# Patient Record
Sex: FEMALE | Race: BLACK OR AFRICAN AMERICAN | NOT HISPANIC OR LATINO | Employment: UNEMPLOYED | URBAN - METROPOLITAN AREA
[De-identification: names, ages, dates, MRNs, and addresses within clinical notes are randomized per-mention and may not be internally consistent; named-entity substitution may affect disease eponyms.]

---

## 2018-05-30 ENCOUNTER — HOSPITAL ENCOUNTER (INPATIENT)
Facility: HOSPITAL | Age: 43
LOS: 2 days | Discharge: HOME | DRG: 975 | End: 2018-06-01
Attending: EMERGENCY MEDICINE | Admitting: EMERGENCY MEDICINE
Payer: MEDICAID

## 2018-05-30 DIAGNOSIS — E44.0 MODERATE PROTEIN-CALORIE MALNUTRITION: Chronic | ICD-10-CM

## 2018-05-30 DIAGNOSIS — J18.9 PNEUMONIA OF RIGHT MIDDLE LOBE DUE TO INFECTIOUS ORGANISM: ICD-10-CM

## 2018-05-30 DIAGNOSIS — R41.82 ALTERED MENTAL STATUS: ICD-10-CM

## 2018-05-30 DIAGNOSIS — E87.1 HYPONATREMIA: ICD-10-CM

## 2018-05-30 DIAGNOSIS — J18.9 PNEUMONIA OF BOTH LOWER LOBES DUE TO INFECTIOUS ORGANISM: ICD-10-CM

## 2018-05-30 DIAGNOSIS — B20 AIDS: Primary | ICD-10-CM

## 2018-05-30 DIAGNOSIS — Z86.11 HISTORY OF TUBERCULOSIS: ICD-10-CM

## 2018-05-30 DIAGNOSIS — D72.819 CHRONIC LEUKOPENIA: ICD-10-CM

## 2018-05-30 DIAGNOSIS — Z72.0 TOBACCO ABUSE: Chronic | ICD-10-CM

## 2018-05-30 DIAGNOSIS — E87.6 HYPOKALEMIA: ICD-10-CM

## 2018-05-30 DIAGNOSIS — D63.8 ANEMIA OF CHRONIC DISORDER: Chronic | ICD-10-CM

## 2018-05-30 DIAGNOSIS — R04.2 HEMOPTYSIS: ICD-10-CM

## 2018-05-30 DIAGNOSIS — A31.0 MYCOBACTERIUM KANSASII INFECTION: ICD-10-CM

## 2018-05-30 LAB
ALBUMIN SERPL BCP-MCNC: 3 G/DL
ALP SERPL-CCNC: 70 U/L
ALT SERPL W/O P-5'-P-CCNC: 37 U/L
ANION GAP SERPL CALC-SCNC: 9 MMOL/L
APAP SERPL-MCNC: <3 UG/ML
AST SERPL-CCNC: 57 U/L
BASOPHILS # BLD AUTO: 0.01 K/UL
BASOPHILS NFR BLD: 0.3 %
BILIRUB SERPL-MCNC: 0.3 MG/DL
BNP SERPL-MCNC: <10 PG/ML
BUN SERPL-MCNC: 13 MG/DL
CALCIUM SERPL-MCNC: 8.9 MG/DL
CHLORIDE SERPL-SCNC: 99 MMOL/L
CK SERPL-CCNC: 102 U/L
CO2 SERPL-SCNC: 23 MMOL/L
CREAT SERPL-MCNC: 0.7 MG/DL
DIFFERENTIAL METHOD: ABNORMAL
EOSINOPHIL # BLD AUTO: 0 K/UL
EOSINOPHIL NFR BLD: 0.3 %
ERYTHROCYTE [DISTWIDTH] IN BLOOD BY AUTOMATED COUNT: 13.3 %
EST. GFR  (AFRICAN AMERICAN): >60 ML/MIN/1.73 M^2
EST. GFR  (NON AFRICAN AMERICAN): >60 ML/MIN/1.73 M^2
ETHANOL SERPL-MCNC: <10 MG/DL
GLUCOSE SERPL-MCNC: 113 MG/DL
HCT VFR BLD AUTO: 28.4 %
HGB BLD-MCNC: 9.6 G/DL
LIPASE SERPL-CCNC: 54 U/L
LYMPHOCYTES # BLD AUTO: 0.8 K/UL
LYMPHOCYTES NFR BLD: 21.6 %
MCH RBC QN AUTO: 31.4 PG
MCHC RBC AUTO-ENTMCNC: 33.8 G/DL
MCV RBC AUTO: 93 FL
MONOCYTES # BLD AUTO: 0.8 K/UL
MONOCYTES NFR BLD: 23.9 %
NEUTROPHILS # BLD AUTO: 1.9 K/UL
NEUTROPHILS NFR BLD: 53.9 %
PLATELET # BLD AUTO: 214 K/UL
PMV BLD AUTO: 10.1 FL
POTASSIUM SERPL-SCNC: 3.3 MMOL/L
PROT SERPL-MCNC: 10.7 G/DL
RBC # BLD AUTO: 3.06 M/UL
SALICYLATES SERPL-MCNC: <5 MG/DL
SODIUM SERPL-SCNC: 131 MMOL/L
TROPONIN I SERPL DL<=0.01 NG/ML-MCNC: <0.006 NG/ML
WBC # BLD AUTO: 3.47 K/UL

## 2018-05-30 PROCEDURE — 84443 ASSAY THYROID STIM HORMONE: CPT

## 2018-05-30 PROCEDURE — 83880 ASSAY OF NATRIURETIC PEPTIDE: CPT

## 2018-05-30 PROCEDURE — 93005 ELECTROCARDIOGRAM TRACING: CPT

## 2018-05-30 PROCEDURE — 12000002 HC ACUTE/MED SURGE SEMI-PRIVATE ROOM

## 2018-05-30 PROCEDURE — 85025 COMPLETE CBC W/AUTO DIFF WBC: CPT

## 2018-05-30 PROCEDURE — 93010 ELECTROCARDIOGRAM REPORT: CPT | Mod: ,,, | Performed by: INTERNAL MEDICINE

## 2018-05-30 PROCEDURE — 84484 ASSAY OF TROPONIN QUANT: CPT

## 2018-05-30 PROCEDURE — 83690 ASSAY OF LIPASE: CPT

## 2018-05-30 PROCEDURE — 80307 DRUG TEST PRSMV CHEM ANLYZR: CPT

## 2018-05-30 PROCEDURE — 80053 COMPREHEN METABOLIC PANEL: CPT

## 2018-05-30 PROCEDURE — 82550 ASSAY OF CK (CPK): CPT

## 2018-05-30 PROCEDURE — 80329 ANALGESICS NON-OPIOID 1 OR 2: CPT

## 2018-05-30 PROCEDURE — 96365 THER/PROPH/DIAG IV INF INIT: CPT

## 2018-05-30 PROCEDURE — 81025 URINE PREGNANCY TEST: CPT | Performed by: EMERGENCY MEDICINE

## 2018-05-30 PROCEDURE — 99285 EMERGENCY DEPT VISIT HI MDM: CPT | Mod: 25

## 2018-05-30 PROCEDURE — 80320 DRUG SCREEN QUANTALCOHOLS: CPT

## 2018-05-30 PROCEDURE — 96366 THER/PROPH/DIAG IV INF ADDON: CPT

## 2018-05-30 PROCEDURE — 96375 TX/PRO/DX INJ NEW DRUG ADDON: CPT

## 2018-05-30 PROCEDURE — 96360 HYDRATION IV INFUSION INIT: CPT

## 2018-05-31 PROBLEM — F14.10 COCAINE ABUSE: Status: ACTIVE | Noted: 2018-05-31

## 2018-05-31 PROBLEM — D72.819 CHRONIC LEUKOPENIA: Status: ACTIVE | Noted: 2018-05-31

## 2018-05-31 LAB
AMPHET+METHAMPHET UR QL: NEGATIVE
B-HCG UR QL: NEGATIVE
BARBITURATES UR QL SCN>200 NG/ML: NEGATIVE
BENZODIAZ UR QL SCN>200 NG/ML: NORMAL
BILIRUB UR QL STRIP: NEGATIVE
BZE UR QL SCN: NORMAL
CANNABINOIDS UR QL SCN: NEGATIVE
CD3+CD4+ CELLS # BLD: 97 CELLS/UL (ref 300–1400)
CD3+CD4+ CELLS NFR BLD: 13.7 % (ref 28–57)
CLARITY UR: CLEAR
COLOR UR: YELLOW
CREAT UR-MCNC: 96.6 MG/DL
CTP QC/QA: YES
GLUCOSE UR QL STRIP: NEGATIVE
HGB UR QL STRIP: ABNORMAL
KETONES UR QL STRIP: NEGATIVE
LEUKOCYTE ESTERASE UR QL STRIP: NEGATIVE
METHADONE UR QL SCN>300 NG/ML: NEGATIVE
MICROSCOPIC COMMENT: ABNORMAL
NITRITE UR QL STRIP: NEGATIVE
OPIATES UR QL SCN: NEGATIVE
PCP UR QL SCN>25 NG/ML: NEGATIVE
PH UR STRIP: 6 [PH] (ref 5–8)
PROT UR QL STRIP: NEGATIVE
RBC #/AREA URNS HPF: 10 /HPF (ref 0–4)
SP GR UR STRIP: 1.02 (ref 1–1.03)
TOXICOLOGY INFORMATION: NORMAL
TSH SERPL DL<=0.005 MIU/L-ACNC: 0.61 UIU/ML
URN SPEC COLLECT METH UR: ABNORMAL
UROBILINOGEN UR STRIP-ACNC: NEGATIVE EU/DL
WBC #/AREA URNS HPF: 1 /HPF (ref 0–5)

## 2018-05-31 PROCEDURE — 25000003 PHARM REV CODE 250: Performed by: HOSPITALIST

## 2018-05-31 PROCEDURE — 25000003 PHARM REV CODE 250: Performed by: EMERGENCY MEDICINE

## 2018-05-31 PROCEDURE — 81000 URINALYSIS NONAUTO W/SCOPE: CPT | Mod: 59

## 2018-05-31 PROCEDURE — 63600175 PHARM REV CODE 636 W HCPCS: Performed by: EMERGENCY MEDICINE

## 2018-05-31 PROCEDURE — 21400001 HC TELEMETRY ROOM

## 2018-05-31 PROCEDURE — A4216 STERILE WATER/SALINE, 10 ML: HCPCS | Performed by: EMERGENCY MEDICINE

## 2018-05-31 PROCEDURE — 87536 HIV-1 QUANT&REVRSE TRNSCRPJ: CPT

## 2018-05-31 PROCEDURE — 80307 DRUG TEST PRSMV CHEM ANLYZR: CPT

## 2018-05-31 PROCEDURE — 99203 OFFICE O/P NEW LOW 30 MIN: CPT | Mod: ,,, | Performed by: INTERNAL MEDICINE

## 2018-05-31 PROCEDURE — 86361 T CELL ABSOLUTE COUNT: CPT

## 2018-05-31 PROCEDURE — 87040 BLOOD CULTURE FOR BACTERIA: CPT

## 2018-05-31 RX ORDER — LORAZEPAM 2 MG/ML
1 INJECTION INTRAMUSCULAR
Status: COMPLETED | OUTPATIENT
Start: 2018-05-31 | End: 2018-05-31

## 2018-05-31 RX ORDER — FAMOTIDINE 10 MG/ML
20 INJECTION INTRAVENOUS EVERY 12 HOURS
Status: DISCONTINUED | OUTPATIENT
Start: 2018-05-31 | End: 2018-05-31

## 2018-05-31 RX ORDER — SODIUM CHLORIDE 0.9 % (FLUSH) 0.9 %
3 SYRINGE (ML) INJECTION EVERY 8 HOURS
Status: DISCONTINUED | OUTPATIENT
Start: 2018-05-31 | End: 2018-06-01 | Stop reason: HOSPADM

## 2018-05-31 RX ORDER — CEFTRIAXONE 2 G/50ML
2 INJECTION, SOLUTION INTRAVENOUS
Status: DISCONTINUED | OUTPATIENT
Start: 2018-05-31 | End: 2018-06-01 | Stop reason: HOSPADM

## 2018-05-31 RX ORDER — IBUPROFEN 200 MG
1 TABLET ORAL DAILY
Status: DISCONTINUED | OUTPATIENT
Start: 2018-06-01 | End: 2018-06-01 | Stop reason: HOSPADM

## 2018-05-31 RX ORDER — RAMELTEON 8 MG/1
8 TABLET ORAL NIGHTLY PRN
Status: DISCONTINUED | OUTPATIENT
Start: 2018-05-31 | End: 2018-06-01 | Stop reason: HOSPADM

## 2018-05-31 RX ORDER — AMOXICILLIN 250 MG
1 CAPSULE ORAL 2 TIMES DAILY
Status: DISCONTINUED | OUTPATIENT
Start: 2018-05-31 | End: 2018-06-01 | Stop reason: HOSPADM

## 2018-05-31 RX ORDER — ACETAMINOPHEN 325 MG/1
650 TABLET ORAL EVERY 8 HOURS PRN
Status: DISCONTINUED | OUTPATIENT
Start: 2018-05-31 | End: 2018-06-01 | Stop reason: HOSPADM

## 2018-05-31 RX ORDER — SULFAMETHOXAZOLE AND TRIMETHOPRIM 800; 160 MG/1; MG/1
1 TABLET ORAL DAILY
Status: DISCONTINUED | OUTPATIENT
Start: 2018-06-01 | End: 2018-06-01 | Stop reason: HOSPADM

## 2018-05-31 RX ORDER — CITALOPRAM 20 MG/1
20 TABLET, FILM COATED ORAL DAILY
Status: DISCONTINUED | OUTPATIENT
Start: 2018-06-01 | End: 2018-06-01 | Stop reason: HOSPADM

## 2018-05-31 RX ORDER — ONDANSETRON 2 MG/ML
4 INJECTION INTRAMUSCULAR; INTRAVENOUS EVERY 8 HOURS PRN
Status: DISCONTINUED | OUTPATIENT
Start: 2018-05-31 | End: 2018-06-01 | Stop reason: HOSPADM

## 2018-05-31 RX ORDER — ZOLPIDEM TARTRATE 5 MG/1
5 TABLET ORAL NIGHTLY PRN
Status: DISCONTINUED | OUTPATIENT
Start: 2018-05-31 | End: 2018-05-31

## 2018-05-31 RX ORDER — CYPROHEPTADINE HYDROCHLORIDE 4 MG/1
4 TABLET ORAL 2 TIMES DAILY
Status: DISCONTINUED | OUTPATIENT
Start: 2018-05-31 | End: 2018-06-01 | Stop reason: HOSPADM

## 2018-05-31 RX ORDER — ALPRAZOLAM 0.5 MG/1
1 TABLET ORAL 2 TIMES DAILY PRN
Status: DISCONTINUED | OUTPATIENT
Start: 2018-05-31 | End: 2018-06-01 | Stop reason: HOSPADM

## 2018-05-31 RX ORDER — SODIUM CHLORIDE 9 MG/ML
INJECTION, SOLUTION INTRAVENOUS ONCE
Status: COMPLETED | OUTPATIENT
Start: 2018-05-31 | End: 2018-05-31

## 2018-05-31 RX ADMIN — SODIUM CHLORIDE 500 ML/HR: 0.9 INJECTION, SOLUTION INTRAVENOUS at 06:05

## 2018-05-31 RX ADMIN — ALPRAZOLAM 1 MG: 0.5 TABLET ORAL at 06:05

## 2018-05-31 RX ADMIN — DOCUSATE SODIUM AND SENNOSIDES 1 TABLET: 8.6; 5 TABLET, FILM COATED ORAL at 09:05

## 2018-05-31 RX ADMIN — AZITHROMYCIN MONOHYDRATE 500 MG: 500 INJECTION, POWDER, LYOPHILIZED, FOR SOLUTION INTRAVENOUS at 03:05

## 2018-05-31 RX ADMIN — DEXTROSE AND SODIUM CHLORIDE 1000 ML: 5; .9 INJECTION, SOLUTION INTRAVENOUS at 12:05

## 2018-05-31 RX ADMIN — LORAZEPAM 1 MG: 2 INJECTION INTRAMUSCULAR; INTRAVENOUS at 03:05

## 2018-05-31 RX ADMIN — CEFTRIAXONE 2 G: 2 INJECTION, SOLUTION INTRAVENOUS at 07:05

## 2018-05-31 RX ADMIN — CYPROHEPTADINE HYDROCHLORIDE 4 MG: 4 TABLET ORAL at 09:05

## 2018-05-31 RX ADMIN — SODIUM CHLORIDE, PRESERVATIVE FREE 3 ML: 5 INJECTION INTRAVENOUS at 09:05

## 2018-05-31 RX ADMIN — ACETAMINOPHEN 650 MG: 325 TABLET, FILM COATED ORAL at 06:05

## 2018-05-31 NOTE — HPI
"Ms. Jayden Marcial is a 42 y.o. woman with HIV/AIDS not adherent to ART, KANDY and M [place] pulmonary infection not on treatment, and pancreatic mass who presents with fatigue. Patient states that she has felt poorly for "a while" and "very bad" for a few days. She is unable to describe this further. She says that after using cocaine yesterday she felt delusional and restless. She describes hemoptysis intermittent, abdominal swelling, and "itching when [she] wipe[s] for "a long time." She denies headaches, vision changes, decreased PO, dysphagia, odynophagia, nausea, vomiting, chest pain, shortness of breath, constipation, diarrhea, blood in stools, urinary changes, hematuria, vaginal discharge, rashes, and joint pains. She states that she does not take her medications because "they make [her] feel weird."     Last admitted in [facility] system in 12/2017 for Strep bacteremia. KANDY and WHIT curranii were diagnosed in 8/2017, was discharged with treatment, but patient was not adherent. Notes from Care Everywhere indicate that patient has been recently seen there for pancreatic mass with concern for malignancy; EGD/EUS performed on 5/17- only pathology for review is gastric antrum biopsy which was negative.   "

## 2018-05-31 NOTE — SUBJECTIVE & OBJECTIVE
"Past Medical History:   Diagnosis Date    AIDS     Blind right eye     Cocaine use     HIV dementia     Malnutrition     Non compliance with medical treatment     Pancreatitis     TB (pulmonary tuberculosis)     Tobacco abuse        Past Surgical History:   Procedure Laterality Date    ABSCESS DRAINAGE      APPENDECTOMY      CORNEAL TRANSPLANT         Review of patient's allergies indicates:  No Known Allergies    Family History     Problem Relation (Age of Onset)    COPD Mother    Cancer Maternal Grandmother    Emphysema Mother    Hypertension Mother        Social History Main Topics    Smoking status: Current Every Day Smoker     Packs/day: 0.50     Years: 20.00    Smokeless tobacco: Not on file    Alcohol use No      Comment: occasional, "not drinking like she used to" before the pancreatitis     Drug use: Yes     Frequency: 1.0 time per week     Types: Cocaine, Benzodiazepines, Marijuana      Comment: last used cocaine a few days ago    Sexual activity: Not on file         Review of Systems   Constitutional: Positive for activity change, appetite change, fatigue and unexpected weight change.   HENT: Negative for congestion and postnasal drip.    Respiratory: Positive for cough and shortness of breath.    Cardiovascular: Negative for chest pain and leg swelling.   Gastrointestinal: Positive for abdominal pain and nausea. Negative for abdominal distention.   Endocrine: Negative for cold intolerance and heat intolerance.   Musculoskeletal: Negative for arthralgias and back pain.   Allergic/Immunologic: Negative for environmental allergies and food allergies.   Neurological: Negative for dizziness.   Hematological: Negative for adenopathy.   Psychiatric/Behavioral: Negative for agitation and behavioral problems.     Objective:     Vital Signs (Most Recent):  Temp: 99.1 °F (37.3 °C) (05/31/18 1100)  Pulse: 103 (05/31/18 1100)  Resp: 16 (05/31/18 1100)  BP: 97/62 (05/31/18 1100)  SpO2: 99 % (05/31/18 " 1100) Vital Signs (24h Range):  Temp:  [98.3 °F (36.8 °C)-99.1 °F (37.3 °C)] 99.1 °F (37.3 °C)  Pulse:  [] 103  Resp:  [16-18] 16  SpO2:  [95 %-100 %] 99 %  BP: ()/(53-82) 97/62     Weight: 40.2 kg (88 lb 10 oz)  Body mass index is 17.31 kg/m².      Intake/Output Summary (Last 24 hours) at 05/31/18 1311  Last data filed at 05/31/18 0900   Gross per 24 hour   Intake             2760 ml   Output              650 ml   Net             2110 ml       Physical Exam   Constitutional: She is oriented to person, place, and time.   Cachectic, malnourished    HENT:   Head: Normocephalic and atraumatic.   Eyes: EOM are normal. Right eye exhibits no discharge. Left eye exhibits no discharge.   Neck: Normal range of motion. Neck supple.   Cardiovascular: Normal rate and regular rhythm.    Pulmonary/Chest: Effort normal. She has no wheezes. She has no rales.   Abdominal: Soft. Bowel sounds are normal.   Musculoskeletal: Normal range of motion. She exhibits no edema.   Neurological: She is alert and oriented to person, place, and time.   Skin: Skin is warm and dry.   Psychiatric: She has a normal mood and affect. Her behavior is normal.   Nursing note and vitals reviewed.      Vents:       Lines/Drains/Airways     Peripheral Intravenous Line                 Peripheral IV - Single Lumen 05/30/18 2302 Right Forearm less than 1 day                Significant Labs:    CBC/Anemia Profile:    Recent Labs  Lab 05/30/18  2303   WBC 3.47*   HGB 9.6*   HCT 28.4*      MCV 93   RDW 13.3        Chemistries:    Recent Labs  Lab 05/30/18  2303   *   K 3.3*   CL 99   CO2 23   BUN 13   CREATININE 0.7   CALCIUM 8.9   ALBUMIN 3.0*   PROT 10.7*   BILITOT 0.3   ALKPHOS 70   ALT 37   AST 57*       All pertinent labs within the past 24 hours have been reviewed.    Significant Imaging:   I have reviewed and interpreted all pertinent imaging results/findings within the past 24 hours.

## 2018-05-31 NOTE — PLAN OF CARE
Problem: Fall Risk (Adult)  Goal: Absence of Falls  Patient will demonstrate the desired outcomes by discharge/transition of care.   Outcome: Ongoing (interventions implemented as appropriate)   05/31/18 1641   Fall Risk (Adult)   Absence of Falls making progress toward outcome       Problem: Patient Care Overview  Goal: Plan of Care Review  Outcome: Ongoing (interventions implemented as appropriate)   05/31/18 1641   Coping/Psychosocial   Plan Of Care Reviewed With patient       Problem: Infection, Risk/Actual (Adult)  Goal: Infection Prevention/Resolution  Patient will demonstrate the desired outcomes by discharge/transition of care.   Outcome: Ongoing (interventions implemented as appropriate)   05/31/18 1641   Infection, Risk/Actual (Adult)   Infection Prevention/Resolution making progress toward outcome       Problem: Anxiety (Adult)  Goal: Identify Related Risk Factors and Signs and Symptoms  Related risk factors and signs and symptoms are identified upon initiation of Human Response Clinical Practice Guideline (CPG)   Outcome: Ongoing (interventions implemented as appropriate)   05/31/18 1641   Anxiety   Signs and Symptoms (Anxiety) nervousness/tension/restlessness;apprehension/being worried

## 2018-05-31 NOTE — HPI
42 year old female with AIDS(follows at [facility] per patient), cocaine abuse, KANDY and mycobacterium [place](unclear if she completed therapy), and severe protein malnutrition. Patient is a poor historian. She has decline in her status for months to years. Pulmonary consulted for respiratory issues. Patient reports clear to yellow sputum production. She is unable to eplain onset of her respiratory symptoms. She is currently on room air with O2 sat > 99%.

## 2018-05-31 NOTE — CONSULTS
"[facility]  Pulmonology  Consult Note    Patient Name: Jayden Marcial  MRN: [id number]  Admission Date: 5/30/2018  Hospital Length of Stay: 0 days  Code Status: Full Code  Attending Physician: Irina Lima MD  Primary Care Provider: Teddy Kohler MD   Principal Problem: <principal problem not specified>    Inpatient consult to Pulmonology  Consult performed by: MAK SHAFER  Consult ordered by: EMMA JACKSON        Subjective:     HPI:  42 year old female with AIDS(follows at [facility] per patient), cocaine abuse, KANDY and mycobacterium [place](unclear if she completed therapy), and severe protein malnutrition. Patient is a poor historian. She has decline in her status for months to years. Pulmonary consulted for respiratory issues. Patient reports clear to yellow sputum production. She is unable to eplain onset of her respiratory symptoms. She is currently on room air with O2 sat > 99%.     Past Medical History:   Diagnosis Date    AIDS     Blind right eye     Cocaine use     HIV dementia     Malnutrition     Non compliance with medical treatment     Pancreatitis     TB (pulmonary tuberculosis)     Tobacco abuse        Past Surgical History:   Procedure Laterality Date    ABSCESS DRAINAGE      APPENDECTOMY      CORNEAL TRANSPLANT         Review of patient's allergies indicates:  No Known Allergies    Family History     Problem Relation (Age of Onset)    COPD Mother    Cancer Maternal Grandmother    Emphysema Mother    Hypertension Mother        Social History Main Topics    Smoking status: Current Every Day Smoker     Packs/day: 0.50     Years: 20.00    Smokeless tobacco: Not on file    Alcohol use No      Comment: occasional, "not drinking like she used to" before the pancreatitis     Drug use: Yes     Frequency: 1.0 time per week     Types: Cocaine, Benzodiazepines, Marijuana      Comment: last used cocaine a few days ago    Sexual activity: Not on file     "     Review of Systems   Constitutional: Positive for activity change, appetite change, fatigue and unexpected weight change.   HENT: Negative for congestion and postnasal drip.    Respiratory: Positive for cough and shortness of breath.    Cardiovascular: Negative for chest pain and leg swelling.   Gastrointestinal: Positive for abdominal pain and nausea. Negative for abdominal distention.   Endocrine: Negative for cold intolerance and heat intolerance.   Musculoskeletal: Negative for arthralgias and back pain.   Allergic/Immunologic: Negative for environmental allergies and food allergies.   Neurological: Negative for dizziness.   Hematological: Negative for adenopathy.   Psychiatric/Behavioral: Negative for agitation and behavioral problems.     Objective:     Vital Signs (Most Recent):  Temp: 99.1 °F (37.3 °C) (05/31/18 1100)  Pulse: 103 (05/31/18 1100)  Resp: 16 (05/31/18 1100)  BP: 97/62 (05/31/18 1100)  SpO2: 99 % (05/31/18 1100) Vital Signs (24h Range):  Temp:  [98.3 °F (36.8 °C)-99.1 °F (37.3 °C)] 99.1 °F (37.3 °C)  Pulse:  [] 103  Resp:  [16-18] 16  SpO2:  [95 %-100 %] 99 %  BP: ()/(53-82) 97/62     Weight: 40.2 kg (88 lb 10 oz)  Body mass index is 17.31 kg/m².      Intake/Output Summary (Last 24 hours) at 05/31/18 1311  Last data filed at 05/31/18 0900   Gross per 24 hour   Intake             2760 ml   Output              650 ml   Net             2110 ml       Physical Exam   Constitutional: She is oriented to person, place, and time.   Cachectic, malnourished    HENT:   Head: Normocephalic and atraumatic.   Eyes: EOM are normal. Right eye exhibits no discharge. Left eye exhibits no discharge.   Neck: Normal range of motion. Neck supple.   Cardiovascular: Normal rate and regular rhythm.    Pulmonary/Chest: Effort normal. She has no wheezes. She has no rales.   Abdominal: Soft. Bowel sounds are normal.   Musculoskeletal: Normal range of motion. She exhibits no edema.   Neurological: She is alert  and oriented to person, place, and time.   Skin: Skin is warm and dry.   Psychiatric: She has a normal mood and affect. Her behavior is normal.   Nursing note and vitals reviewed.      Vents:       Lines/Drains/Airways     Peripheral Intravenous Line                 Peripheral IV - Single Lumen 05/30/18 2302 Right Forearm less than 1 day                Significant Labs:    CBC/Anemia Profile:    Recent Labs  Lab 05/30/18  2303   WBC 3.47*   HGB 9.6*   HCT 28.4*      MCV 93   RDW 13.3        Chemistries:    Recent Labs  Lab 05/30/18  2303   *   K 3.3*   CL 99   CO2 23   BUN 13   CREATININE 0.7   CALCIUM 8.9   ALBUMIN 3.0*   PROT 10.7*   BILITOT 0.3   ALKPHOS 70   ALT 37   AST 57*       All pertinent labs within the past 24 hours have been reviewed.    Significant Imaging:   I have reviewed and interpreted all pertinent imaging results/findings within the past 24 hours.    Assessment/Plan:     Pneumonia of right middle lobe due to infectious organism    Patient with bibasilar lower lobe opacities on CXR. I suspect given her overall status(on room air with good O2 sat) and history that these findings are likely chronic. Her CXR is actually improved from her presentation 2 years ago(2016). There are less airspace opacities. Known history of KANDY and M. [place]. Unsure if she received treatment.   -Not unreasonable to treat for CAP.   -Follow up cultures  -Not unreasonable to get CT chest without contrast. Not sure what to do with findings given chronicity of symptoms.     Note: Reviewed care everywhere. There is concern that patient had pancreatic mass/neoplasm.               Thank you for your consult. I will follow-up with patient. Please contact us if you have any additional questions.     Mercedes Blake MD  Pulmonology  [facility] [place]

## 2018-05-31 NOTE — PROGRESS NOTES
PT arrived on unit via stretcher , accompanied per nurse. Pt is lethargic but arouses with tactile stimulus. Pt is unable to give an accurate history due to lethargy. Pt with ivab Zithromax infusing to rt  Forearm.site is clear.pt voided 400ml of clear yellow urine with out sediment.pt vital signs takne and she was placed on airborne contact isolation due to history and hemoptysis. Pt was oriented to environment, informed of md orders and blue admission booklet is at bedside.0520 am spoke with Dr. Moser in reference to pt b/p 81/53 and she is asymptomatic order noted for 500 ml saline bolus and to be repeated x one if b/p remains low.

## 2018-05-31 NOTE — SUBJECTIVE & OBJECTIVE
"Past Medical History:   Diagnosis Date    AIDS     Blind right eye     Cocaine use     HIV dementia     Malnutrition     Non compliance with medical treatment     Pancreatitis     TB (pulmonary tuberculosis)     Tobacco abuse        Past Surgical History:   Procedure Laterality Date    ABSCESS DRAINAGE      APPENDECTOMY      CORNEAL TRANSPLANT         Review of patient's allergies indicates:  No Known Allergies    No current facility-administered medications on file prior to encounter.      Current Outpatient Prescriptions on File Prior to Encounter   Medication Sig    acyclovir (ZOVIRAX) 200 MG capsule Take 2 capsules (400 mg total) by mouth 3 (three) times daily.    hydrocortisone 1 % cream Apply topically 2 (two) times daily.    ibuprofen (ADVIL,MOTRIN) 600 MG tablet Take 1 tablet (600 mg total) by mouth every 6 (six) hours as needed for Pain.    megestrol (MEGACE) 40 MG Tab Take 1 tablet (40 mg total) by mouth once daily.    miconazole (MICOTIN) 2 % vaginal cream Place 1 applicator vaginally every evening.    nicotine (NICODERM CQ) 21 mg/24 hr Place 1 patch onto the skin once daily.    oxycodone-acetaminophen (PERCOCET) 2.5-325 mg per tablet Take 1 tablet by mouth every 8 (eight) hours as needed for Pain.     Family History     Problem Relation (Age of Onset)    COPD Mother    Cancer Maternal Grandmother    Emphysema Mother    Hypertension Mother        Social History Main Topics    Smoking status: Current Every Day Smoker     Packs/day: 0.50     Years: 20.00    Smokeless tobacco: Not on file    Alcohol use No      Comment: occasional, "not drinking like she used to" before the pancreatitis     Drug use: Yes     Frequency: 1.0 time per week     Types: Cocaine, Benzodiazepines, Marijuana      Comment: last used cocaine a few days ago    Sexual activity: Not on file     Review of Systems   Constitutional: Positive for activity change and fatigue. Negative for appetite change, chills, " diaphoresis and fever.   HENT: Negative for congestion, ear pain, mouth sores, sinus pain, sinus pressure, sore throat and trouble swallowing.    Eyes: Negative for visual disturbance.   Respiratory: Negative for cough, chest tightness and shortness of breath.    Cardiovascular: Negative for chest pain, palpitations and leg swelling.   Gastrointestinal: Positive for abdominal distention. Negative for abdominal pain, blood in stool, constipation, diarrhea, nausea and vomiting.   Genitourinary: Negative for difficulty urinating, dysuria, flank pain, frequency, hematuria, urgency, vaginal bleeding and vaginal discharge.   Musculoskeletal: Negative for arthralgias, gait problem, myalgias, neck pain and neck stiffness.   Skin: Positive for rash (chronic ). Negative for pallor and wound.   Neurological: Negative for dizziness, weakness, light-headedness, numbness and headaches.     Objective:     Vital Signs (Most Recent):  Temp: 98.4 °F (36.9 °C) (05/31/18 1500)  Pulse: 98 (05/31/18 1500)  Resp: 16 (05/31/18 1500)  BP: 102/65 (05/31/18 1500)  SpO2: 99 % (05/31/18 1500) Vital Signs (24h Range):  Temp:  [98.3 °F (36.8 °C)-99.1 °F (37.3 °C)] 98.4 °F (36.9 °C)  Pulse:  [] 98  Resp:  [16-18] 16  SpO2:  [95 %-100 %] 99 %  BP: ()/(53-82) 102/65     Weight: 40.2 kg (88 lb 10 oz)  Body mass index is 17.31 kg/m².    Physical Exam   Constitutional: She is oriented to person, place, and time. No distress.   Cachetic woman   HENT:   Head: Normocephalic and atraumatic.   Nose: Nose normal.   Mouth/Throat: No oropharyngeal exudate.   Hyperpigmentation on hard and soft palate   Eyes: Conjunctivae and EOM are normal. Pupils are equal, round, and reactive to light. No scleral icterus.   Neck: Normal range of motion. Neck supple. No JVD present. No tracheal deviation present. No thyromegaly present.   Cardiovascular: Normal rate, regular rhythm, normal heart sounds and intact distal pulses.  Exam reveals no gallop and no  friction rub.    No murmur heard.  Pulmonary/Chest: Effort normal and breath sounds normal. No respiratory distress. She has no wheezes. She has no rales.   Room air   Abdominal: Soft. Bowel sounds are normal. She exhibits distension. She exhibits no mass. There is no tenderness. There is no guarding.   Musculoskeletal: She exhibits no edema, tenderness or deformity.   Lymphadenopathy:     She has no cervical adenopathy.   Neurological: She is alert and oriented to person, place, and time. No cranial nerve deficit or sensory deficit.   Skin: Skin is warm and dry. She is not diaphoretic.   Hyperpigmented macules on arms, legs, trunk, and back         CRANIAL NERVES     CN III, IV, VI   Pupils are equal, round, and reactive to light.  Extraocular motions are normal.        Significant Labs: All pertinent labs within the past 24 hours have been reviewed.    Significant Imaging: I have reviewed and interpreted all pertinent imaging results/findings within the past 24 hours.

## 2018-05-31 NOTE — PLAN OF CARE
TN met with pt at bedside. TN explained role in Case Management/Discharge Planning. TN inquired about HELP AT HOME. Pt stated that pt will have help at home with father Kwadwo. TN reviewed HELP AT HOME and DISCHARGE PLANNING brochure of questions to think about while in the hospital. Pt voiced understanding. TN inquired about what pt feels she is RESPONSIBLE for to MANAGE HEALTH AT HOME. Pt stated going to MD appointments and picking up any/all prescriptions and taking as prescribed. Pt able to demonstrate understanding using teach back method.        05/31/18 1115   Discharge Assessment   Assessment Type Discharge Planning Assessment   Assessment information obtained from? Patient   Prior to hospitilization cognitive status: Alert/Oriented   Prior to hospitalization functional status: Independent   Current cognitive status: Alert/Oriented   Current Functional Status: Independent   Facility Arrived From: home   Lives With parent(s)   Able to Return to Prior Arrangements yes   Is patient able to care for self after discharge? Yes   Who are your caregiver(s) and their phone number(s)? abdoul Burkett- [phone]   Patient's perception of discharge disposition home or selfcare   Readmission Within The Last 30 Days no previous admission in last 30 days   Patient currently being followed by outpatient case management? No   Patient currently receives any other outside agency services? No   Equipment Currently Used at Home none   Do you have any problems affording any of your prescribed medications? TBD   Is the patient taking medications as prescribed? no  (pt has a history of non compliance)   Does the patient have transportation home? Yes   Transportation Available family or friend will provide   Does the patient receive services at the [facility]? No   Discharge Plan A Home with family   Discharge Plan B Home with family  (appointments)   Patient/Family In Agreement With Plan unable to assess   Does the patient  have transportation to healthcare appointments? Yes     [facility] [place] [address] - [place] - [address] AT [address]  [place] [address]  Phone: [phone] Fax: [phone]

## 2018-05-31 NOTE — ED PROVIDER NOTES
"Encounter Date: 5/30/2018    SCRIBE #1 NOTE: I, Homero Handley, am scribing for, and in the presence of,  Maurizio Leslie MD. I have scribed the following portions of the note - Other sections scribed: HPI, ROS, and PE.       History     Chief Complaint   Patient presents with    Fatigue     generalized weakness starting earlier today     Anxiety     started earlier today after smoking crack, states she has been off her xanax for last several days, has psych hx.      CC: Fatigue    HPI: 42 y.o. female with history of pancreatitis, AIDS, tuberculosis, HIV dementia, and corneal transplant presents to ED c/o acute-on-chronic fatigue x1 wk. She reports headache as well. Patient states her iron is low. She ran out of iron pills the day before yesterday. She endorses feeling "delusional." She did not elaborate on this. She reports back pain, neck pain, chills, and productive cough. She attest to intermittently coughing up blood. She has chronic blurriness in the right eye. She has antiretroviral medication but only takes "some of it." She did not complete her antibiotics for tuberculosis as prescribed.       The history is provided by the patient. No  was used.     Review of patient's allergies indicates:  No Known Allergies  Past Medical History:   Diagnosis Date    AIDS     Blind right eye     Cocaine use     HIV dementia     Malnutrition     Non compliance with medical treatment     Pancreatitis     TB (pulmonary tuberculosis)     Tobacco abuse      Past Surgical History:   Procedure Laterality Date    ABSCESS DRAINAGE      APPENDECTOMY      CORNEAL TRANSPLANT       Family History   Problem Relation Age of Onset    Hypertension Mother     COPD Mother     Emphysema Mother     Cancer Maternal Grandmother      Social History   Substance Use Topics    Smoking status: Current Every Day Smoker     Packs/day: 0.50     Years: 20.00    Smokeless tobacco: Not on file    Alcohol use No " "     Comment: occasional, "not drinking like she used to" before the pancreatitis      Review of Systems   Constitutional: Positive for chills and fatigue. Negative for fever.   HENT: Negative for rhinorrhea and sore throat.    Respiratory: Positive for cough (productive).         (+) hemoptyis   Cardiovascular: Negative for chest pain.   Gastrointestinal: Negative for abdominal pain, constipation, diarrhea, nausea and vomiting.   Genitourinary: Negative for dysuria.   Musculoskeletal: Positive for back pain and neck pain.   All other systems reviewed and are negative.      Physical Exam     Initial Vitals [05/30/18 2229]   BP Pulse Resp Temp SpO2   108/70 96 18 98.3 °F (36.8 °C) 100 %      MAP       82.67         Physical Exam    Nursing note and vitals reviewed.  Constitutional: She is not diaphoretic. She appears cachectic. No distress.   Thin   HENT:   Head: Normocephalic.   Right Ear: External ear normal.   Left Ear: External ear normal.   Mouth/Throat: Oropharynx is clear and moist.   Eyes: EOM are normal. Pupils are equal, round, and reactive to light. Right eye exhibits no discharge. Left eye exhibits no discharge.   Chronic right eye post-surgical changes   Neck: Normal range of motion. Neck supple.   Normal ROM   Cardiovascular: Normal rate, regular rhythm, normal heart sounds and intact distal pulses. Exam reveals no gallop and no friction rub.    No murmur heard.  see documented heart rate and blood pressure   Pulmonary/Chest: Breath sounds normal. No stridor. No respiratory distress. She has no wheezes. She has no rhonchi. She has no rales.   Abdominal: Soft. There is no tenderness. There is no rebound and no guarding.   Musculoskeletal: She exhibits no edema.   Neurological: She is alert and oriented to person, place, and time. She has normal strength.   No obvious focal deficit   Skin: Skin is warm. No pallor.   Spots on skin, possible kaposi sarcoma?   Psychiatric:   Slightly bizarre affect. May be " due to acute intoxication. Reportedly patient using crack earlier today.         ED Course   Procedures  Labs Reviewed   CBC W/ AUTO DIFFERENTIAL - Abnormal; Notable for the following:        Result Value    WBC 3.47 (*)     RBC 3.06 (*)     Hemoglobin 9.6 (*)     Hematocrit 28.4 (*)     MCH 31.4 (*)     Lymph # 0.8 (*)     Mono% 23.9 (*)     All other components within normal limits   COMPREHENSIVE METABOLIC PANEL - Abnormal; Notable for the following:     Sodium 131 (*)     Potassium 3.3 (*)     Glucose 113 (*)     Total Protein 10.7 (*)     Albumin 3.0 (*)     AST 57 (*)     All other components within normal limits   URINALYSIS - Abnormal; Notable for the following:     Occult Blood UA 1+ (*)     All other components within normal limits   ACETAMINOPHEN LEVEL - Abnormal; Notable for the following:     Acetaminophen (Tylenol), Serum <3.0 (*)     All other components within normal limits   SALICYLATE LEVEL - Abnormal; Notable for the following:     Salicylate Lvl <5.0 (*)     All other components within normal limits   URINALYSIS MICROSCOPIC - Abnormal; Notable for the following:     RBC, UA 10 (*)     All other components within normal limits   TSH   DRUG SCREEN PANEL, URINE EMERGENCY   ALCOHOL,MEDICAL (ETHANOL)   CK   TROPONIN I   B-TYPE NATRIURETIC PEPTIDE   LIPASE   HIV RNA, QUANTITATIVE, PCR   POCT URINE PREGNANCY             Medical Decision Making:   Initial Assessment:   42-year-old female with history of AIDS, tuberculosis, pancreatic cancer, substance abuse presenting with anxiety, fatigue, and hemoptysis for the last week.  Patient was diagnosed with TB in 2016.  She states she did not complete her course of antibiotics.  She states she is not currently compliant with all of her heart therapy but states she takes a few of them but not all.  She notes episodes of hemoptysis with blood.  On exam, the patient is extremely thin, cachectic, has macular lesions diffusely on her skin on her palate, possibly  Kaposi's sarcoma.  In addition she has a mass noted on palpation near her liver.  Upon getting the history of hemoptysis with a history of AIDS and diagnosis of TB not fully treated, she was immediately placed on respiratory and droplet precautions and placed in a negative pressure room.  She does admit to using cocaine earlier today.  Is unclear to me if her altered mental status is due to acute intoxication from cocaine or other substances.  Given her multiple medical complaints, multiple severe medical history, we will admit for further evaluation of her hemoptysis.            Scribe Attestation:   Scribe #1: I performed the above scribed service and the documentation accurately describes the services I performed. I attest to the accuracy of the note.    Attending Attestation:           Physician Attestation for Scribe:  Physician Attestation Statement for Scribe #1: I, Maurizio Leslie MD, reviewed documentation, as scribed by Homero Handley in my presence, and it is both accurate and complete.                    Clinical Impression:   The primary encounter diagnosis was AIDS. Diagnoses of Altered mental status, Hemoptysis, History of tuberculosis, and Pneumonia of both lower lobes due to infectious organism were also pertinent to this visit.    Disposition:   Disposition: Admitted  Condition: Fair                        Maurizio Leslie MD  06/01/18 0222

## 2018-05-31 NOTE — H&P
"[facility] Hospital Medicine  History & Physical    Patient Name: Jayden Marcial  MRN: [id number]  Admission Date: 5/30/2018  Attending Physician: Irina Lima MD   Primary Care Provider: Teddy Kohler MD         Patient information was obtained from patient, past medical records and ER records.     Subjective:     Principal Problem:Pneumonia of right middle lobe due to infectious organism    Chief Complaint:   Chief Complaint   Patient presents with    Fatigue     generalized weakness starting earlier today     Anxiety     started earlier today after smoking crack, states she has been off her xanax for last several days, has psych hx.         HPI: Ms. Jayden Marcial is a 42 y.o. woman with HIV/AIDS not adherent to ART, KANDY and M [place] pulmonary infection not on treatment, and pancreatic mass who presents with fatigue. Patient states that she has felt poorly for "a while" and "very bad" for a few days. She is unable to describe this further. She says that after using cocaine yesterday she felt delusional and restless. She describes hemoptysis intermittent, abdominal swelling, and "itching when [she] wipe[s] for "a long time." She denies headaches, vision changes, decreased PO, dysphagia, odynophagia, nausea, vomiting, chest pain, shortness of breath, constipation, diarrhea, blood in stools, urinary changes, hematuria, vaginal discharge, rashes, and joint pains. She states that she does not take her medications because "they make [her] feel weird."     Last admitted in [facility] in 12/2017 for Strep bacteremia. KANDY and M [place] were diagnosed in 8/2017, was discharged with treatment, but patient was not adherent. Notes from Care Everywhere indicate that patient has been recently seen there for pancreatic mass with concern for malignancy; EGD/EUS performed on 5/17- only pathology for review is gastric antrum biopsy which was negative.     Past Medical History:   Diagnosis Date    AIDS     " "Blind right eye     Cocaine use     HIV dementia     Malnutrition     Non compliance with medical treatment     Pancreatitis     TB (pulmonary tuberculosis)     Tobacco abuse        Past Surgical History:   Procedure Laterality Date    ABSCESS DRAINAGE      APPENDECTOMY      CORNEAL TRANSPLANT         Review of patient's allergies indicates:  No Known Allergies    No current facility-administered medications on file prior to encounter.      Current Outpatient Prescriptions on File Prior to Encounter   Medication Sig    acyclovir (ZOVIRAX) 200 MG capsule Take 2 capsules (400 mg total) by mouth 3 (three) times daily.    hydrocortisone 1 % cream Apply topically 2 (two) times daily.    ibuprofen (ADVIL,MOTRIN) 600 MG tablet Take 1 tablet (600 mg total) by mouth every 6 (six) hours as needed for Pain.    megestrol (MEGACE) 40 MG Tab Take 1 tablet (40 mg total) by mouth once daily.    miconazole (MICOTIN) 2 % vaginal cream Place 1 applicator vaginally every evening.    nicotine (NICODERM CQ) 21 mg/24 hr Place 1 patch onto the skin once daily.    oxycodone-acetaminophen (PERCOCET) 2.5-325 mg per tablet Take 1 tablet by mouth every 8 (eight) hours as needed for Pain.     Family History     Problem Relation (Age of Onset)    COPD Mother    Cancer Maternal Grandmother    Emphysema Mother    Hypertension Mother        Social History Main Topics    Smoking status: Current Every Day Smoker     Packs/day: 0.50     Years: 20.00    Smokeless tobacco: Not on file    Alcohol use No      Comment: occasional, "not drinking like she used to" before the pancreatitis     Drug use: Yes     Frequency: 1.0 time per week     Types: Cocaine, Benzodiazepines, Marijuana      Comment: last used cocaine a few days ago    Sexual activity: Not on file     Review of Systems   Constitutional: Positive for activity change and fatigue. Negative for appetite change, chills, diaphoresis and fever.   HENT: Negative for congestion, ear " pain, mouth sores, sinus pain, sinus pressure, sore throat and trouble swallowing.    Eyes: Negative for visual disturbance.   Respiratory: Negative for cough, chest tightness and shortness of breath.    Cardiovascular: Negative for chest pain, palpitations and leg swelling.   Gastrointestinal: Positive for abdominal distention. Negative for abdominal pain, blood in stool, constipation, diarrhea, nausea and vomiting.   Genitourinary: Negative for difficulty urinating, dysuria, flank pain, frequency, hematuria, urgency, vaginal bleeding and vaginal discharge.   Musculoskeletal: Negative for arthralgias, gait problem, myalgias, neck pain and neck stiffness.   Skin: Positive for rash (chronic ). Negative for pallor and wound.   Neurological: Negative for dizziness, weakness, light-headedness, numbness and headaches.     Objective:     Vital Signs (Most Recent):  Temp: 98.4 °F (36.9 °C) (05/31/18 1500)  Pulse: 98 (05/31/18 1500)  Resp: 16 (05/31/18 1500)  BP: 102/65 (05/31/18 1500)  SpO2: 99 % (05/31/18 1500) Vital Signs (24h Range):  Temp:  [98.3 °F (36.8 °C)-99.1 °F (37.3 °C)] 98.4 °F (36.9 °C)  Pulse:  [] 98  Resp:  [16-18] 16  SpO2:  [95 %-100 %] 99 %  BP: ()/(53-82) 102/65     Weight: 40.2 kg (88 lb 10 oz)  Body mass index is 17.31 kg/m².    Physical Exam   Constitutional: She is oriented to person, place, and time. No distress.   Cachetic woman   HENT:   Head: Normocephalic and atraumatic.   Nose: Nose normal.   Mouth/Throat: No oropharyngeal exudate.   Hyperpigmentation on hard and soft palate   Eyes: Conjunctivae and EOM are normal. Pupils are equal, round, and reactive to light. No scleral icterus.   Neck: Normal range of motion. Neck supple. No JVD present. No tracheal deviation present. No thyromegaly present.   Cardiovascular: Normal rate, regular rhythm, normal heart sounds and intact distal pulses.  Exam reveals no gallop and no friction rub.    No murmur heard.  Pulmonary/Chest: Effort normal  and breath sounds normal. No respiratory distress. She has no wheezes. She has no rales.   Room air   Abdominal: Soft. Bowel sounds are normal. She exhibits distension. She exhibits no mass. There is no tenderness. There is no guarding.   Musculoskeletal: She exhibits no edema, tenderness or deformity.   Lymphadenopathy:     She has no cervical adenopathy.   Neurological: She is alert and oriented to person, place, and time. No cranial nerve deficit or sensory deficit.   Skin: Skin is warm and dry. She is not diaphoretic.   Hyperpigmented macules on arms, legs, trunk, and back         CRANIAL NERVES     CN III, IV, VI   Pupils are equal, round, and reactive to light.  Extraocular motions are normal.        Significant Labs: All pertinent labs within the past 24 hours have been reviewed.    Significant Imaging: I have reviewed and interpreted all pertinent imaging results/findings within the past 24 hours.    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 These have now cleared  - counseled on cessation           Chronic leukopenia    - most likely due to AIDS          AIDS    - patient nonadherent to treatment  - does not know what meds she is taking  - no meds listed at [facility], was not started on ART at last discharge  - check CD4/HIV viral load -- CD4 97/13.7%, down from 196 in 7/2016. Viral load pending.   - needs PJP PPX- start bactrim DS QD          Moderate protein-calorie malnutrition    - continue cyproheptadine           Mycobacterium kansasii infection    - as diagnosed in 8/2017  - patient was prescribed treatment but did not take it  - RLL infection looks improved on CXR  - CT chest confirms RUL, RML, and RLL infiltrate  -  will not start medications now          Tobacco abuse    - smoking 1ppd  - counseled on cessation  - nicotine patch 21mg daily           Anemia of chronic disorder    - at baseline  - most likely due to AIDS          Hypokalemia    - replete and monitor           Hyponatremia    - Na 131 on admit   - Patient has chronic hyponatremia dating back many years  - may be due to Pulm infection  - monitor             VTE Risk Mitigation         Ordered     IP VTE LOW RISK PATIENT  Once      05/31/18 0448             Irina Lima MD  Department of Hospital Medicine   [facility]

## 2018-06-01 ENCOUNTER — NURSE TRIAGE (OUTPATIENT)
Dept: ADMINISTRATIVE | Facility: CLINIC | Age: 43
End: 2018-06-01

## 2018-06-01 VITALS
TEMPERATURE: 98 F | WEIGHT: 91.25 LBS | HEART RATE: 96 BPM | OXYGEN SATURATION: 99 % | HEIGHT: 60 IN | BODY MASS INDEX: 17.91 KG/M2 | DIASTOLIC BLOOD PRESSURE: 60 MMHG | RESPIRATION RATE: 16 BRPM | SYSTOLIC BLOOD PRESSURE: 98 MMHG

## 2018-06-01 LAB
ANION GAP SERPL CALC-SCNC: 3 MMOL/L
BASOPHILS # BLD AUTO: 0.01 K/UL
BASOPHILS NFR BLD: 0.5 %
BUN SERPL-MCNC: 9 MG/DL
CALCIUM SERPL-MCNC: 8.5 MG/DL
CHLORIDE SERPL-SCNC: 109 MMOL/L
CO2 SERPL-SCNC: 24 MMOL/L
CREAT SERPL-MCNC: 0.7 MG/DL
DIFFERENTIAL METHOD: ABNORMAL
EOSINOPHIL # BLD AUTO: 0 K/UL
EOSINOPHIL NFR BLD: 2 %
ERYTHROCYTE [DISTWIDTH] IN BLOOD BY AUTOMATED COUNT: 13.5 %
EST. GFR  (AFRICAN AMERICAN): >60 ML/MIN/1.73 M^2
EST. GFR  (NON AFRICAN AMERICAN): >60 ML/MIN/1.73 M^2
GLUCOSE SERPL-MCNC: 84 MG/DL
HCT VFR BLD AUTO: 26.6 %
HGB BLD-MCNC: 8.9 G/DL
HIV UQ DATE RECEIVED: ABNORMAL
HIV UQ DATE REPORTED: ABNORMAL
HIV1 RNA # SERPL NAA+PROBE: ABNORMAL COPIES/ML
HIV1 RNA SERPL NAA+PROBE-LOG#: 5.7 LOG (10) COPIES/ML
HIV1 RNA SERPL QL NAA+PROBE: DETECTED
LYMPHOCYTES # BLD AUTO: 0.7 K/UL
LYMPHOCYTES NFR BLD: 33.2 %
MCH RBC QN AUTO: 32.1 PG
MCHC RBC AUTO-ENTMCNC: 33.5 G/DL
MCV RBC AUTO: 96 FL
MONOCYTES # BLD AUTO: 0.5 K/UL
MONOCYTES NFR BLD: 22.4 %
NEUTROPHILS # BLD AUTO: 0.9 K/UL
NEUTROPHILS NFR BLD: 41.9 %
PLATELET # BLD AUTO: 175 K/UL
PLATELET BLD QL SMEAR: ABNORMAL
PMV BLD AUTO: 10.4 FL
POTASSIUM SERPL-SCNC: 3.7 MMOL/L
RBC # BLD AUTO: 2.77 M/UL
SODIUM SERPL-SCNC: 136 MMOL/L
WBC # BLD AUTO: 2.05 K/UL

## 2018-06-01 PROCEDURE — 63600175 PHARM REV CODE 636 W HCPCS: Performed by: EMERGENCY MEDICINE

## 2018-06-01 PROCEDURE — 99213 OFFICE O/P EST LOW 20 MIN: CPT | Mod: ,,, | Performed by: INTERNAL MEDICINE

## 2018-06-01 PROCEDURE — 25000003 PHARM REV CODE 250: Performed by: HOSPITALIST

## 2018-06-01 PROCEDURE — 36415 COLL VENOUS BLD VENIPUNCTURE: CPT

## 2018-06-01 PROCEDURE — 25000003 PHARM REV CODE 250: Performed by: EMERGENCY MEDICINE

## 2018-06-01 PROCEDURE — 99233 SBSQ HOSP IP/OBS HIGH 50: CPT | Mod: ,,, | Performed by: INTERNAL MEDICINE

## 2018-06-01 PROCEDURE — 80048 BASIC METABOLIC PNL TOTAL CA: CPT

## 2018-06-01 PROCEDURE — S4991 NICOTINE PATCH NONLEGEND: HCPCS | Performed by: HOSPITALIST

## 2018-06-01 PROCEDURE — 85025 COMPLETE CBC W/AUTO DIFF WBC: CPT

## 2018-06-01 RX ORDER — SULFAMETHOXAZOLE AND TRIMETHOPRIM 800; 160 MG/1; MG/1
1 TABLET ORAL DAILY
Qty: 30 TABLET | Refills: 0 | Status: ON HOLD | OUTPATIENT
Start: 2018-06-02 | End: 2019-01-27 | Stop reason: SDUPTHER

## 2018-06-01 RX ORDER — AZITHROMYCIN 500 MG/1
500 TABLET, FILM COATED ORAL DAILY
Qty: 3 TABLET | Refills: 0 | Status: ON HOLD | OUTPATIENT
Start: 2018-06-01 | End: 2019-01-27 | Stop reason: HOSPADM

## 2018-06-01 RX ORDER — CITALOPRAM 20 MG/1
20 TABLET, FILM COATED ORAL DAILY
Qty: 30 TABLET | Refills: 0 | Status: ON HOLD | OUTPATIENT
Start: 2018-06-02 | End: 2019-02-07 | Stop reason: HOSPADM

## 2018-06-01 RX ORDER — CEFUROXIME AXETIL 500 MG/1
500 TABLET ORAL EVERY 12 HOURS
Qty: 14 TABLET | Refills: 0 | Status: SHIPPED | OUTPATIENT
Start: 2018-06-01 | End: 2018-06-08

## 2018-06-01 RX ADMIN — ACETAMINOPHEN 650 MG: 325 TABLET, FILM COATED ORAL at 09:06

## 2018-06-01 RX ADMIN — AZITHROMYCIN MONOHYDRATE 500 MG: 500 INJECTION, POWDER, LYOPHILIZED, FOR SOLUTION INTRAVENOUS at 02:06

## 2018-06-01 RX ADMIN — DOCUSATE SODIUM AND SENNOSIDES 1 TABLET: 8.6; 5 TABLET, FILM COATED ORAL at 09:06

## 2018-06-01 RX ADMIN — CYPROHEPTADINE HYDROCHLORIDE 4 MG: 4 TABLET ORAL at 09:06

## 2018-06-01 RX ADMIN — CITALOPRAM HYDROBROMIDE 20 MG: 20 TABLET ORAL at 09:06

## 2018-06-01 RX ADMIN — CEFTRIAXONE 2 G: 2 INJECTION, SOLUTION INTRAVENOUS at 02:06

## 2018-06-01 RX ADMIN — NICOTINE 1 PATCH: 21 PATCH, EXTENDED RELEASE TRANSDERMAL at 09:06

## 2018-06-01 RX ADMIN — SULFAMETHOXAZOLE AND TRIMETHOPRIM 1 TABLET: 800; 160 TABLET ORAL at 09:06

## 2018-06-01 NOTE — PROGRESS NOTES
[facility]  Pulmonology  Progress Note    Patient Name: Jayden Marcial  MRN: [id number]  Admission Date: 5/30/2018  Hospital Length of Stay: 1 days  Code Status: Full Code  Attending Provider: Victorina Zacarias MD  Primary Care Provider: Teddy Kohler MD   Principal Problem: AIDS    Subjective:     Interval History: No acute issues.     Objective:     Vital Signs (Most Recent):  Temp: 97.8 °F (36.6 °C) (06/01/18 0806)  Pulse: (!) 113 (06/01/18 0806)  Resp: 16 (06/01/18 0806)  BP: 101/72 (06/01/18 0806)  SpO2: 98 % (06/01/18 0806) Vital Signs (24h Range):  Temp:  [97.8 °F (36.6 °C)-99.6 °F (37.6 °C)] 97.8 °F (36.6 °C)  Pulse:  [] 113  Resp:  [14-16] 16  SpO2:  [95 %-99 %] 98 %  BP: ()/(50-72) 101/72     Weight: 41.4 kg (91 lb 4.3 oz)  Body mass index is 17.83 kg/m².      Intake/Output Summary (Last 24 hours) at 06/01/18 1108  Last data filed at 06/01/18 0209   Gross per 24 hour   Intake             1620 ml   Output              300 ml   Net             1320 ml       Physical Exam   Constitutional: She is oriented to person, place, and time. No distress.   Cachectic and malnourished.    HENT:   Head: Normocephalic and atraumatic.   Eyes: EOM are normal. Pupils are equal, round, and reactive to light.   Neck: Normal range of motion. Neck supple.   Cardiovascular: Normal rate and regular rhythm.    Pulmonary/Chest: Effort normal. She has no wheezes. She has no rales.   Abdominal: Soft. Bowel sounds are normal. She exhibits distension.   Musculoskeletal: She exhibits no edema or deformity.   Neurological: She is alert and oriented to person, place, and time.   Skin: Skin is warm and dry. She is not diaphoretic.   Psychiatric: She has a normal mood and affect. Her behavior is normal.   Nursing note and vitals reviewed.      Vents:       Lines/Drains/Airways     Peripheral Intravenous Line                 Peripheral IV - Single Lumen 05/30/18 2300 Right Forearm 1 day                Significant  Labs:    CBC/Anemia Profile:    Recent Labs  Lab 05/30/18  2303 06/01/18  0444   WBC 3.47* 2.05*   HGB 9.6* 8.9*   HCT 28.4* 26.6*    175   MCV 93 96   RDW 13.3 13.5        Chemistries:    Recent Labs  Lab 05/30/18  2303 06/01/18  0444   * 136   K 3.3* 3.7   CL 99 109   CO2 23 24   BUN 13 9   CREATININE 0.7 0.7   CALCIUM 8.9 8.5*   ALBUMIN 3.0*  --    PROT 10.7*  --    BILITOT 0.3  --    ALKPHOS 70  --    ALT 37  --    AST 57*  --        All pertinent labs within the past 24 hours have been reviewed.    Significant Imaging:  I have reviewed and interpreted all pertinent imaging results/findings within the past 24 hours.    Assessment/Plan:     Pneumonia of right middle lobe due to infectious organism    Patient with bibasilar lower lobe opacities on CXR. I suspect given her overall status(on room air with good O2 sat) and history that these findings are likely chronic. Her CXR is actually improved from her presentation 2 years ago(2016). There are less airspace opacities. Known history of KANDY and M[place]. Unsure if she received treatment. CT chest is actually improved from 2 years ago. Cystic disease. LIP is in the differential given HIV/AIDS.   -Not unreasonable to treat for CAP.   -Follow up cultures      Note: Reviewed care everywhere. There is concern that patient had pancreatic mass/neoplasm.           Patient will need follow up with Pulmonary as outpatient. Recommend seeing ID for ART therapy.   Patient is non compliant.   Will sign off. Please call with questions.      Mercedes Blake MD  Pulmonology  [facility]

## 2018-06-01 NOTE — PLAN OF CARE
Problem: Fall Risk (Adult)  Goal: Absence of Falls  Patient will demonstrate the desired outcomes by discharge/transition of care.   Outcome: Ongoing (interventions implemented as appropriate)   06/01/18 1033   Fall Risk (Adult)   Absence of Falls making progress toward outcome       Problem: Patient Care Overview  Goal: Plan of Care Review  Outcome: Ongoing (interventions implemented as appropriate)   06/01/18 1033   Coping/Psychosocial   Plan Of Care Reviewed With patient       Problem: Infection, Risk/Actual (Adult)  Goal: Infection Prevention/Resolution  Patient will demonstrate the desired outcomes by discharge/transition of care.   Outcome: Ongoing (interventions implemented as appropriate)   06/01/18 1033   Infection, Risk/Actual (Adult)   Infection Prevention/Resolution making progress toward outcome       Problem: Anxiety (Adult)  Goal: Identify Related Risk Factors and Signs and Symptoms  Related risk factors and signs and symptoms are identified upon initiation of Human Response Clinical Practice Guideline (CPG)   Outcome: Ongoing (interventions implemented as appropriate)   06/01/18 1033   Anxiety   Related Risk Factors (Anxiety) pain   Signs and Symptoms (Anxiety) apprehension/being worried

## 2018-06-01 NOTE — PROGRESS NOTES
Pt care assumed, pt aaox4 with no c/o of pain or any discomfort. Pt with saline lock in place to rt forearm site clear. Pt with personal items within reach  And safety maintained with bed alarm. Pt informed of md orders for this shift.

## 2018-06-01 NOTE — NURSING
Provided patient with d/c instructions and patient verbalized understanding. Patient was instructed to call for ride home.

## 2018-06-01 NOTE — NURSING
"MD informed that patient is shaking and "going through withdrawals". No new orders noted. Will discharge patient as planned.   "

## 2018-06-01 NOTE — NURSING
Called patient's father Kwadwo and left a message indicating that patient has medications that needs to be picked up.

## 2018-06-01 NOTE — NURSING
In preparation for discharge, d/c'ed patient's saline lock, applied pressure to site, secured site with gauze and tape, no redness or swelling noted. Patient is sitting in room waiting for ride home and discharge instructions. NAD noted.

## 2018-06-01 NOTE — CONSULTS
"[facility]  Infectious Disease  Consult Note    Patient Name: Jayden Marcial  MRN: [id number]  Admission Date: 5/30/2018  Hospital Length of Stay: 1 days  Attending Physician: Victorina Zacarias MD  Primary Care Provider: Teddy Kohler MD     Isolation Status: No active isolations    Patient information was obtained from patient, past medical records and ER records.      Inpatient consult to Infectious Diseases  Consult performed by: ENRICO CORCORAN  Consult ordered by: EMMA JACKSON        Assessment/Plan:     * AIDS    - non-compliant with ART  - CD4 97  - previously on Triumeq ?  - needs to establish f/u prior to restarting ART  - long d/w patient regarding adherence and that she will likely die from AIDS if she does not become more adherent  - OK to d/c om Bactrim for PJP prophylaxis  - need f/u with an HIV provider        Mycobacterium kansasii infection    - nonadherent with meds and/or follow-up  - CT stable  - would not treat unless she worsens  - needs to f/u with Pulmonary and ID after discharge            Thank you for your consult. I will sign off. Please contact us if you have any additional questions.    Enrico Corcoran MD  Infectious Disease  [facility]    Subjective:     Principal Problem: AIDS    HPI: Ms. Jayden Marcial is a 42 y.o. woman with HIV/AIDS not adherent to ART, KANDY and M [place] pulmonary infection not on treatment, and pancreatic mass who presents with fatigue. Patient states that she has felt poorly for "a while" and "very bad" for a few days. She is unable to describe this further. She says that after using cocaine yesterday she felt delusional and restless. She describes hemoptysis intermittent, abdominal swelling, and "itching when [she] wipe[s] for "a long time." She denies headaches, vision changes, decreased PO, dysphagia, odynophagia, nausea, vomiting, chest pain, shortness of breath, constipation, diarrhea, blood in stools, urinary " "changes, hematuria, vaginal discharge, rashes, and joint pains. She states that she does not take her medications because "they make [her] feel weird."  Last admitted in [facility] in 12/2017 for Strep bacteremia. NTM (KANDY and M [place]) were diagnosed in 8/2017, was discharged with treatment, but patient was not adherent. Notes from Care Everywhere indicate that patient has been recently seen there for pancreatic mass with concern for malignancy. The patient tells me that she has been non-compliant with ART and NTM meds.  She has improved here and is ready for discharge. I am consulted for ID recommendations.    Past Medical History:   Diagnosis Date    AIDS     Blind right eye     Cocaine use     HIV dementia     Malnutrition     Non compliance with medical treatment     Pancreatitis     TB (pulmonary tuberculosis)     Tobacco abuse        Past Surgical History:   Procedure Laterality Date    ABSCESS DRAINAGE      APPENDECTOMY      CORNEAL TRANSPLANT         Review of patient's allergies indicates:  No Known Allergies    Medications:  Prescriptions Prior to Admission   Medication Sig    acyclovir (ZOVIRAX) 200 MG capsule Take 2 capsules (400 mg total) by mouth 3 (three) times daily.    hydrocortisone 1 % cream Apply topically 2 (two) times daily.    ibuprofen (ADVIL,MOTRIN) 600 MG tablet Take 1 tablet (600 mg total) by mouth every 6 (six) hours as needed for Pain.    megestrol (MEGACE) 40 MG Tab Take 1 tablet (40 mg total) by mouth once daily.    miconazole (MICOTIN) 2 % vaginal cream Place 1 applicator vaginally every evening.    nicotine (NICODERM CQ) 21 mg/24 hr Place 1 patch onto the skin once daily.    oxycodone-acetaminophen (PERCOCET) 2.5-325 mg per tablet Take 1 tablet by mouth every 8 (eight) hours as needed for Pain.     Antibiotics     Start     Stop Route Frequency Ordered    06/01/18 0900  sulfamethoxazole-trimethoprim 800-160mg per tablet 1 tablet      -- Oral Daily 05/31/18 " "1646    05/31/18 0230  cefTRIAXone 2 gram/50 mL IVPB 2 g      -- IV Every 24 hours (non-standard times) 05/31/18 0128    05/31/18 0230  azithromycin 500 mg in 0.9 % sodium chloride 250 mL IVPB (ready to mix system)      -- IV Every 24 hours (non-standard times) 05/31/18 0128        Antifungals     None        Antivirals     None           Immunization History   Administered Date(s) Administered    Influenza - Quadrivalent - PF 11/30/2016    Pneumococcal Polysaccharide - 23 Valent 04/29/2013       Family History     Problem Relation (Age of Onset)    COPD Mother    Cancer Maternal Grandmother    Emphysema Mother    Hypertension Mother        Social History     Social History    Marital status: Single     Spouse name: N/A    Number of children: N/A    Years of education: N/A     Social History Main Topics    Smoking status: Current Every Day Smoker     Packs/day: 0.50     Years: 20.00    Smokeless tobacco: None    Alcohol use No      Comment: occasional, "not drinking like she used to" before the pancreatitis     Drug use: Yes     Frequency: 1.0 time per week     Types: Cocaine, Benzodiazepines, Marijuana      Comment: last used cocaine a few days ago    Sexual activity: Not Asked     Other Topics Concern    None     Social History Narrative    None     Review of Systems   Constitutional: Positive for fatigue. Negative for fever.   Respiratory: Positive for shortness of breath.    Gastrointestinal: Positive for abdominal pain.   All other systems reviewed and are negative.    Objective:     Vital Signs (Most Recent):  Temp: 97.8 °F (36.6 °C) (06/01/18 0806)  Pulse: (!) 113 (06/01/18 0806)  Resp: 16 (06/01/18 0806)  BP: 101/72 (06/01/18 0806)  SpO2: 98 % (06/01/18 0806) Vital Signs (24h Range):  Temp:  [97.8 °F (36.6 °C)-99.6 °F (37.6 °C)] 97.8 °F (36.6 °C)  Pulse:  [] 113  Resp:  [14-16] 16  SpO2:  [95 %-99 %] 98 %  BP: ()/(50-72) 101/72     Weight: 41.4 kg (91 lb 4.3 oz)  Body mass index is " 17.83 kg/m².    Estimated Creatinine Clearance: 67 mL/min (based on SCr of 0.7 mg/dL).    Physical Exam   Constitutional: She is oriented to person, place, and time. No distress.   cachectic   HENT:   Head: Normocephalic and atraumatic.   Eyes: Pupils are equal, round, and reactive to light.   Neck: Normal range of motion. Neck supple.   Cardiovascular: Normal rate.    Abdominal: Soft. Bowel sounds are normal. She exhibits no distension.   Musculoskeletal: Normal range of motion. She exhibits no edema or deformity.   Neurological: She is alert and oriented to person, place, and time. No cranial nerve deficit.   Skin: Skin is dry. She is not diaphoretic. No erythema.   Psychiatric: She has a normal mood and affect. Her behavior is normal. Judgment and thought content normal.   Nursing note and vitals reviewed.      Significant Labs:   Blood Culture:   Recent Labs  Lab 05/31/18  0208 05/31/18  0214   LABBLOO No Growth to date  No Growth to date No Growth to date  No Growth to date     C4 Count: No results for input(s): C4 in the last 48 hours.  Wound Culture: No results for input(s): LABAERO in the last 4320 hours.    Significant Imaging: I have reviewed all pertinent imaging results/findings within the past 24 hours.

## 2018-06-01 NOTE — HPI
"Ms. Jayden Marcial is a 42 y.o. woman with HIV/AIDS not adherent to ART, KANDY and M [place] pulmonary infection not on treatment, and pancreatic mass who presents with fatigue. Patient states that she has felt poorly for "a while" and "very bad" for a few days. She is unable to describe this further. She says that after using cocaine yesterday she felt delusional and restless. She describes hemoptysis intermittent, abdominal swelling, and "itching when [she] wipe[s] for "a long time." She denies headaches, vision changes, decreased PO, dysphagia, odynophagia, nausea, vomiting, chest pain, shortness of breath, constipation, diarrhea, blood in stools, urinary changes, hematuria, vaginal discharge, rashes, and joint pains. She states that she does not take her medications because "they make [her] feel weird."  Last admitted in [facility] in 12/2017 for Strep bacteremia. NTM (KANDY and M [place]) were diagnosed in 8/2017, was discharged with treatment, but patient was not adherent. Notes from Care Everywhere indicate that patient has been recently seen there for pancreatic mass with concern for malignancy. The patient tells me that she has been non-compliant with ART and NTM meds.  She has improved here and is ready for discharge. I am consulted for ID recommendations.  "

## 2018-06-01 NOTE — PLAN OF CARE
"   06/01/18 1142   Final Note   Assessment Type Final Discharge Note   Discharge Disposition Home   Hospital Follow Up  Appt(s) scheduled? Yes   Discharge plans and expectations educations in teach back method with documentation complete? Yes   Right Care Referral Info   Post Acute Recommendation No Care   1203- TN verbalized concerns that pt is in bed trembling and unable to speak clearly and very clammy verbalized to MD.       1207-EDUCATION:  Pts father Kwadwo  provided with educational information on pneumonia.  Information reviewed and placed in :My Healthcare Packet" to be brought home for pt/family to use as resource after discharge.  Information included:  signs and symptoms to look for and call the doctor if experiencing, and symptoms that may indicate a medical emergency: CALL 911.      All questions answered.  Teach back method used.  Pt father Kwadwo verbalized understanding of all information by stating, I will try to go to my scheduled appointment and will take my new medications to the pharmacy and take as prescribed. "      1210- call placed to pts nurse Liliam to advise her that all CM needs have been addressed and that concerns have been presented to MD and was informed d/c to proceed as ordered        "

## 2018-06-01 NOTE — PROGRESS NOTES
TN taught S&S for HIV home care with pt with teach back:  1. Plan to take meds once she recieves them, 2. Go to the [facility]. TN placed education sheet in [employer].     Help at home will be from father,Kwadwo, assisting in pt's recovery.     TN reviewed things pt, is responsible for when discharged:  To Manage her Care At Home:  1. Getting her prescriptions filled.  2. Taking her medications as directed. DO NOT MISS ANY DOSES!  3. Going to her follow-up doctor appointments.      TN checked [employer] for cm/sw name, spectra link #, pt contact, and d/c disposition....Sally Gonzalez RN, BSN, STN [facility] 6/1/2018

## 2018-06-01 NOTE — SUBJECTIVE & OBJECTIVE
Past Medical History:   Diagnosis Date    AIDS     Blind right eye     Cocaine use     HIV dementia     Malnutrition     Non compliance with medical treatment     Pancreatitis     TB (pulmonary tuberculosis)     Tobacco abuse        Past Surgical History:   Procedure Laterality Date    ABSCESS DRAINAGE      APPENDECTOMY      CORNEAL TRANSPLANT         Review of patient's allergies indicates:  No Known Allergies    Medications:  Prescriptions Prior to Admission   Medication Sig    acyclovir (ZOVIRAX) 200 MG capsule Take 2 capsules (400 mg total) by mouth 3 (three) times daily.    hydrocortisone 1 % cream Apply topically 2 (two) times daily.    ibuprofen (ADVIL,MOTRIN) 600 MG tablet Take 1 tablet (600 mg total) by mouth every 6 (six) hours as needed for Pain.    megestrol (MEGACE) 40 MG Tab Take 1 tablet (40 mg total) by mouth once daily.    miconazole (MICOTIN) 2 % vaginal cream Place 1 applicator vaginally every evening.    nicotine (NICODERM CQ) 21 mg/24 hr Place 1 patch onto the skin once daily.    oxycodone-acetaminophen (PERCOCET) 2.5-325 mg per tablet Take 1 tablet by mouth every 8 (eight) hours as needed for Pain.     Antibiotics     Start     Stop Route Frequency Ordered    06/01/18 0900  sulfamethoxazole-trimethoprim 800-160mg per tablet 1 tablet      -- Oral Daily 05/31/18 1646    05/31/18 0230  cefTRIAXone 2 gram/50 mL IVPB 2 g      -- IV Every 24 hours (non-standard times) 05/31/18 0128    05/31/18 0230  azithromycin 500 mg in 0.9 % sodium chloride 250 mL IVPB (ready to mix system)      -- IV Every 24 hours (non-standard times) 05/31/18 0128        Antifungals     None        Antivirals     None           Immunization History   Administered Date(s) Administered    Influenza - Quadrivalent - PF 11/30/2016    Pneumococcal Polysaccharide - 23 Valent 04/29/2013       Family History     Problem Relation (Age of Onset)    COPD Mother    Cancer Maternal Grandmother    Emphysema Mother     "Hypertension Mother        Social History     Social History    Marital status: Single     Spouse name: N/A    Number of children: N/A    Years of education: N/A     Social History Main Topics    Smoking status: Current Every Day Smoker     Packs/day: 0.50     Years: 20.00    Smokeless tobacco: None    Alcohol use No      Comment: occasional, "not drinking like she used to" before the pancreatitis     Drug use: Yes     Frequency: 1.0 time per week     Types: Cocaine, Benzodiazepines, Marijuana      Comment: last used cocaine a few days ago    Sexual activity: Not Asked     Other Topics Concern    None     Social History Narrative    None     Review of Systems   Constitutional: Positive for fatigue. Negative for fever.   Respiratory: Positive for shortness of breath.    Gastrointestinal: Positive for abdominal pain.   All other systems reviewed and are negative.    Objective:     Vital Signs (Most Recent):  Temp: 97.8 °F (36.6 °C) (06/01/18 0806)  Pulse: (!) 113 (06/01/18 0806)  Resp: 16 (06/01/18 0806)  BP: 101/72 (06/01/18 0806)  SpO2: 98 % (06/01/18 0806) Vital Signs (24h Range):  Temp:  [97.8 °F (36.6 °C)-99.6 °F (37.6 °C)] 97.8 °F (36.6 °C)  Pulse:  [] 113  Resp:  [14-16] 16  SpO2:  [95 %-99 %] 98 %  BP: ()/(50-72) 101/72     Weight: 41.4 kg (91 lb 4.3 oz)  Body mass index is 17.83 kg/m².    Estimated Creatinine Clearance: 67 mL/min (based on SCr of 0.7 mg/dL).    Physical Exam   Constitutional: She is oriented to person, place, and time. No distress.   cachectic   HENT:   Head: Normocephalic and atraumatic.   Eyes: Pupils are equal, round, and reactive to light.   Neck: Normal range of motion. Neck supple.   Cardiovascular: Normal rate.    Abdominal: Soft. Bowel sounds are normal. She exhibits no distension.   Musculoskeletal: Normal range of motion. She exhibits no edema or deformity.   Neurological: She is alert and oriented to person, place, and time. No cranial nerve deficit.   Skin: " Skin is dry. She is not diaphoretic. No erythema.   Psychiatric: She has a normal mood and affect. Her behavior is normal. Judgment and thought content normal.   Nursing note and vitals reviewed.      Significant Labs:   Blood Culture:   Recent Labs  Lab 05/31/18  0208 05/31/18  0214   LABBLOO No Growth to date  No Growth to date No Growth to date  No Growth to date     C4 Count: No results for input(s): C4 in the last 48 hours.  Wound Culture: No results for input(s): LABAERO in the last 4320 hours.    Significant Imaging: I have reviewed all pertinent imaging results/findings within the past 24 hours.

## 2018-06-01 NOTE — PLAN OF CARE
Problem: Anxiety (Adult)  Intervention: Promote Anxiety Reduction/Resolution   05/31/18 1920 06/01/18 0628   Cognitive Interventions   Sensory Stimulation Regulation --  auditory stimulation minimized;lighting decreased;quiet environment promoted;tactile stimulation minimized   Coping/Psychosocial Interventions   Supportive Measures active listening utilized;positive reinforcement provided --    Environmental Support calm environment promoted --          Comments: Pt has had stable hours this shift and she has had decrease anxiety this shift and she has not required any anti anxiety meds this shift.

## 2018-06-01 NOTE — PROGRESS NOTES
WRITTEN HEALTHCARE DISCHARGE INFORMATION      Things that YOU are responsible for to Manage Your Care At Home:  1. Getting your prescriptions filled.  2. Taking you medications as directed. DO NOT MISS ANY DOSES!  3. Going to your follow-up doctor appointments. This is important because it allows the doctor to monitor your progress and to determine if any changes need to be made to your treatment plan.     If you are unable to make your follow up appointments, please call the number listed and reschedule this appointment.      After discharge, if you need assistance, you can call [facility] Line for 24/7 assistance at [phone]     If you are experience any signs or symptoms, Call your doctor or Call 911 and come to your nearest Emergency Room.     Thank you for choosing [facility] and allowing us to care for you.        You should receive a call from [facility] Discharge [facility] within 48-72 hours to help manage your care after discharge. Please try to make sure that you answer your phone for this important phone call.     Follow-up Information     Ben Salter MD On 6/4/2018.    Specialty:  Family Medicine  Why:  Appointment scheduled for Monday June 4th at 1:15pm  Contact information:  [address]  [address]  [facility] [address]  [phone]

## 2018-06-01 NOTE — SUBJECTIVE & OBJECTIVE
Interval History: No acute issues.     Objective:     Vital Signs (Most Recent):  Temp: 97.8 °F (36.6 °C) (06/01/18 0806)  Pulse: (!) 113 (06/01/18 0806)  Resp: 16 (06/01/18 0806)  BP: 101/72 (06/01/18 0806)  SpO2: 98 % (06/01/18 0806) Vital Signs (24h Range):  Temp:  [97.8 °F (36.6 °C)-99.6 °F (37.6 °C)] 97.8 °F (36.6 °C)  Pulse:  [] 113  Resp:  [14-16] 16  SpO2:  [95 %-99 %] 98 %  BP: ()/(50-72) 101/72     Weight: 41.4 kg (91 lb 4.3 oz)  Body mass index is 17.83 kg/m².      Intake/Output Summary (Last 24 hours) at 06/01/18 1108  Last data filed at 06/01/18 0209   Gross per 24 hour   Intake             1620 ml   Output              300 ml   Net             1320 ml       Physical Exam   Constitutional: She is oriented to person, place, and time. No distress.   Cachectic and malnourished.    HENT:   Head: Normocephalic and atraumatic.   Eyes: EOM are normal. Pupils are equal, round, and reactive to light.   Neck: Normal range of motion. Neck supple.   Cardiovascular: Normal rate and regular rhythm.    Pulmonary/Chest: Effort normal. She has no wheezes. She has no rales.   Abdominal: Soft. Bowel sounds are normal. She exhibits distension.   Musculoskeletal: She exhibits no edema or deformity.   Neurological: She is alert and oriented to person, place, and time.   Skin: Skin is warm and dry. She is not diaphoretic.   Psychiatric: She has a normal mood and affect. Her behavior is normal.   Nursing note and vitals reviewed.      Vents:       Lines/Drains/Airways     Peripheral Intravenous Line                 Peripheral IV - Single Lumen 05/30/18 2302 Right Forearm 1 day                Significant Labs:    CBC/Anemia Profile:    Recent Labs  Lab 05/30/18  2303 06/01/18  0444   WBC 3.47* 2.05*   HGB 9.6* 8.9*   HCT 28.4* 26.6*    175   MCV 93 96   RDW 13.3 13.5        Chemistries:    Recent Labs  Lab 05/30/18  2303 06/01/18  0444   * 136   K 3.3* 3.7   CL 99 109   CO2 23 24   BUN 13 9   CREATININE  0.7 0.7   CALCIUM 8.9 8.5*   ALBUMIN 3.0*  --    PROT 10.7*  --    BILITOT 0.3  --    ALKPHOS 70  --    ALT 37  --    AST 57*  --        All pertinent labs within the past 24 hours have been reviewed.    Significant Imaging:  I have reviewed and interpreted all pertinent imaging results/findings within the past 24 hours.

## 2018-06-02 ENCOUNTER — NURSE TRIAGE (OUTPATIENT)
Dept: ADMINISTRATIVE | Facility: CLINIC | Age: 43
End: 2018-06-02

## 2018-06-02 NOTE — TELEPHONE ENCOUNTER
Reason for Disposition   General information question, no triage required and triager able to answer question    Protocols used: ST INFORMATION ONLY CALL-A-AH    Patient's family called inquiring about her life expectancy and what should they do because the patient won't eat and she is in a lot of pain. Advised her dad to bring her to the ED and discuss those questions they have about the patient's life expectancy with her doctors. Mr. Peraza verbalized understanding.

## 2018-06-03 PROBLEM — R41.0 DELIRIUM: Status: ACTIVE | Noted: 2018-06-03

## 2018-06-03 PROBLEM — B20 DEMENTIA DUE TO HIV INFECTION WITH BEHAVIORAL DISTURBANCE: Status: ACTIVE | Noted: 2018-06-03

## 2018-06-03 PROBLEM — F02.818 DEMENTIA DUE TO HIV INFECTION WITH BEHAVIORAL DISTURBANCE: Status: ACTIVE | Noted: 2018-06-03

## 2018-06-03 NOTE — HOSPITAL COURSE
"Ms. Marcial was admitted with possible pneumonia of right middle lobe mostly likely secondary to her known Mycobacterium kansasii and MAC infection, but was also started on on ceftriaxone and azithromycin. She was seen by ID and Pulmonary who felt this was all secondary to her chronic infections and non-compliance, but it was not unreasonable to complete treatment with course of antibiotics on discharge along with Bactrim for prophylaxis. ID recommended outpatient follow to consider starting ART in this patient but not until seen in clinic. She was abusing cocaine prior to admission which caused her delusions but these resolved. Pt was malingering and faked "shaking" withdrawals to stay and rest. She was observed not shaking and doing well, and her vitals were stable for discharge. She was counseled multiple times that if she does not stop abusing drugs and be compliant with her medications, that she will ultimately die of AIDS due to non-compliance. Follow up arranged.  "

## 2018-06-03 NOTE — DISCHARGE SUMMARY
"[facility]  Discharge Summary      Patient Name: Jayden Marcial  MRN: [id number]  Admission Date: 5/30/2018  Hospital Length of Stay: 1 days  Discharge Date and Time: 6/1/2018 12:30 PM  Attending Physician: Victorina Zacarias MD  Discharging Provider: Victorina Zacarias MD  Primary Care Provider: Teddy Kohler MD      HPI:   Ms. Jayden Marcial is a 42 y.o. woman with HIV/AIDS not adherent to ART, KANDY and M [place] pulmonary infection not on treatment, and pancreatic mass who presents with fatigue. Patient states that she has felt poorly for "a while" and "very bad" for a few days. She is unable to describe this further. She says that after using cocaine yesterday she felt delusional and restless. She describes hemoptysis intermittent, abdominal swelling, and "itching when [she] wipe[s] for "a long time." She denies headaches, vision changes, decreased PO, dysphagia, odynophagia, nausea, vomiting, chest pain, shortness of breath, constipation, diarrhea, blood in stools, urinary changes, hematuria, vaginal discharge, rashes, and joint pains. She states that she does not take her medications because "they make [her] feel weird."     Last admitted in [facility] in 12/2017 for Strep bacteremia. KANDY and M [place] were diagnosed in 8/2017, was discharged with treatment, but patient was not adherent. Notes from Care Everywhere indicate that patient has been recently seen there for pancreatic mass with concern for malignancy; EGD/EUS performed on 5/17- only pathology for review is gastric antrum biopsy which was negative.     * No surgery found *      Hospital Course:   Ms. Marcial was admitted with possible pneumonia of right middle lobe mostly likely secondary to her known Mycobacterium kansasii and MAC infection, but was also started on ceftriaxone and azithromycin. She was seen by ID and Pulmonary who felt this was all secondary to her chronic infections and non-compliance, but felt it was not " "unreasonable to complete treatment with course of antibiotics on discharge along with Bactrim for prophylaxis. ID recommended outpatient follow up to consider starting ART in this patient but not until seen in clinic. She was abusing cocaine prior to admission which caused her delusions but these resolved. Pt was malingering and faked "shaking" withdrawals to stay and rest in the hospital. She was observed not shaking and doing well by staff when she as observed from a distance, and her vitals were stable for discharge. She was counseled multiple times that if she does not stop abusing drugs and be compliant with her medications, that she will ultimately die of AIDS due to non-compliance. Follow up arranged.     Consults:   Consults         Status Ordering Provider     Inpatient consult to Infectious Diseases  Once     Provider:  (Not yet assigned)    EMMA Archuleta     Inpatient consult to Pulmonology  Once     Provider:  Mercedes Blake MD    Completed EMMA JACKSON        Service: Hospital Medicine    Final Active Diagnoses:    Diagnosis Date Noted POA    PRINCIPAL PROBLEM:  AIDS [B20] 11/22/2016 Yes     Chronic    Chronic leukopenia [D72.819] 05/31/2018 Yes    Cocaine abuse [F14.10] 05/31/2018 Yes    Moderate protein-calorie malnutrition [E44.0] 11/22/2016 Yes     Chronic    Pneumonia of right middle lobe due to infectious organism [J18.1] 11/22/2016 Yes    Mycobacterium kansasii infection [A31.0] 08/01/2016 Yes    Tobacco abuse [Z72.0] 10/22/2013 Yes     Chronic    Anemia of chronic disorder [D63.8] 04/25/2013 Yes     Chronic    Hyponatremia [E87.1] 04/24/2013 Yes    Hypokalemia [E87.6] 04/24/2013 Yes      Problems Resolved During this Admission:    Diagnosis Date Noted Date Resolved POA       Discharged Condition: fair    Disposition: Home or Self Care    Follow Up:  Follow-up Information     Ben Salter MD On 6/4/2018.    Specialty:  Family Medicine  Why:  " Appointment scheduled for Monday June 4th at 1:15pm  Contact information:  [address]  [address]  [facility] [address]  [phone]                 Patient Instructions:     Diet Adult Regular     Activity as tolerated         Significant Diagnostic Studies:     Pending Diagnostic Studies:     None         Medications:  Reconciled Home Medications:      Medication List      START taking these medications    azithromycin 500 MG tablet  Commonly known as:  ZITHROMAX  Take 1 tablet (500 mg total) by mouth once daily.     cefUROXime 500 MG tablet  Commonly known as:  CEFTIN  Take 1 tablet (500 mg total) by mouth every 12 (twelve) hours.     citalopram 20 MG tablet  Commonly known as:  CELEXA  Take 1 tablet (20 mg total) by mouth once daily.     sulfamethoxazole-trimethoprim 800-160mg 800-160 mg Tab  Commonly known as:  BACTRIM DS  Take 1 tablet by mouth once daily.        CONTINUE taking these medications    acyclovir 200 MG capsule  Commonly known as:  ZOVIRAX  Take 2 capsules (400 mg total) by mouth 3 (three) times daily.     hydrocortisone 1 % cream  Apply topically 2 (two) times daily.     ibuprofen 600 MG tablet  Commonly known as:  ADVIL,MOTRIN  Take 1 tablet (600 mg total) by mouth every 6 (six) hours as needed for Pain.     megestrol 40 MG Tab  Commonly known as:  MEGACE  Take 1 tablet (40 mg total) by mouth once daily.     miconazole 2 % vaginal cream  Commonly known as:  MICOTIN  Place 1 applicator vaginally every evening.     nicotine 21 mg/24 hr  Commonly known as:  NICODERM CQ  Place 1 patch onto the skin once daily.     oxyCODONE-acetaminophen 2.5-325 mg per tablet  Commonly known as:  PERCOCET  Take 1 tablet by mouth every 8 (eight) hours as needed for Pain.            Indwelling Lines/Drains at time of discharge:   Lines/Drains/Airways          No matching active lines, drains, or airways          Time spent on the discharge of patient: 35 minutes  Patient was seen and examined on  the date of discharge and determined to be suitable for discharge.         Victorina Zacarias MD  Department of Hospital Medicine  [facility]

## 2018-06-03 NOTE — TELEPHONE ENCOUNTER
"  Reason for Disposition   Patient or caregiver needs URGENT support or evaluation    Answer Assessment - Initial Assessment Questions  1. REASON for CALL: "Tell me what is happening? How can I best help you"  "What has changed in the last 24 hours?"      "I want to know what is going on with my daughter. Why did [facility] send her home in a cab?"  2. CALLER's RELATIONSHIP: "How are you related to patient?" (e.g., wife, , daughter, son, caregiver)      father  3. SYMPTOM - AVPU: "Does the patient seem to be awake or aware of what is going on?" (AVPU: awake, responding to verbal stimuli, responding to painful stimuli, unresonsive)      Patient is unable to communicate  4. SYMPTOM - PAIN: "Does the patient seem to be in any pain?" (e.g., Scale of 0 to 10; none-mild-moderate-severe)      Severe pain   5. SYMPTOM - BREATHING DIFFICULTY: "Is the patient having any difficulty breathing"?        6. CALLER's COPING: "How are you doing?"      "I want to know what's going on."  7. OTHER SYMPTOMS: "Does the patient have any other symptoms?" (e.g., agitation, fever)      Not eating    Protocols used: ST HOSPICE - DEATH IMMINENT OR DYING SYMPTOMS-A-AH    Patient's dad called again chantel to inquire why his daughter is not in hospice. Right now she is not communicating with the family and she is in pain and not eating. Emotional support given to the dad. He was encouraged to call for an ambulance for Ms. Marcial and go to the hospital to advocate on her behalf if she will allow him to.Mr. Marcial informed that the md would have to have a discussion with them about ordering hospice and placement and this could not be done over the phone at this time. Mr. Peraza verbalized understanding.   "

## 2018-06-05 LAB
BACTERIA BLD CULT: NORMAL
BACTERIA BLD CULT: NORMAL

## 2018-07-22 ENCOUNTER — HOSPITAL ENCOUNTER (EMERGENCY)
Facility: HOSPITAL | Age: 43
Discharge: NURSING, REHAB OR LONG-TERM CARE | End: 2018-07-22
Attending: EMERGENCY MEDICINE
Payer: MEDICAID

## 2018-07-22 VITALS
TEMPERATURE: 98 F | BODY MASS INDEX: 18.58 KG/M2 | DIASTOLIC BLOOD PRESSURE: 69 MMHG | HEART RATE: 99 BPM | HEIGHT: 62 IN | RESPIRATION RATE: 18 BRPM | WEIGHT: 101 LBS | OXYGEN SATURATION: 98 % | SYSTOLIC BLOOD PRESSURE: 119 MMHG

## 2018-07-22 DIAGNOSIS — T50.901A ACCIDENTAL DRUG OVERDOSE, INITIAL ENCOUNTER: ICD-10-CM

## 2018-07-22 DIAGNOSIS — F11.20 METHADONE MAINTENANCE THERAPY PATIENT: Primary | ICD-10-CM

## 2018-07-22 PROCEDURE — 99285 EMERGENCY DEPT VISIT HI MDM: CPT

## 2018-07-22 RX ORDER — METHADONE HYDROCHLORIDE 10 MG/ML
10 CONCENTRATE ORAL 2 TIMES DAILY
Status: ON HOLD | COMMUNITY
End: 2019-01-27 | Stop reason: HOSPADM

## 2018-07-22 NOTE — ED PROVIDER NOTES
"Encounter Date: 7/22/2018    SCRIBE #1 NOTE: I, Angelica Corrales, am scribing for, and in the presence of,  Ricky Cavanaugh MD. I have scribed the following portions of the note - Other sections scribed: ROS and HPI.       History     Chief Complaint   Patient presents with    Drug Overdose     pt at [facility].  Facility reports pt "may have gotten too much Methadone this am".  after questioning she states pt usually takes 1 ml but she "might have gotten 5 ml.   EMS found pt ambulatory in room without complaints.  Enroute pt refused all care.     CC: Drug Overdose.    HPI: This 42 y.o. female with  a past medical history of AIDS; Blind right eye; Cocaine use; HIV dementia; Malnutrition; Non compliance with medical treatment; Pancreatitis; TB ; and Tobacco abuse, presents to the ED from [facility] c/o an accidental drug overdose on a methadone today. Its is noted the patient may have received 5 ml instead of her regular 1ml. Call to EMS was placed because the patient was found lethargic by the nursing staff. Upon EMS arrival, the patient was ambulatory in her room. No prior medical intervention. Patient currently denies any complaints during exam and wants to go back home to eat her Popeyes.       The history is provided by the patient. No  was used.     Review of patient's allergies indicates:  No Known Allergies  Past Medical History:   Diagnosis Date    AIDS     Blind right eye     Cocaine use     HIV dementia     Malnutrition     Non compliance with medical treatment     Pancreatitis     TB (pulmonary tuberculosis)     Tobacco abuse      Past Surgical History:   Procedure Laterality Date    ABSCESS DRAINAGE      APPENDECTOMY      CORNEAL TRANSPLANT       Family History   Problem Relation Age of Onset    Hypertension Mother     COPD Mother     Emphysema Mother     Cancer Maternal Grandmother      Social History   Substance Use Topics    Smoking status: Current Every " Day Smoker     Packs/day: 0.50     Years: 20.00    Smokeless tobacco: Not on file    Alcohol use 0.0 oz/week      Comment: per step father a 1/2 pint a day      Review of Systems   Constitutional: Positive for fatigue. Negative for chills and fever.        No real complaint   HENT: Negative for congestion, ear pain, rhinorrhea and sore throat.    Eyes: Negative for pain and visual disturbance.   Respiratory: Negative for cough, shortness of breath, wheezing and stridor.    Cardiovascular: Negative for chest pain.   Gastrointestinal: Negative for abdominal pain, diarrhea, nausea and vomiting.   Genitourinary: Negative for dysuria.   Musculoskeletal: Negative for back pain and neck pain.   Skin: Negative for rash.   Neurological: Negative for headaches.       Physical Exam     Initial Vitals [07/22/18 1830]   BP Pulse Resp Temp SpO2   121/75 (!) 116 18 98.1 °F (36.7 °C) 96 %      MAP       --         Physical Exam    Vitals reviewed.  Constitutional: She appears well-developed and well-nourished.   HENT:   Head: Normocephalic and atraumatic.   Nose: Nose normal.   Mouth/Throat: No oropharyngeal exudate.   Eyes: EOM are normal. Pupils are equal, round, and reactive to light.   Neck: Normal range of motion. Neck supple. No JVD present.   Cardiovascular: Regular rhythm and normal heart sounds. Exam reveals no gallop and no friction rub.    No murmur heard.  Pulmonary/Chest: Breath sounds normal. No stridor. No respiratory distress. She has no wheezes. She has no rhonchi. She has no rales. She exhibits no tenderness.   Abdominal: Soft. Bowel sounds are normal. She exhibits no distension and no mass. There is no tenderness. There is no rebound and no guarding.   Musculoskeletal: Normal range of motion. She exhibits no edema or tenderness.   Neurological: She is alert and oriented to person, place, and time. She has normal strength. No sensory deficit.   Skin: Skin is warm and dry.   Psychiatric: She has a normal mood and  affect. Thought content normal. Her speech is delayed. She expresses inappropriate judgment (giggling).         ED Course   Procedures  Labs Reviewed - No data to display       Imaging Results    None          Medical Decision Making:   History:   Old Medical Records: I decided to obtain old medical records.  ED Management:  The based upon reports it appears that the patient was given an accidental overdose of her methadone.  Patient was sent here for evaluation.  Patient was little lethargic but otherwise alert and oriented. She was giggling.  Patient had no complaint.  She was not hypoxic.  She was protecting her airway and had no respiratory distress.  There was no sign of anaphylaxis.  I do not detect any other organic etiology for her lethargy and giggling.  The patient states that she is ready to go back home.  She has had no adverse events while here in the emergency department today.  Her father is at bedside.  Will hold her methadone tomorrow morning.  Patient is stable for discharge back to the facility.    The results and physical exam findings were reviewed with the patient. Pt agrees with assessment, disposition and treatment plan and has no further questions or complaints at this time.    BRIDGETT Cavanaugh M.D. 8:04 PM 7/22/2018              Scribe Attestation:   Scribe #1: I performed the above scribed service and the documentation accurately describes the services I performed. I attest to the accuracy of the note.    Attending Attestation:           Physician Attestation for Scribe:  Physician Attestation Statement for Scribe #1: I, Ricky Cavanaugh MD, reviewed documentation, as scribed by Angelica Corrales in my presence, and it is both accurate and complete.                    Clinical Impression:   The primary encounter diagnosis was Methadone maintenance therapy patient. A diagnosis of Accidental drug overdose, initial encounter- Iatrogenic was also pertinent to this visit.                              Ricky Cavanaugh MD  07/22/18 2005

## 2018-07-22 NOTE — ED TRIAGE NOTES
Pt presents to the emergency department via EMS from [facility].  Facility reports pt getting much more methadone than her normal dose. EMS activated but on arrival pt ambulatory in her room asking to stay at [facility]. Denies complaints upon arrival.

## 2018-07-23 NOTE — ED NOTES
Attempted to give report back to [facility] and was put on hold twice for greater than 5 minutes. The third attempt no one answered the phone.

## 2019-01-19 ENCOUNTER — HOSPITAL ENCOUNTER (INPATIENT)
Facility: HOSPITAL | Age: 44
LOS: 8 days | Discharge: HOME | DRG: 975 | End: 2019-01-27
Attending: EMERGENCY MEDICINE | Admitting: INTERNAL MEDICINE
Payer: MEDICAID

## 2019-01-19 DIAGNOSIS — R41.82 ALTERED MENTAL STATUS, UNSPECIFIED ALTERED MENTAL STATUS TYPE: ICD-10-CM

## 2019-01-19 DIAGNOSIS — R41.82 ALTERED MENTAL STATUS: ICD-10-CM

## 2019-01-19 DIAGNOSIS — A41.9 SEPSIS, DUE TO UNSPECIFIED ORGANISM: ICD-10-CM

## 2019-01-19 DIAGNOSIS — B20 AIDS: Chronic | ICD-10-CM

## 2019-01-19 DIAGNOSIS — J18.9 PNEUMONIA DUE TO INFECTIOUS ORGANISM, UNSPECIFIED LATERALITY, UNSPECIFIED PART OF LUNG: Primary | ICD-10-CM

## 2019-01-19 DIAGNOSIS — N17.9 ACUTE RENAL FAILURE, UNSPECIFIED ACUTE RENAL FAILURE TYPE: ICD-10-CM

## 2019-01-19 DIAGNOSIS — F19.10 POLYSUBSTANCE ABUSE: ICD-10-CM

## 2019-01-19 DIAGNOSIS — B20 DEMENTIA DUE TO HIV INFECTION WITH BEHAVIORAL DISTURBANCE: ICD-10-CM

## 2019-01-19 DIAGNOSIS — F02.818 DEMENTIA DUE TO HIV INFECTION WITH BEHAVIORAL DISTURBANCE: ICD-10-CM

## 2019-01-19 PROBLEM — D69.6 THROMBOCYTOPENIA: Chronic | Status: ACTIVE | Noted: 2019-01-19

## 2019-01-19 LAB
ALBUMIN SERPL BCP-MCNC: 3.4 G/DL
ALP SERPL-CCNC: 81 U/L
ALT SERPL W/O P-5'-P-CCNC: 35 U/L
AMORPH CRY URNS QL MICRO: ABNORMAL
AMPHET+METHAMPHET UR QL: NEGATIVE
ANION GAP SERPL CALC-SCNC: 13 MMOL/L
AST SERPL-CCNC: 34 U/L
B-HCG UR QL: NEGATIVE
BACTERIA #/AREA URNS HPF: ABNORMAL /HPF
BARBITURATES UR QL SCN>200 NG/ML: NEGATIVE
BASOPHILS # BLD AUTO: 0.01 K/UL
BASOPHILS NFR BLD: 0.1 %
BENZODIAZ UR QL SCN>200 NG/ML: NEGATIVE
BILIRUB SERPL-MCNC: 0.5 MG/DL
BILIRUB UR QL STRIP: NEGATIVE
BUN SERPL-MCNC: 24 MG/DL
BZE UR QL SCN: NORMAL
CALCIUM SERPL-MCNC: 9.9 MG/DL
CANNABINOIDS UR QL SCN: NEGATIVE
CHLORIDE SERPL-SCNC: 99 MMOL/L
CLARITY UR: ABNORMAL
CO2 SERPL-SCNC: 22 MMOL/L
COLOR UR: YELLOW
CREAT SERPL-MCNC: 3.3 MG/DL
CREAT UR-MCNC: 79.3 MG/DL
CTP QC/QA: YES
DIFFERENTIAL METHOD: ABNORMAL
EOSINOPHIL # BLD AUTO: 0.1 K/UL
EOSINOPHIL NFR BLD: 1.2 %
ERYTHROCYTE [DISTWIDTH] IN BLOOD BY AUTOMATED COUNT: 15.7 %
EST. GFR  (AFRICAN AMERICAN): 19 ML/MIN/1.73 M^2
EST. GFR  (NON AFRICAN AMERICAN): 16 ML/MIN/1.73 M^2
ETHANOL SERPL-MCNC: <10 MG/DL
FLUAV AG SPEC QL IA: NEGATIVE
FLUBV AG SPEC QL IA: NEGATIVE
GLUCOSE SERPL-MCNC: 69 MG/DL
GLUCOSE UR QL STRIP: NEGATIVE
GRAN CASTS #/AREA URNS LPF: 6 /LPF
HCT VFR BLD AUTO: 33.8 %
HGB BLD-MCNC: 11.4 G/DL
HGB UR QL STRIP: NEGATIVE
HYALINE CASTS #/AREA URNS LPF: 15 /LPF
KETONES UR QL STRIP: NEGATIVE
LACTATE SERPL-SCNC: 2.3 MMOL/L
LDH SERPL L TO P-CCNC: 248 U/L
LEUKOCYTE ESTERASE UR QL STRIP: NEGATIVE
LYMPHOCYTES # BLD AUTO: 0.7 K/UL
LYMPHOCYTES NFR BLD: 7.3 %
MCH RBC QN AUTO: 30.2 PG
MCHC RBC AUTO-ENTMCNC: 33.7 G/DL
MCV RBC AUTO: 90 FL
METHADONE UR QL SCN>300 NG/ML: NORMAL
MICROSCOPIC COMMENT: ABNORMAL
MONOCYTES # BLD AUTO: 0.4 K/UL
MONOCYTES NFR BLD: 4.9 %
NEUTROPHILS # BLD AUTO: 7.7 K/UL
NEUTROPHILS NFR BLD: 86.5 %
NITRITE UR QL STRIP: NEGATIVE
OPIATES UR QL SCN: NORMAL
PCP UR QL SCN>25 NG/ML: NEGATIVE
PH UR STRIP: 5 [PH] (ref 5–8)
PLATELET # BLD AUTO: 108 K/UL
PMV BLD AUTO: 11.6 FL
POCT GLUCOSE: 84 MG/DL (ref 70–110)
POTASSIUM SERPL-SCNC: 3.7 MMOL/L
PROT SERPL-MCNC: 9.7 G/DL
PROT UR QL STRIP: ABNORMAL
RBC # BLD AUTO: 3.77 M/UL
RBC #/AREA URNS HPF: 1 /HPF (ref 0–4)
SODIUM SERPL-SCNC: 134 MMOL/L
SP GR UR STRIP: 1.02 (ref 1–1.03)
SPECIMEN SOURCE: NORMAL
SQUAMOUS #/AREA URNS HPF: ABNORMAL /HPF
TOXICOLOGY INFORMATION: NORMAL
TROPONIN I SERPL DL<=0.01 NG/ML-MCNC: <0.006 NG/ML
URN SPEC COLLECT METH UR: ABNORMAL
UROBILINOGEN UR STRIP-ACNC: NEGATIVE EU/DL
WBC # BLD AUTO: 8.86 K/UL
WBC #/AREA URNS HPF: 4 /HPF (ref 0–5)

## 2019-01-19 PROCEDURE — 80320 DRUG SCREEN QUANTALCOHOLS: CPT

## 2019-01-19 PROCEDURE — 84484 ASSAY OF TROPONIN QUANT: CPT

## 2019-01-19 PROCEDURE — 81000 URINALYSIS NONAUTO W/SCOPE: CPT | Mod: 59

## 2019-01-19 PROCEDURE — 81025 URINE PREGNANCY TEST: CPT | Performed by: EMERGENCY MEDICINE

## 2019-01-19 PROCEDURE — 25000003 PHARM REV CODE 250: Performed by: EMERGENCY MEDICINE

## 2019-01-19 PROCEDURE — 96375 TX/PRO/DX INJ NEW DRUG ADDON: CPT

## 2019-01-19 PROCEDURE — 11000001 HC ACUTE MED/SURG PRIVATE ROOM

## 2019-01-19 PROCEDURE — 63600175 PHARM REV CODE 636 W HCPCS: Performed by: EMERGENCY MEDICINE

## 2019-01-19 PROCEDURE — 87400 INFLUENZA A/B EACH AG IA: CPT | Mod: 59

## 2019-01-19 PROCEDURE — 25000003 PHARM REV CODE 250: Performed by: HOSPITALIST

## 2019-01-19 PROCEDURE — 93010 ELECTROCARDIOGRAM REPORT: CPT | Mod: ,,, | Performed by: INTERNAL MEDICINE

## 2019-01-19 PROCEDURE — 85025 COMPLETE CBC W/AUTO DIFF WBC: CPT

## 2019-01-19 PROCEDURE — S4991 NICOTINE PATCH NONLEGEND: HCPCS | Performed by: EMERGENCY MEDICINE

## 2019-01-19 PROCEDURE — 80307 DRUG TEST PRSMV CHEM ANLYZR: CPT

## 2019-01-19 PROCEDURE — 83615 LACTATE (LD) (LDH) ENZYME: CPT

## 2019-01-19 PROCEDURE — 86361 T CELL ABSOLUTE COUNT: CPT

## 2019-01-19 PROCEDURE — 99291 CRITICAL CARE FIRST HOUR: CPT | Mod: 25

## 2019-01-19 PROCEDURE — 93005 ELECTROCARDIOGRAM TRACING: CPT

## 2019-01-19 PROCEDURE — 83605 ASSAY OF LACTIC ACID: CPT

## 2019-01-19 PROCEDURE — 93010 EKG 12-LEAD: ICD-10-PCS | Mod: ,,, | Performed by: INTERNAL MEDICINE

## 2019-01-19 PROCEDURE — 87040 BLOOD CULTURE FOR BACTERIA: CPT

## 2019-01-19 PROCEDURE — 96374 THER/PROPH/DIAG INJ IV PUSH: CPT

## 2019-01-19 PROCEDURE — 82962 GLUCOSE BLOOD TEST: CPT

## 2019-01-19 PROCEDURE — 80053 COMPREHEN METABOLIC PANEL: CPT

## 2019-01-19 PROCEDURE — S0039 INJECTION, SULFAMETHOXAZOLE: HCPCS | Performed by: EMERGENCY MEDICINE

## 2019-01-19 RX ORDER — MONTELUKAST SODIUM 10 MG/1
10 TABLET ORAL NIGHTLY
Status: DISCONTINUED | OUTPATIENT
Start: 2019-01-19 | End: 2019-01-27 | Stop reason: HOSPADM

## 2019-01-19 RX ORDER — IBUPROFEN 200 MG
1 TABLET ORAL DAILY
Status: DISCONTINUED | OUTPATIENT
Start: 2019-01-19 | End: 2019-01-27 | Stop reason: HOSPADM

## 2019-01-19 RX ORDER — HYDROXYZINE HYDROCHLORIDE 25 MG/1
25 TABLET, FILM COATED ORAL 3 TIMES DAILY
Status: ON HOLD | COMMUNITY
End: 2019-02-07 | Stop reason: HOSPADM

## 2019-01-19 RX ORDER — LACTULOSE 10 G/15ML
SOLUTION ORAL; RECTAL 3 TIMES DAILY
Status: ON HOLD | COMMUNITY
End: 2019-02-07 | Stop reason: HOSPADM

## 2019-01-19 RX ORDER — HYDROXYZINE HYDROCHLORIDE 25 MG/1
25 TABLET, FILM COATED ORAL 3 TIMES DAILY
Status: DISCONTINUED | OUTPATIENT
Start: 2019-01-19 | End: 2019-01-26

## 2019-01-19 RX ORDER — LORAZEPAM 2 MG/ML
1 INJECTION INTRAMUSCULAR
Status: COMPLETED | OUTPATIENT
Start: 2019-01-19 | End: 2019-01-19

## 2019-01-19 RX ORDER — DIPHENHYDRAMINE HCL 50 MG
50 CAPSULE ORAL NIGHTLY PRN
Status: DISCONTINUED | OUTPATIENT
Start: 2019-01-19 | End: 2019-01-27 | Stop reason: HOSPADM

## 2019-01-19 RX ORDER — METHADONE HYDROCHLORIDE 10 MG/1
10 TABLET ORAL EVERY 12 HOURS
Status: DISCONTINUED | OUTPATIENT
Start: 2019-01-19 | End: 2019-01-21

## 2019-01-19 RX ORDER — PANTOPRAZOLE SODIUM 40 MG/1
40 TABLET, DELAYED RELEASE ORAL DAILY
Status: DISCONTINUED | OUTPATIENT
Start: 2019-01-19 | End: 2019-01-27 | Stop reason: HOSPADM

## 2019-01-19 RX ORDER — FERROUS SULFATE 325(65) MG
325 TABLET ORAL
COMMUNITY

## 2019-01-19 RX ORDER — OMEPRAZOLE 20 MG/1
20 CAPSULE, DELAYED RELEASE ORAL DAILY
COMMUNITY

## 2019-01-19 RX ORDER — BUSPIRONE HYDROCHLORIDE 5 MG/1
10 TABLET ORAL 3 TIMES DAILY
Status: DISCONTINUED | OUTPATIENT
Start: 2019-01-19 | End: 2019-01-27 | Stop reason: HOSPADM

## 2019-01-19 RX ORDER — ERGOCALCIFEROL 1.25 MG/1
50000 CAPSULE ORAL
COMMUNITY

## 2019-01-19 RX ORDER — ACETAMINOPHEN 325 MG/1
650 TABLET ORAL EVERY 6 HOURS PRN
Status: DISCONTINUED | OUTPATIENT
Start: 2019-01-19 | End: 2019-01-25

## 2019-01-19 RX ORDER — CYPROHEPTADINE HYDROCHLORIDE 2 MG/5ML
SOLUTION ORAL
COMMUNITY

## 2019-01-19 RX ORDER — ACYCLOVIR 200 MG/1
400 CAPSULE ORAL 3 TIMES DAILY
Status: DISCONTINUED | OUTPATIENT
Start: 2019-01-19 | End: 2019-01-27 | Stop reason: HOSPADM

## 2019-01-19 RX ORDER — HYDROXYCHLOROQUINE SULFATE 200 MG/1
200 TABLET, FILM COATED ORAL 2 TIMES DAILY
Status: DISCONTINUED | OUTPATIENT
Start: 2019-01-19 | End: 2019-01-27 | Stop reason: HOSPADM

## 2019-01-19 RX ORDER — EMTRICITABINE AND TENOFOVIR DISOPROXIL FUMARATE 200; 300 MG/1; MG/1
1 TABLET, FILM COATED ORAL DAILY
Status: ON HOLD | COMMUNITY
End: 2019-01-27 | Stop reason: HOSPADM

## 2019-01-19 RX ORDER — MEGESTROL ACETATE 20 MG/1
40 TABLET ORAL DAILY
Status: DISCONTINUED | OUTPATIENT
Start: 2019-01-19 | End: 2019-01-27 | Stop reason: HOSPADM

## 2019-01-19 RX ORDER — AMOXICILLIN 250 MG
1 CAPSULE ORAL 2 TIMES DAILY
Status: DISCONTINUED | OUTPATIENT
Start: 2019-01-19 | End: 2019-01-27 | Stop reason: HOSPADM

## 2019-01-19 RX ORDER — ONDANSETRON HYDROCHLORIDE 8 MG/1
8 TABLET, FILM COATED ORAL
Status: ON HOLD | COMMUNITY
End: 2019-02-07 | Stop reason: HOSPADM

## 2019-01-19 RX ORDER — EMTRICITABINE AND TENOFOVIR DISOPROXIL FUMARATE 200; 300 MG/1; MG/1
1 TABLET, FILM COATED ORAL DAILY
Status: DISCONTINUED | OUTPATIENT
Start: 2019-01-19 | End: 2019-01-21

## 2019-01-19 RX ORDER — CITALOPRAM 20 MG/1
20 TABLET, FILM COATED ORAL DAILY
Status: DISCONTINUED | OUTPATIENT
Start: 2019-01-19 | End: 2019-01-27 | Stop reason: HOSPADM

## 2019-01-19 RX ORDER — MONTELUKAST SODIUM 10 MG/1
10 TABLET ORAL NIGHTLY
COMMUNITY

## 2019-01-19 RX ORDER — DIPHENHYDRAMINE HCL 25 MG
25 CAPSULE ORAL
Status: COMPLETED | OUTPATIENT
Start: 2019-01-19 | End: 2019-01-19

## 2019-01-19 RX ORDER — BUSPIRONE HYDROCHLORIDE 10 MG/1
10 TABLET ORAL 3 TIMES DAILY
Status: ON HOLD | COMMUNITY
End: 2019-02-07 | Stop reason: HOSPADM

## 2019-01-19 RX ORDER — HYDROXYCHLOROQUINE SULFATE 200 MG/1
200 TABLET, FILM COATED ORAL 2 TIMES DAILY
COMMUNITY

## 2019-01-19 RX ORDER — SODIUM CHLORIDE 0.9 % (FLUSH) 0.9 %
5 SYRINGE (ML) INJECTION
Status: DISCONTINUED | OUTPATIENT
Start: 2019-01-19 | End: 2019-01-27 | Stop reason: HOSPADM

## 2019-01-19 RX ORDER — KETOROLAC TROMETHAMINE 30 MG/ML
15 INJECTION, SOLUTION INTRAMUSCULAR; INTRAVENOUS
Status: COMPLETED | OUTPATIENT
Start: 2019-01-19 | End: 2019-01-19

## 2019-01-19 RX ORDER — DEXTROSE 50 % IN WATER (D50W) INTRAVENOUS SYRINGE
12.5
Status: COMPLETED | OUTPATIENT
Start: 2019-01-19 | End: 2019-01-19

## 2019-01-19 RX ADMIN — SENNOSIDES AND DOCUSATE SODIUM 1 TABLET: 8.6; 5 TABLET ORAL at 08:01

## 2019-01-19 RX ADMIN — BUSPIRONE HYDROCHLORIDE 10 MG: 10 TABLET ORAL at 08:01

## 2019-01-19 RX ADMIN — Medication 500 MG: at 12:01

## 2019-01-19 RX ADMIN — HYDROXYCHLOROQUINE SULFATE 200 MG: 200 TABLET, FILM COATED ORAL at 12:01

## 2019-01-19 RX ADMIN — DEXTROSE MONOHYDRATE 12.5 G: 25 INJECTION, SOLUTION INTRAVENOUS at 08:01

## 2019-01-19 RX ADMIN — CEFTRIAXONE 1 G: 1 INJECTION, SOLUTION INTRAVENOUS at 10:01

## 2019-01-19 RX ADMIN — PANCRELIPASE LIPASE, PANCRELIPASE PROTEASE, PANCRELIPASE AMYLASE 1 CAPSULE: 5000; 17000; 24000 CAPSULE, DELAYED RELEASE ORAL at 12:01

## 2019-01-19 RX ADMIN — EMTRICITABINE AND TENOFOVIR DISOPROXIL FUMARATE 1 TABLET: 200; 300 TABLET, FILM COATED ORAL at 12:01

## 2019-01-19 RX ADMIN — HYDROXYZINE HYDROCHLORIDE 25 MG: 25 TABLET, FILM COATED ORAL at 02:01

## 2019-01-19 RX ADMIN — DIPHENHYDRAMINE HYDROCHLORIDE 50 MG: 50 CAPSULE ORAL at 08:01

## 2019-01-19 RX ADMIN — SULFAMETHOXAZOLE AND TRIMETHOPRIM 303 MG: 80; 16 INJECTION, SOLUTION, CONCENTRATE INTRAVENOUS at 12:01

## 2019-01-19 RX ADMIN — SODIUM CHLORIDE 1000 ML: 0.9 INJECTION, SOLUTION INTRAVENOUS at 08:01

## 2019-01-19 RX ADMIN — MEGESTROL ACETATE 40 MG: 20 TABLET ORAL at 12:01

## 2019-01-19 RX ADMIN — METHADONE HYDROCHLORIDE 10 MG: 10 TABLET ORAL at 08:01

## 2019-01-19 RX ADMIN — BUSPIRONE HYDROCHLORIDE 10 MG: 10 TABLET ORAL at 02:01

## 2019-01-19 RX ADMIN — MONTELUKAST SODIUM 10 MG: 10 TABLET, FILM COATED ORAL at 08:01

## 2019-01-19 RX ADMIN — METHADONE HYDROCHLORIDE 10 MG: 10 TABLET ORAL at 12:01

## 2019-01-19 RX ADMIN — ACYCLOVIR 400 MG: 200 CAPSULE ORAL at 02:01

## 2019-01-19 RX ADMIN — CITALOPRAM HYDROBROMIDE 20 MG: 20 TABLET ORAL at 12:01

## 2019-01-19 RX ADMIN — PANCRELIPASE LIPASE, PANCRELIPASE PROTEASE, PANCRELIPASE AMYLASE 1 CAPSULE: 5000; 17000; 24000 CAPSULE, DELAYED RELEASE ORAL at 06:01

## 2019-01-19 RX ADMIN — HYDROXYCHLOROQUINE SULFATE 200 MG: 200 TABLET, FILM COATED ORAL at 08:01

## 2019-01-19 RX ADMIN — DIPHENHYDRAMINE HYDROCHLORIDE 25 MG: 25 CAPSULE ORAL at 09:01

## 2019-01-19 RX ADMIN — LORAZEPAM 1 MG: 2 INJECTION, SOLUTION INTRAMUSCULAR; INTRAVENOUS at 07:01

## 2019-01-19 RX ADMIN — SODIUM CHLORIDE 1000 ML: 0.9 INJECTION, SOLUTION INTRAVENOUS at 07:01

## 2019-01-19 RX ADMIN — HYDROXYZINE HYDROCHLORIDE 25 MG: 25 TABLET, FILM COATED ORAL at 08:01

## 2019-01-19 RX ADMIN — SULFAMETHOXAZOLE AND TRIMETHOPRIM 303 MG: 80; 16 INJECTION, SOLUTION, CONCENTRATE INTRAVENOUS at 08:01

## 2019-01-19 RX ADMIN — KETOROLAC TROMETHAMINE 15 MG: 30 INJECTION INTRAMUSCULAR; INTRAVENOUS at 08:01

## 2019-01-19 RX ADMIN — SENNOSIDES AND DOCUSATE SODIUM 1 TABLET: 8.6; 5 TABLET ORAL at 12:01

## 2019-01-19 RX ADMIN — ACETAMINOPHEN 650 MG: 325 TABLET ORAL at 08:01

## 2019-01-19 RX ADMIN — NICOTINE 1 PATCH: 21 PATCH, EXTENDED RELEASE TRANSDERMAL at 11:01

## 2019-01-19 RX ADMIN — PANTOPRAZOLE SODIUM 40 MG: 40 TABLET, DELAYED RELEASE ORAL at 12:01

## 2019-01-19 RX ADMIN — ACYCLOVIR 400 MG: 200 CAPSULE ORAL at 08:01

## 2019-01-19 NOTE — ED PROVIDER NOTES
"Encounter Date: 1/19/2019    SCRIBE #1 NOTE: I, Teresa Olivareselton, am scribing for, and in the presence of,  Bala Kline Jr., MD. I have scribed the following portions of the note - Other sections scribed: HPI, ROS.       History     Chief Complaint   Patient presents with    Anxiety     Per EMS, call came out as anxiety attack with pt stating that she was out of her xanax. While beingg triaged pt will only say " Im sick"     CC: Anxiety    HPI: This is a 42 y/o female with PMHx of AIDS, Blind right eye, Cocaine use, HIV dementia, Malnutrition, Pancreatitis, TB, and Tobacco abuse who presents to the ED via EMS for emergent evaluation of anxiety. Per EMS, they got a call that pt was having an anxiety attack and out of her Xanax. EMS reports that pt was non combative and not communicating much on their arrival. Family told EMS that pt is on many medications. Pt recently had her Truvada prescription filled. 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 HPI and ROS are otherwise limited secondary to pt verbalizing without meaningful context.        The history is provided by the EMS personnel. The history is limited by the condition of the patient ( verbalizing without meaningful context). No  was used.     Review of patient's allergies indicates:  No Known Allergies  Past Medical History:   Diagnosis Date    AIDS     Blind right eye     Cocaine use     HIV dementia     Malnutrition     Non compliance with medical treatment     Pancreatitis     TB (pulmonary tuberculosis)     Tobacco abuse      Past Surgical History:   Procedure Laterality Date    ABSCESS DRAINAGE      APPENDECTOMY      ASPIRATION ABSCESS FACE N/A 6/2/2015    [facility]    BRONCHOSCOPY N/A 7/26/2016    [facility] Jamil Ryder [facility] at [facility] ENDO    CORNEAL TRANSPLANT       Family History "   Problem Relation Age of Onset    Hypertension Mother     COPD Mother     Emphysema Mother     Cancer Maternal Grandmother      Social History     Tobacco Use    Smoking status: Current Every Day Smoker     Packs/day: 0.50     Years: 20.00     Pack years: 10.00    Smokeless tobacco: Never Used   Substance Use Topics    Alcohol use: Yes     Alcohol/week: 0.0 oz     Comment: per step father a 1/2 pint a day     Drug use: Yes     Frequency: 1.0 times per week     Types: Cocaine, Benzodiazepines, Marijuana     Comment: last used cocaine a few days ago     Review of Systems   Unable to perform ROS: Dementia ( verbalizing without meaningful context)       Physical Exam     Initial Vitals [01/19/19 0701]   BP Pulse Resp Temp SpO2   (!) 105/56 84 20 98.6 °F (37 °C) 95 %      MAP       --         Physical Exam    Nursing note and vitals reviewed.  Constitutional: She is not diaphoretic. No distress.   Patient is cachectic.   HENT:   Head: Normocephalic and atraumatic.   Right Ear: External ear normal.   Left Ear: External ear normal.   Nose: Nose normal.   Mouth/Throat: Oropharynx is clear and moist.   Eyes: Conjunctivae and EOM are normal. Pupils are equal, round, and reactive to light. Right eye exhibits no discharge. Left eye exhibits no discharge. No scleral icterus.   Neck: Normal range of motion. Neck supple.   Cardiovascular: Normal rate, regular rhythm, normal heart sounds and intact distal pulses.   No murmur heard.  Pulmonary/Chest: Breath sounds normal. No respiratory distress. She has no wheezes. She has no rhonchi. She has no rales.   Abdominal: Soft. Bowel sounds are normal. She exhibits no distension and no mass. There is no tenderness. There is no rebound and no guarding.   Musculoskeletal: Normal range of motion. She exhibits no edema or tenderness.   Neurological: She has normal strength. No cranial nerve deficit or sensory deficit.   Patient is sleeping and will not respond to verbal stimulus.   She is making verbalizations but without meaningful context.   Skin: Skin is warm and dry. No rash noted. No erythema. No pallor.         ED Course   Critical Care  Date/Time: 1/19/2019 1:26 PM  Performed by: Bala Kline Jr., MD  Authorized by: Tano Benjamin DO   Direct patient critical care time: 5 minutes  Additional history critical care time: 10 minutes  Ordering / reviewing critical care time: 5 minutes  Documentation critical care time: 5 minutes  Consulting other physicians critical care time: 5 minutes  Consult with family critical care time: 5 minutes  Total critical care time (exclusive of procedural time) : 35 minutes  Critical care was necessary to treat or prevent imminent or life-threatening deterioration of the following conditions: sepsis.  Critical care was time spent personally by me on the following activities: discussions with consultants, development of treatment plan with patient or surrogate, evaluation of patient's response to treatment, examination of patient, interpretation of cardiac output measurements, ordering and performing treatments and interventions, obtaining history from patient or surrogate, pulse oximetry, ordering and review of laboratory studies, review of old charts, ordering and review of radiographic studies and re-evaluation of patient's condition.        Labs Reviewed   URINALYSIS, REFLEX TO URINE CULTURE - Abnormal; Notable for the following components:       Result Value    Appearance, UA Cloudy (*)     Protein, UA 1+ (*)     All other components within normal limits    Narrative:     Preferred Collection Type->Urine, Clean Catch   CBC W/ AUTO DIFFERENTIAL - Abnormal; Notable for the following components:    RBC 3.77 (*)     Hemoglobin 11.4 (*)     Hematocrit 33.8 (*)     RDW 15.7 (*)     Platelets 108 (*)     Lymph # 0.7 (*)     Gran% 86.5 (*)     Lymph% 7.3 (*)     All other components within normal limits   COMPREHENSIVE METABOLIC PANEL - Abnormal; Notable  for the following components:    Sodium 134 (*)     CO2 22 (*)     Glucose 69 (*)     BUN, Bld 24 (*)     Creatinine 3.3 (*)     Total Protein 9.7 (*)     Albumin 3.4 (*)     eGFR if  19 (*)     eGFR if non  16 (*)     All other components within normal limits   LACTIC ACID, PLASMA - Abnormal; Notable for the following components:    Lactate (Lactic Acid) 2.3 (*)     All other components within normal limits   URINALYSIS MICROSCOPIC - Abnormal; Notable for the following components:    Bacteria, UA Few (*)     Hyaline Casts, UA 15 (*)     Granular Casts, UA 6 (*)     All other components within normal limits    Narrative:     Preferred Collection Type->Urine, Clean Catch   CULTURE, BLOOD   CULTURE, BLOOD   ALCOHOL,MEDICAL (ETHANOL)   DRUG SCREEN PANEL, URINE EMERGENCY    Narrative:     Preferred Collection Type->Urine, Clean Catch   TROPONIN I   LACTATE DEHYDROGENASE   INFLUENZA A AND B ANTIGEN   T-HELPER CELLS (CD4) COUNT   POCT URINE PREGNANCY     EKG Readings: (Independently Interpreted)   EKG reviewed and interpreted by me shows sinus rhythm at a rate of 88.  AR, QRS, QT intervals within normal limits.  There is normal axis.  There is normal R-wave progression.  There are no ST segment or T-wave ischemic findings.         Imaging Results          CT Head Without Contrast (Final result)  Result time 01/19/19 08:23:23    Final result by Tyler Barker III, MD (01/19/19 08:23:23)                 Impression:      See above    No acute process seen.      Electronically signed by: Tyler Barker MD  Date:    01/19/2019  Time:    08:23             Narrative:    EXAMINATION:  CT HEAD WITHOUT CONTRAST    CLINICAL HISTORY:  Confusion/delirium, altered LOC, unexplained;    FINDINGS:  No bleed, mass, or mass effect seen.  The brain parenchyma is unremarkable.  No skull lesion or skull fracture seen.  There is postoperative change of the right orbit.  There is sinusitis change.                                X-Ray Chest AP Portable (Final result)  Result time 01/19/19 07:58:36    Final result by Tyler Barker III, MD (01/19/19 07:58:36)                 Impression:      See above    Mild interstitial edema versus pneumonia.      Electronically signed by: Tyler Barker MD  Date:    01/19/2019  Time:    07:58             Narrative:    EXAMINATION:  XR CHEST AP PORTABLE    CLINICAL HISTORY:  Altered mental status, unspecified    FINDINGS:  Heart size is upper normal.  There is mild edema and no change.                              X-Rays:   Independently Interpreted Readings:   Other Readings:  Chest x-ray reveals interstitial infiltrates.    Medical Decision Making:   ED Management:  This is the emergent evaluation of a 33-year-old female presents to the emergency department for evaluation of which she initially had stated to be anxiety.  EMS was called to her house for this reason.   stated she was out of Xanax.  Patient now not speaking and appears sleepy.  Differential diagnosis at the time of initial evaluation included, but was not limited to:  Anxiety or other psychiatric decompensation, metabolic disturbance, dehydration, sepsis, intracranial hemorrhage, delirium.  I reviewed the patient's chart.  Admissions for delirium in the setting of HIV/ AIDS.  Therefore, expanded workup undertaken.    Patient states she is compliant with her Truvada medication.  However, her father in law who is at the bedside also states that she smokes crack and has been smoking crack recently.  Patient has a cough.  She is not short of breath or hypoxic.  However, there appeared to be bilateral interstitial infiltrates consistent with possible pneumonia.  CD4 count is pending.  Patient will be treated empirically for Pneumocystis pneumonia as well as his community-acquired pneumonia.  She has an elevated lactate.  Her mental status has improved with IV fluids and with dextrose.  She has acute kidney injury. She  will be admitted to the internal medicine service.  The case was discussed with Dr. Benjamin, who agrees with the plan.  Patient stable at the time of admission.            Scribe Attestation:   Scribe #1: I performed the above scribed service and the documentation accurately describes the services I performed. I attest to the accuracy of the note.    Attending Attestation:           Physician Attestation for Scribe:  Physician Attestation Statement for Scribe #1: I, Bala Kline Jr., MD, reviewed documentation, as scribed by Teresa Corbett in my presence, and it is both accurate and complete.                    Clinical Impression:   The primary encounter diagnosis was Pneumonia due to infectious organism, unspecified laterality, unspecified part of lung. Diagnoses of Altered mental status, Sepsis, due to unspecified organism, and Acute renal failure, unspecified acute renal failure type were also pertinent to this visit.                             Bala Kline Jr., MD  01/19/19 1975

## 2019-01-19 NOTE — ASSESSMENT & PLAN NOTE
Admittedly non-compliant with medications at home  Will continue her Truvada here  Will check CD4 count as patient cannot tell me what her last CD4 count was and does not remember the name of her ID doctor

## 2019-01-19 NOTE — SUBJECTIVE & OBJECTIVE
Past Medical History:   Diagnosis Date    AIDS     Blind right eye     Cocaine use     HIV dementia     Malnutrition     Non compliance with medical treatment     Pancreatitis     TB (pulmonary tuberculosis)     Tobacco abuse        Past Surgical History:   Procedure Laterality Date    ABSCESS DRAINAGE      APPENDECTOMY      ASPIRATION ABSCESS FACE N/A 6/2/2015    [facility] at [facility] OR    BRONCHOSCOPY N/A 7/26/2016    Performed by Jamil Ryder MD at [facility] ENDO    CORNEAL TRANSPLANT         Review of patient's allergies indicates:  No Known Allergies    No current facility-administered medications on file prior to encounter.      Current Outpatient Medications on File Prior to Encounter   Medication Sig    acyclovir (ZOVIRAX) 200 MG capsule Take 2 capsules (400 mg total) by mouth 3 (three) times daily.    busPIRone (BUSPAR) 10 MG tablet Take 10 mg by mouth 3 (three) times daily.    citalopram (CELEXA) 20 MG tablet Take 1 tablet (20 mg total) by mouth once daily.    cyproheptadine (,PERIACTIN,) 2 mg/5 mL syrup Take by mouth every 8 (eight) hours.    emtricitabine-tenofovir 200-300 mg (TRUVADA) 200-300 mg Tab Take 1 tablet by mouth once daily.    ergocalciferol (VITAMIN D2) 50,000 unit Cap Take 50,000 Units by mouth every 7 days.    ferrous sulfate (FEOSOL) 325 mg (65 mg iron) Tab tablet Take 325 mg by mouth daily with breakfast.    hydroxychloroquine (PLAQUENIL) 200 mg tablet Take 200 mg by mouth 2 (two) times daily.    hydrOXYzine HCl (ATARAX) 25 MG tablet Take 25 mg by mouth 3 (three) times daily.    lipase/protease/amylase (CREON ORAL) Take 6,000 capsules by mouth 2 (two) times daily.    montelukast (SINGULAIR) 10 mg tablet Take 10 mg by mouth every evening.    omeprazole (PRILOSEC) 20 MG capsule Take 20 mg by mouth once daily.    ondansetron (ZOFRAN) 8 MG tablet Take 8 mg by mouth every 8 (eight) hours.    azithromycin (ZITHROMAX) 500 MG tablet Take 1 tablet (500  mg total) by mouth once daily.    hydrocortisone 1 % cream Apply topically 2 (two) times daily.    ibuprofen (ADVIL,MOTRIN) 600 MG tablet Take 1 tablet (600 mg total) by mouth every 6 (six) hours as needed for Pain.    lactulose (CHRONULAC) 10 gram/15 mL solution Take by mouth 3 (three) times daily.    megestrol (MEGACE) 40 MG Tab Take 1 tablet (40 mg total) by mouth once daily.    methadone (DOLOPHINE) 10 mg/mL solution Take 10 mg by mouth 2 (two) times daily.    miconazole (MICOTIN) 2 % vaginal cream Place 1 applicator vaginally every evening.    nicotine (NICODERM CQ) 21 mg/24 hr Place 1 patch onto the skin once daily.    oxycodone-acetaminophen (PERCOCET) 2.5-325 mg per tablet Take 1 tablet by mouth every 8 (eight) hours as needed for Pain.    sulfamethoxazole-trimethoprim 800-160mg (BACTRIM DS) 800-160 mg Tab Take 1 tablet by mouth once daily.     Family History     Problem Relation (Age of Onset)    COPD Mother    Cancer Maternal Grandmother    Emphysema Mother    Hypertension Mother        Tobacco Use    Smoking status: Current Every Day Smoker     Packs/day: 0.50     Years: 20.00     Pack years: 10.00    Smokeless tobacco: Never Used   Substance and Sexual Activity    Alcohol use: Yes     Alcohol/week: 0.0 oz     Comment: per step father a 1/2 pint a day     Drug use: Yes     Frequency: 1.0 times per week     Types: Cocaine, Benzodiazepines, Marijuana     Comment: last used cocaine a few days ago    Sexual activity: Not on file     Review of Systems   Constitutional: Negative for chills and fever.   Respiratory: Positive for cough. Negative for shortness of breath.    Cardiovascular: Negative for chest pain.   Gastrointestinal: Positive for abdominal pain, nausea and vomiting. Negative for blood in stool, constipation and diarrhea.   Psychiatric/Behavioral: Positive for agitation.   All other systems reviewed and are negative.    Objective:     Vital Signs (Most Recent):  Temp: 98.2 °F (36.8  °C) (01/19/19 1104)  Pulse: 99 (01/19/19 1104)  Resp: 18 (01/19/19 1104)  BP: 111/64 (01/19/19 1104)  SpO2: 97 % (01/19/19 1104) Vital Signs (24h Range):  Temp:  [98.2 °F (36.8 °C)-98.6 °F (37 °C)] 98.2 °F (36.8 °C)  Pulse:  [84-99] 99  Resp:  [18-29] 18  SpO2:  [95 %-100 %] 97 %  BP: ()/(56-68) 111/64     Weight: 45.4 kg (100 lb)  Body mass index is 18.29 kg/m².    Physical Exam   Constitutional: Vital signs are normal. She has a sickly appearance. No distress.   HENT:   Head: Normocephalic and atraumatic.   Mouth/Throat: Mucous membranes are dry.   Mucosal ulcers noted   Eyes: Pupils are equal, round, and reactive to light.   Neck: No JVD present.   Cardiovascular: Normal rate and regular rhythm.   No murmur heard.  Pulmonary/Chest: Effort normal. She has no wheezes. She has rales in the right lower field.   Abdominal: Soft. Normal appearance and bowel sounds are normal. She exhibits no distension. There is no tenderness.   Neurological: She is alert.   Psychiatric: Judgment normal. Her mood appears anxious. She exhibits a depressed mood.         CRANIAL NERVES     CN III, IV, VI   Pupils are equal, round, and reactive to light.       Significant Labs:   Recent Lab Results       01/19/19  1000   01/19/19  0959   01/19/19  0900   01/19/19  0827   01/19/19  0742        Benzodiazepines       Negative       Methadone metabolites       Presumptive Positive       Phencyclidine       Negative       Albumin         3.4     Alcohol, Medical, Serum         <10     Alkaline Phosphatase         81     ALT         35     Amorphous, UA       Moderate       Amphetamine Screen, Ur       Negative       Anion Gap         13     Appearance, UA       Cloudy       AST         34     Bacteria, UA       Few       Barbiturate Screen, Ur       Negative       Baso #         0.01     Basophil%         0.1     Bilirubin (UA)       Negative       Total Bilirubin         0.5  Comment:  For infants and newborns, interpretation of results  should be based  on gestational age, weight and in agreement with clinical  observations.  Premature Infant recommended reference ranges:  Up to 24 hours.............<8.0 mg/dL  Up to 48 hours............<12.0 mg/dL  3-5 days..................<15.0 mg/dL  6-29 days.................<15.0 mg/dL       BUN, Bld         24     Calcium         9.9     Chloride         99     CO2         22     Cocaine (Metab.)       Presumptive Positive       Color, UA       Yellow       Creatinine         3.3     Creatinine, Random Ur       79.3  Comment:  The random urine reference ranges provided were established   for 24 hour urine collections.  No reference ranges exist for  random urine specimens.  Correlate clinically.         Differential Method         Automated     eGFR if          19     eGFR if non          16  Comment:  Calculation used to obtain the estimated glomerular filtration  rate (eGFR) is the CKD-EPI equation.        Eos #         0.1     Eosinophil%         1.2     Flu A & B Source Nasopharyngeal Swab             Glucose         69     Glucose, UA       Negative       Gran # (ANC)         7.7     Gran%         86.5     Granular Casts, UA       6       Hematocrit         33.8     Hemoglobin         11.4     Hyaline Casts, UA       15       Influenza A Ag, EIA Negative             Influenza B Ag, EIA Negative             Ketones, UA       Negative       Lactate, Bashir         2.3  Comment:  Falsely low lactic acid results can be found in samples   containing >=13.0 mg/dL total bilirubin and/or >=3.5 mg/dL   direct bilirubin.       LD         248  Comment:  Results are increased in hemolyzed samples.     Leukocytes, UA       Negative       Lymph #         0.7     Lymph%         7.3     MCH         30.2     MCHC         33.7     MCV         90     Microscopic Comment       SEE COMMENT  Comment:  Other formed elements not mentioned in the report are not   present in the microscopic  examination.          Mono #         0.4     Mono%         4.9     MPV         11.6     Nitrite, UA       Negative       Occult Blood UA       Negative       Opiate Scrn, Ur       Presumptive Positive       pH, UA       5.0       Platelets         108     POCT Glucose   84           Potassium         3.7     Preg Test, Ur     Negative         Total Protein         9.7     Protein, UA       1+  Comment:  Recommend a 24 hour urine protein or a urine   protein/creatinine ratio if globulin induced proteinuria is  clinically suspected.          Acceptable     Yes         RBC         3.77     RBC, UA       1       RDW         15.7     Sodium         134     Specific [place], UA       1.020       Specimen UA       Urine, Catheterized       Squam Epithel, UA       Few       Marijuana (THC) Metabolite       Negative       Toxicology Information       SEE COMMENT  Comment:  This screen includes the following classes of drugs at the   listed cut-off:  Benzodiazepines                  200 ng/ml  Methadone                        300 ng/ml  Cocaine metabolite               300 ng/ml  Opiates                          300 ng/ml  Barbiturates                     200 ng/ml  Amphetamines                    1000 ng/ml  Marijuana metabs (THC)            50 ng/ml  Phencyclidine (PCP)               25 ng/ml  High concentrations of Diphenhydramine may cross-react with  Phencyclidine PCP screening immunoassay giving a false   positive result.  High concentrations of Methylenedioxymethamphetamine (MDMA aka  Ectasy) and other structurally similar compounds may cross-   react with the Amphetamine/Methamphetamine screening   immunoassay giving a false positive result.  A metabolite of the anti-HIV drug Sustiva () may cause  false positive results in the Marijuana metabolite (THC)   screening assay.  Note: This exception list includes only more common   interferants in toxicology screen testing.  Because of many    cross-reactantspositive results on toxicology drug screens   should be confirmed whenever results do not correlate with   clinical presentation.  This report is intended for use in clinical monitoring and  management of patients. It is not intended for use in   employment related drug testing.  Because of any cross-reactants, positive results on toxicology  drug screens should be confirmed whenever results do not  correlate with clinical presentation.  Presumptive positive results are unconfirmed and may be used   only for medical purposes.         Troponin I         <0.006  Comment:  The reference interval for Troponin I represents the 99th percentile   cutoff   for our facility and is consistent with 3rd generation assay   performance.       Urobilinogen, UA       Negative       WBC, UA       4       WBC         8.86                          Significant Imaging: I have reviewed all pertinent imaging results/findings within the past 24 hours.

## 2019-01-19 NOTE — ED TRIAGE NOTES
"Pt arrived to the ED via EMS with c/o anxiety. Per EMS, pt  called and stated pt would not speak". On admit to the ED, pt is found alert but is non verbal. NAD noted. Dr. Workman is at bedside. Pt placed on cardiac monitoring, pulse ox and BP cuff. VSS.  "

## 2019-01-19 NOTE — ASSESSMENT & PLAN NOTE
Patient with baseline creatinine around 0.8 normally  Currently at 3.3  Dehydration likely component to acute renal failure  Recheck BMP daily  IV fluids with normal saline at 125 cc an hour continuous

## 2019-01-19 NOTE — HPI
"Patient is a 43 year old F with a past medical history of HIV/AIDS, medication non-compliance, crack-cocaine use, anxiety, history of KANDY that presents with feeling "sick" for several days.  Patient not able to elaborate more at time of examination.  She has had symptoms of nausea, vomiting, abdominal pain, cough with mild sputum production.  She denies any fevers, shortness of breath, diarrhea, constipation, dizziness, lightheadedness, vision changes.  Patient states that she ran out of all her home medications about a week ago and that she has not had a chance to refill them as of yet.  Patient also endorses using crack-cocaine 2 days ago and began feeling worse after this.  "

## 2019-01-19 NOTE — ASSESSMENT & PLAN NOTE
Given her history of HIV and medication noncompliance, will also treat for PCP pneumonia  Continue Rocephin, azithromycin, Bactrim  DuoNeb breathing treatments q.6 hours  Check sputum culture  Supplemental O2 as needed

## 2019-01-19 NOTE — ASSESSMENT & PLAN NOTE
Patient was continued crack cocaine use  Counseled extensively on crack cocaine cessation given her continued chronic medical issues.

## 2019-01-19 NOTE — H&P
"[facility] Medicine  History & Physical    Patient Name: Jayden Marcial  MRN: [id number]  Admission Date: 1/19/2019  Attending Physician: Tano Benjamin DO   Primary Care Provider: Teddy Kohler MD         Patient information was obtained from patient and ER records.     Subjective:     Principal Problem:<principal problem not specified>    Chief Complaint:   Chief Complaint   Patient presents with    Anxiety     Per EMS, call came out as anxiety attack with pt stating that she was out of her xanax. While beingg triaged pt will only say " Im sick"        HPI: Patient is a 43 year old F with a past medical history of HIV/AIDS, medication non-compliance, crack-cocaine use, anxiety, history of KANDY that presents with feeling "sick" for several days.  Patient not able to elaborate more at time of examination.  She has had symptoms of nausea, vomiting, abdominal pain, cough with mild sputum production.  She denies any fevers, shortness of breath, diarrhea, constipation, dizziness, lightheadedness, vision changes.  Patient states that she ran out of all her home medications about a week ago and that she has not had a chance to refill them as of yet.  Patient also endorses using crack-cocaine 2 days ago and began feeling worse after this.    Past Medical History:   Diagnosis Date    AIDS     Blind right eye     Cocaine use     HIV dementia     Malnutrition     Non compliance with medical treatment     Pancreatitis     TB (pulmonary tuberculosis)     Tobacco abuse        Past Surgical History:   Procedure Laterality Date    ABSCESS DRAINAGE      APPENDECTOMY      ASPIRATION ABSCESS FACE N/A 6/2/2015    Performed by Ramana Baeza MD at [facility] OR    BRONCHOSCOPY N/A 7/26/2016    Performed by Jamil Ryder MD at [facility] ENDO    CORNEAL TRANSPLANT         Review of patient's allergies indicates:  No Known Allergies    No current facility-administered medications on file prior to " encounter.      Current Outpatient Medications on File Prior to Encounter   Medication Sig    acyclovir (ZOVIRAX) 200 MG capsule Take 2 capsules (400 mg total) by mouth 3 (three) times daily.    busPIRone (BUSPAR) 10 MG tablet Take 10 mg by mouth 3 (three) times daily.    citalopram (CELEXA) 20 MG tablet Take 1 tablet (20 mg total) by mouth once daily.    cyproheptadine (,PERIACTIN,) 2 mg/5 mL syrup Take by mouth every 8 (eight) hours.    emtricitabine-tenofovir 200-300 mg (TRUVADA) 200-300 mg Tab Take 1 tablet by mouth once daily.    ergocalciferol (VITAMIN D2) 50,000 unit Cap Take 50,000 Units by mouth every 7 days.    ferrous sulfate (FEOSOL) 325 mg (65 mg iron) Tab tablet Take 325 mg by mouth daily with breakfast.    hydroxychloroquine (PLAQUENIL) 200 mg tablet Take 200 mg by mouth 2 (two) times daily.    hydrOXYzine HCl (ATARAX) 25 MG tablet Take 25 mg by mouth 3 (three) times daily.    lipase/protease/amylase (CREON ORAL) Take 6,000 capsules by mouth 2 (two) times daily.    montelukast (SINGULAIR) 10 mg tablet Take 10 mg by mouth every evening.    omeprazole (PRILOSEC) 20 MG capsule Take 20 mg by mouth once daily.    ondansetron (ZOFRAN) 8 MG tablet Take 8 mg by mouth every 8 (eight) hours.    azithromycin (ZITHROMAX) 500 MG tablet Take 1 tablet (500 mg total) by mouth once daily.    hydrocortisone 1 % cream Apply topically 2 (two) times daily.    ibuprofen (ADVIL,MOTRIN) 600 MG tablet Take 1 tablet (600 mg total) by mouth every 6 (six) hours as needed for Pain.    lactulose (CHRONULAC) 10 gram/15 mL solution Take by mouth 3 (three) times daily.    megestrol (MEGACE) 40 MG Tab Take 1 tablet (40 mg total) by mouth once daily.    methadone (DOLOPHINE) 10 mg/mL solution Take 10 mg by mouth 2 (two) times daily.    miconazole (MICOTIN) 2 % vaginal cream Place 1 applicator vaginally every evening.    nicotine (NICODERM CQ) 21 mg/24 hr Place 1 patch onto the skin once daily.     oxycodone-acetaminophen (PERCOCET) 2.5-325 mg per tablet Take 1 tablet by mouth every 8 (eight) hours as needed for Pain.    sulfamethoxazole-trimethoprim 800-160mg (BACTRIM DS) 800-160 mg Tab Take 1 tablet by mouth once daily.     Family History     Problem Relation (Age of Onset)    COPD Mother    Cancer Maternal Grandmother    Emphysema Mother    Hypertension Mother        Tobacco Use    Smoking status: Current Every Day Smoker     Packs/day: 0.50     Years: 20.00     Pack years: 10.00    Smokeless tobacco: Never Used   Substance and Sexual Activity    Alcohol use: Yes     Alcohol/week: 0.0 oz     Comment: per step father a 1/2 pint a day     Drug use: Yes     Frequency: 1.0 times per week     Types: Cocaine, Benzodiazepines, Marijuana     Comment: last used cocaine a few days ago    Sexual activity: Not on file     Review of Systems   Constitutional: Negative for chills and fever.   Respiratory: Positive for cough. Negative for shortness of breath.    Cardiovascular: Negative for chest pain.   Gastrointestinal: Positive for abdominal pain, nausea and vomiting. Negative for blood in stool, constipation and diarrhea.   Psychiatric/Behavioral: Positive for agitation.   All other systems reviewed and are negative.    Objective:     Vital Signs (Most Recent):  Temp: 98.2 °F (36.8 °C) (01/19/19 1104)  Pulse: 99 (01/19/19 1104)  Resp: 18 (01/19/19 1104)  BP: 111/64 (01/19/19 1104)  SpO2: 97 % (01/19/19 1104) Vital Signs (24h Range):  Temp:  [98.2 °F (36.8 °C)-98.6 °F (37 °C)] 98.2 °F (36.8 °C)  Pulse:  [84-99] 99  Resp:  [18-29] 18  SpO2:  [95 %-100 %] 97 %  BP: ()/(56-68) 111/64     Weight: 45.4 kg (100 lb)  Body mass index is 18.29 kg/m².    Physical Exam   Constitutional: Vital signs are normal. She has a sickly appearance. No distress.   HENT:   Head: Normocephalic and atraumatic.   Mouth/Throat: Mucous membranes are dry.   Mucosal ulcers noted   Eyes: Pupils are equal, round, and reactive to light.    Neck: No JVD present.   Cardiovascular: Normal rate and regular rhythm.   No murmur heard.  Pulmonary/Chest: Effort normal. She has no wheezes. She has rales in the right lower field.   Abdominal: Soft. Normal appearance and bowel sounds are normal. She exhibits no distension. There is no tenderness.   Neurological: She is alert.   Psychiatric: Judgment normal. Her mood appears anxious. She exhibits a depressed mood.         CRANIAL NERVES     CN III, IV, VI   Pupils are equal, round, and reactive to light.       Significant Labs:   Recent Lab Results       01/19/19  1000   01/19/19  0959   01/19/19  0900   01/19/19  0827   01/19/19  0742        Benzodiazepines       Negative       Methadone metabolites       Presumptive Positive       Phencyclidine       Negative       Albumin         3.4     Alcohol, Medical, Serum         <10     Alkaline Phosphatase         81     ALT         35     Amorphous, UA       Moderate       Amphetamine Screen, Ur       Negative       Anion Gap         13     Appearance, UA       Cloudy       AST         34     Bacteria, UA       Few       Barbiturate Screen, Ur       Negative       Baso #         0.01     Basophil%         0.1     Bilirubin (UA)       Negative       Total Bilirubin         0.5  Comment:  For infants and newborns, interpretation of results should be based  on gestational age, weight and in agreement with clinical  observations.  Premature Infant recommended reference ranges:  Up to 24 hours.............<8.0 mg/dL  Up to 48 hours............<12.0 mg/dL  3-5 days..................<15.0 mg/dL  6-29 days.................<15.0 mg/dL       BUN, Bld         24     Calcium         9.9     Chloride         99     CO2         22     Cocaine (Metab.)       Presumptive Positive       Color, UA       Yellow       Creatinine         3.3     Creatinine, Random Ur       79.3  Comment:  The random urine reference ranges provided were established   for 24 hour urine collections.  No  reference ranges exist for  random urine specimens.  Correlate clinically.         Differential Method         Automated     eGFR if          19     eGFR if non          16  Comment:  Calculation used to obtain the estimated glomerular filtration  rate (eGFR) is the CKD-EPI equation.        Eos #         0.1     Eosinophil%         1.2     Flu A & B Source Nasopharyngeal Swab             Glucose         69     Glucose, UA       Negative       Gran # (ANC)         7.7     Gran%         86.5     Granular Casts, UA       6       Hematocrit         33.8     Hemoglobin         11.4     Hyaline Casts, UA       15       Influenza A Ag, EIA Negative             Influenza B Ag, EIA Negative             Ketones, UA       Negative       Lactate, Bashir         2.3  Comment:  Falsely low lactic acid results can be found in samples   containing >=13.0 mg/dL total bilirubin and/or >=3.5 mg/dL   direct bilirubin.       LD         248  Comment:  Results are increased in hemolyzed samples.     Leukocytes, UA       Negative       Lymph #         0.7     Lymph%         7.3     MCH         30.2     MCHC         33.7     MCV         90     Microscopic Comment       SEE COMMENT  Comment:  Other formed elements not mentioned in the report are not   present in the microscopic examination.          Mono #         0.4     Mono%         4.9     MPV         11.6     Nitrite, UA       Negative       Occult Blood UA       Negative       Opiate Scrn, Ur       Presumptive Positive       pH, UA       5.0       Platelets         108     POCT Glucose   84           Potassium         3.7     Preg Test, Ur     Negative         Total Protein         9.7     Protein, UA       1+  Comment:  Recommend a 24 hour urine protein or a urine   protein/creatinine ratio if globulin induced proteinuria is  clinically suspected.          Acceptable     Yes         RBC         3.77     RBC, UA       1       RDW         15.7      Sodium         134     Specific [place], UA       1.020       Specimen UA       Urine, Catheterized       Squam Epithel, UA       Few       Marijuana (THC) Metabolite       Negative       Toxicology Information       SEE COMMENT  Comment:  This screen includes the following classes of drugs at the   listed cut-off:  Benzodiazepines                  200 ng/ml  Methadone                        300 ng/ml  Cocaine metabolite               300 ng/ml  Opiates                          300 ng/ml  Barbiturates                     200 ng/ml  Amphetamines                    1000 ng/ml  Marijuana metabs (THC)            50 ng/ml  Phencyclidine (PCP)               25 ng/ml  High concentrations of Diphenhydramine may cross-react with  Phencyclidine PCP screening immunoassay giving a false   positive result.  High concentrations of Methylenedioxymethamphetamine (MDMA aka  Ectasy) and other structurally similar compounds may cross-   react with the Amphetamine/Methamphetamine screening   immunoassay giving a false positive result.  A metabolite of the anti-HIV drug Sustiva () may cause  false positive results in the Marijuana metabolite (THC)   screening assay.  Note: This exception list includes only more common   interferants in toxicology screen testing.  Because of many   cross-reactantspositive results on toxicology drug screens   should be confirmed whenever results do not correlate with   clinical presentation.  This report is intended for use in clinical monitoring and  management of patients. It is not intended for use in   employment related drug testing.  Because of any cross-reactants, positive results on toxicology  drug screens should be confirmed whenever results do not  correlate with clinical presentation.  Presumptive positive results are unconfirmed and may be used   only for medical purposes.         Troponin I         <0.006  Comment:  The reference interval for Troponin I represents the 99th  percentile   cutoff   for our facility and is consistent with 3rd generation assay   performance.       Urobilinogen, UA       Negative       WBC, UA       4       WBC         8.86                          Significant Imaging: I have reviewed all pertinent imaging results/findings within the past 24 hours.    Assessment/Plan:     Thrombocytopenia    No signs of bleeding at this time  Will need to continue to monitor with daily labs.       Pneumonia due to infectious organism    Given her history of HIV and medication noncompliance, will also treat for PCP pneumonia  Continue Rocephin, azithromycin, Bactrim  DuoNeb breathing treatments q.6 hours  Check sputum culture  Supplemental O2 as needed       Cocaine abuse    Patient was continued crack cocaine use  Counseled extensively on crack cocaine cessation given her continued chronic medical issues.       AIDS    Admittedly non-compliant with medications at home  Will continue her Truvada here  Will check CD4 count as patient cannot tell me what her last CD4 count was and does not remember the name of her ID doctor         Tobacco abuse    Nicotine patch as needed.       Acute renal failure    Patient with baseline creatinine around 0.8 normally  Currently at 3.3  Dehydration likely component to acute renal failure  Recheck BMP daily  IV fluids with normal saline at 125 cc an hour continuous         VTE Risk Mitigation (From admission, onward)        Ordered     IP VTE LOW RISK PATIENT  Once      01/19/19 1118     Place JOHANN hose  Until discontinued      01/19/19 1118             Tano Benjamin DO  Department of Hospital Medicine   [facility]

## 2019-01-20 LAB
ANION GAP SERPL CALC-SCNC: 9 MMOL/L
BASOPHILS # BLD AUTO: 0.01 K/UL
BASOPHILS NFR BLD: 0.3 %
BUN SERPL-MCNC: 11 MG/DL
CALCIUM SERPL-MCNC: 8.9 MG/DL
CHLORIDE SERPL-SCNC: 107 MMOL/L
CO2 SERPL-SCNC: 20 MMOL/L
CREAT SERPL-MCNC: 1.2 MG/DL
DIFFERENTIAL METHOD: ABNORMAL
EOSINOPHIL # BLD AUTO: 0.1 K/UL
EOSINOPHIL NFR BLD: 3.7 %
ERYTHROCYTE [DISTWIDTH] IN BLOOD BY AUTOMATED COUNT: 15.7 %
EST. GFR  (AFRICAN AMERICAN): >60 ML/MIN/1.73 M^2
EST. GFR  (NON AFRICAN AMERICAN): 55 ML/MIN/1.73 M^2
GLUCOSE SERPL-MCNC: 75 MG/DL
HCT VFR BLD AUTO: 30.8 %
HGB BLD-MCNC: 10.3 G/DL
LYMPHOCYTES # BLD AUTO: 0.4 K/UL
LYMPHOCYTES NFR BLD: 11.6 %
MAGNESIUM SERPL-MCNC: 2.2 MG/DL
MCH RBC QN AUTO: 29.8 PG
MCHC RBC AUTO-ENTMCNC: 33.4 G/DL
MCV RBC AUTO: 89 FL
MONOCYTES # BLD AUTO: 0.4 K/UL
MONOCYTES NFR BLD: 12.5 %
NEUTROPHILS # BLD AUTO: 2.4 K/UL
NEUTROPHILS NFR BLD: 71.9 %
PHOSPHATE SERPL-MCNC: 2.2 MG/DL
PLATELET # BLD AUTO: 178 K/UL
PMV BLD AUTO: 11.2 FL
POTASSIUM SERPL-SCNC: 3.8 MMOL/L
RBC # BLD AUTO: 3.46 M/UL
SODIUM SERPL-SCNC: 136 MMOL/L
WBC # BLD AUTO: 3.28 K/UL

## 2019-01-20 PROCEDURE — 63600175 PHARM REV CODE 636 W HCPCS: Performed by: EMERGENCY MEDICINE

## 2019-01-20 PROCEDURE — S4991 NICOTINE PATCH NONLEGEND: HCPCS | Performed by: EMERGENCY MEDICINE

## 2019-01-20 PROCEDURE — 83735 ASSAY OF MAGNESIUM: CPT

## 2019-01-20 PROCEDURE — 25000003 PHARM REV CODE 250: Performed by: EMERGENCY MEDICINE

## 2019-01-20 PROCEDURE — 80048 BASIC METABOLIC PNL TOTAL CA: CPT

## 2019-01-20 PROCEDURE — 85025 COMPLETE CBC W/AUTO DIFF WBC: CPT

## 2019-01-20 PROCEDURE — 36415 COLL VENOUS BLD VENIPUNCTURE: CPT

## 2019-01-20 PROCEDURE — 25000003 PHARM REV CODE 250: Performed by: HOSPITALIST

## 2019-01-20 PROCEDURE — 63600175 PHARM REV CODE 636 W HCPCS

## 2019-01-20 PROCEDURE — S0166 INJ OLANZAPINE 2.5MG: HCPCS | Performed by: HOSPITALIST

## 2019-01-20 PROCEDURE — S0039 INJECTION, SULFAMETHOXAZOLE: HCPCS | Performed by: EMERGENCY MEDICINE

## 2019-01-20 PROCEDURE — 11000001 HC ACUTE MED/SURG PRIVATE ROOM

## 2019-01-20 PROCEDURE — 84100 ASSAY OF PHOSPHORUS: CPT

## 2019-01-20 PROCEDURE — 63600175 PHARM REV CODE 636 W HCPCS: Performed by: HOSPITALIST

## 2019-01-20 RX ORDER — HALOPERIDOL 5 MG/ML
INJECTION INTRAMUSCULAR
Status: COMPLETED
Start: 2019-01-20 | End: 2019-01-20

## 2019-01-20 RX ORDER — HALOPERIDOL 5 MG/ML
INJECTION INTRAMUSCULAR
Status: DISCONTINUED
Start: 2019-01-20 | End: 2019-01-20 | Stop reason: WASHOUT

## 2019-01-20 RX ORDER — HALOPERIDOL 5 MG/ML
5 INJECTION INTRAMUSCULAR ONCE
Status: DISCONTINUED | OUTPATIENT
Start: 2019-01-20 | End: 2019-01-24

## 2019-01-20 RX ORDER — OLANZAPINE 10 MG/2ML
5 INJECTION, POWDER, FOR SOLUTION INTRAMUSCULAR ONCE AS NEEDED
Status: COMPLETED | OUTPATIENT
Start: 2019-01-20 | End: 2019-01-20

## 2019-01-20 RX ORDER — HALOPERIDOL 5 MG/ML
5 INJECTION INTRAMUSCULAR ONCE AS NEEDED
Status: COMPLETED | OUTPATIENT
Start: 2019-01-20 | End: 2019-01-20

## 2019-01-20 RX ADMIN — CEFTRIAXONE 1 G: 1 INJECTION, SOLUTION INTRAVENOUS at 12:01

## 2019-01-20 RX ADMIN — OLANZAPINE 5 MG: 10 INJECTION, POWDER, FOR SOLUTION INTRAMUSCULAR at 03:01

## 2019-01-20 RX ADMIN — HYDROXYCHLOROQUINE SULFATE 200 MG: 200 TABLET, FILM COATED ORAL at 09:01

## 2019-01-20 RX ADMIN — PANTOPRAZOLE SODIUM 40 MG: 40 TABLET, DELAYED RELEASE ORAL at 11:01

## 2019-01-20 RX ADMIN — HYDROXYZINE HYDROCHLORIDE 25 MG: 25 TABLET, FILM COATED ORAL at 03:01

## 2019-01-20 RX ADMIN — CEFTRIAXONE 1 G: 1 INJECTION, SOLUTION INTRAVENOUS at 11:01

## 2019-01-20 RX ADMIN — METHADONE HYDROCHLORIDE 10 MG: 10 TABLET ORAL at 11:01

## 2019-01-20 RX ADMIN — HALOPERIDOL LACTATE 5 MG: 5 INJECTION, SOLUTION INTRAMUSCULAR at 03:01

## 2019-01-20 RX ADMIN — HYDROXYZINE HYDROCHLORIDE 25 MG: 25 TABLET, FILM COATED ORAL at 11:01

## 2019-01-20 RX ADMIN — SENNOSIDES AND DOCUSATE SODIUM 1 TABLET: 8.6; 5 TABLET ORAL at 09:01

## 2019-01-20 RX ADMIN — EMTRICITABINE AND TENOFOVIR DISOPROXIL FUMARATE 1 TABLET: 200; 300 TABLET, FILM COATED ORAL at 11:01

## 2019-01-20 RX ADMIN — PANCRELIPASE LIPASE, PANCRELIPASE PROTEASE, PANCRELIPASE AMYLASE 1 CAPSULE: 5000; 17000; 24000 CAPSULE, DELAYED RELEASE ORAL at 11:01

## 2019-01-20 RX ADMIN — ACYCLOVIR 400 MG: 200 CAPSULE ORAL at 09:01

## 2019-01-20 RX ADMIN — BUSPIRONE HYDROCHLORIDE 10 MG: 10 TABLET ORAL at 09:01

## 2019-01-20 RX ADMIN — SULFAMETHOXAZOLE AND TRIMETHOPRIM 303 MG: 80; 16 INJECTION, SOLUTION, CONCENTRATE INTRAVENOUS at 03:01

## 2019-01-20 RX ADMIN — BUSPIRONE HYDROCHLORIDE 10 MG: 10 TABLET ORAL at 11:01

## 2019-01-20 RX ADMIN — METHADONE HYDROCHLORIDE 10 MG: 10 TABLET ORAL at 09:01

## 2019-01-20 RX ADMIN — HYDROXYZINE HYDROCHLORIDE 25 MG: 25 TABLET, FILM COATED ORAL at 09:01

## 2019-01-20 RX ADMIN — NICOTINE 1 PATCH: 21 PATCH, EXTENDED RELEASE TRANSDERMAL at 09:01

## 2019-01-20 RX ADMIN — SULFAMETHOXAZOLE AND TRIMETHOPRIM 303 MG: 80; 16 INJECTION, SOLUTION, CONCENTRATE INTRAVENOUS at 05:01

## 2019-01-20 RX ADMIN — HYDROXYCHLOROQUINE SULFATE 200 MG: 200 TABLET, FILM COATED ORAL at 11:01

## 2019-01-20 RX ADMIN — MEGESTROL ACETATE 40 MG: 20 TABLET ORAL at 11:01

## 2019-01-20 RX ADMIN — ACYCLOVIR 400 MG: 200 CAPSULE ORAL at 11:01

## 2019-01-20 RX ADMIN — CITALOPRAM HYDROBROMIDE 20 MG: 20 TABLET ORAL at 11:01

## 2019-01-20 RX ADMIN — HALOPERIDOL LACTATE 5 MG: 5 INJECTION, SOLUTION INTRAMUSCULAR at 02:01

## 2019-01-20 RX ADMIN — SENNOSIDES AND DOCUSATE SODIUM 1 TABLET: 8.6; 5 TABLET ORAL at 11:01

## 2019-01-20 RX ADMIN — MONTELUKAST SODIUM 10 MG: 10 TABLET, FILM COATED ORAL at 09:01

## 2019-01-20 RX ADMIN — BUSPIRONE HYDROCHLORIDE 10 MG: 10 TABLET ORAL at 03:01

## 2019-01-20 RX ADMIN — ACYCLOVIR 400 MG: 200 CAPSULE ORAL at 03:01

## 2019-01-20 NOTE — PROGRESS NOTES
Doctor Ehsan aware of pt current behavior, HR, and request for sleeping aide. Doctor ordered Benadryl 50mg PO PRN nightly for Insomnia. Order read back and placed.

## 2019-01-20 NOTE — PROGRESS NOTES
Pt pulling up restraints. Pt repositioned. Restraints checked and tightened with 2 finger width between skin and restraints.

## 2019-01-20 NOTE — PROGRESS NOTES
Doctor informed of activity. Doctor ordered Haldol 5mg IM now. Ordered repeated and med administered.

## 2019-01-20 NOTE — PLAN OF CARE
Problem: Adult Inpatient Plan of Care  Goal: Plan of Care Review  Outcome: Ongoing (interventions implemented as appropriate)  VSS. Patient has remained calm and cooperative. Does complain of anxiety. Avoids talking to staff. Father at bedside. Restraints discontinued safely. Bed alarm maintained. Being transferred to [facility] and will have tele sitter

## 2019-01-20 NOTE — PROGRESS NOTES
Pt father Kwadwo Thorton called via pt request and for comfort/ safety. Pt current on phone with her father. Pt appears to be slightly calmer. Safety in place. Door open for pt safety and comfort

## 2019-01-20 NOTE — PROGRESS NOTES
biting on restraints and kicking leg over bed safety rails. Pt redirected and educated on safety. Bed alarms placed.

## 2019-01-20 NOTE — PROGRESS NOTES
and Ehsan doctor on unit. Pt assisted into wheelchair and rolled back to room. Pt safely assisted back to bed.

## 2019-01-20 NOTE — HOSPITAL COURSE
"Ms. Marcial was admitted with feeling "sick" in addition to underlying AIDS, non-compliance with meds and crack-cocaine use. She was placed in ICU due to uncontrollable behavior that required restraints and haldol injections. Her BP dropped briefly but did not require pressor support. Patient was calm, cooperative, out of restraints and stable for transfer to floor on 1/20. Of note, father at bedside said that patient was previously on hospice at [facility]. She was taken off hospice because they felt she was improving and patient decided to go back home to live with her father. He reported that she leaves the home and uses crack-cocaine. She missed last appt with PCP and did not get meds refilled. Pt was placed on empiric rocephin and azithromycin for pneumonia. Father feels dementia and psychosis worsening but also confounded by drug use. Pt with need of sitter for safety given constant need for redirection while on the floor. Psych and ID consulted. ID stopped azithromycin and added bactrim given her CD4 count of 22. There was also concern for cryptococcal meningitis (acute mental status change, skin lesions). Cryptococcal serum antigen has been ordered and high dose fluconazole for Cryptococcal coverage in the interim on 1/21. ID recommended LP with opening pressure to get crypto antigen csf, cell count, glucose, protein. Also screen for histoplasma, syphilis, and toxoplasma. IR consulted for LP.  LP performed on 1/24 with normal opening pressure.  All CSF labs either negative or pending.  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  Completed course of Rocephin for pneumonia during hospital stay.  Patient with advanced AIDS and poor overall prognosis.  She will be discharged with Diflucan for probable candida esophagitis.  Will also order Bactrim 3 times a week for PCP prophylaxis. Follow up with [facility] has been arranged.  Holding HAART until appointment.  Patient will be " discharged home.

## 2019-01-20 NOTE — PROGRESS NOTES
"[facility] Medicine  Progress Note    Patient Name: Jayden Marcial  MRN: [id number]  Patient Class: IP- Inpatient   Admission Date: 1/19/2019  Length of Stay: 1 days  Attending Physician: Stacie Corey MD  Primary Care Provider: Teddy Kohler MD        Subjective:     Principal Problem:Pneumonia due to infectious organism    HPI:  Patient is a 43 year old F with a past medical history of HIV/AIDS, medication non-compliance, crack-cocaine use, anxiety, history of KANDY that presents with feeling "sick" for several days.  Patient not able to elaborate more at time of examination.  She has had symptoms of nausea, vomiting, abdominal pain, cough with mild sputum production.  She denies any fevers, shortness of breath, diarrhea, constipation, dizziness, lightheadedness, vision changes.  Patient states that she ran out of all her home medications about a week ago and that she has not had a chance to refill them as of yet.  Patient also endorses using crack-cocaine 2 days ago and began feeling worse after this.    Hospital Course:  Pt admitted with feeling "sick" in addition to underlying AIDS, non-compliance with meds and crack-cocaine use. She was placed in ICU due to uncontrollable behavior requires restraints and haldol injections. Her BP dropped briefly but did not require pressor support. Patient is calm, cooperative, out of restraints and stable for transfer to floor.      Of note, father at bedside said that patient was previously on hospice at [facility]. She was taken off hospice because they felt she was improving and patient decided to go back home to live with father. He reports that she leaves the home and uses crack-cocaine. She missed last appt with PCP and did not get meds refilled. Currently on rocephin and azithromycin for pneumonia. Father feels dementia and psychosis worsening but also confounded by drug use. Will consult psych for further recs.     Interval History: pt barely " speaking and only states that she is sick     Review of Systems   Unable to perform ROS: Mental status change     Objective:     Vital Signs (Most Recent):  Temp: 98 °F (36.7 °C) (01/20/19 1130)  Pulse: 98 (01/20/19 1138)  Resp: (!) 23 (01/20/19 1138)  BP: 107/67 (01/20/19 1132)  SpO2: 100 % (01/20/19 1138) Vital Signs (24h Range):  Temp:  [97.5 °F (36.4 °C)-98.5 °F (36.9 °C)] 98 °F (36.7 °C)  Pulse:  [] 98  Resp:  [15-26] 23  SpO2:  [94 %-100 %] 100 %  BP: ()/(50-80) 107/67     Weight: 50 kg (110 lb 3.7 oz)  Body mass index is 20.83 kg/m².    Intake/Output Summary (Last 24 hours) at 1/20/2019 1201  Last data filed at 1/20/2019 1135  Gross per 24 hour   Intake 1009 ml   Output --   Net 1009 ml      Physical Exam   Constitutional: Vital signs are normal. She has a sickly appearance. No distress.   HENT:   Head: Normocephalic and atraumatic.   Mouth/Throat: Mucous membranes are dry.   Mucosal ulcers noted   Eyes: Pupils are equal, round, and reactive to light.   Neck: No JVD present.   Cardiovascular: Normal rate and regular rhythm.   No murmur heard.  Pulmonary/Chest: Effort normal. She has no wheezes. She has rales in the right lower field.   Abdominal: Soft. Normal appearance and bowel sounds are normal. She exhibits no distension. There is no tenderness.   Neurological: She is alert.   Psychiatric: Judgment normal. Her mood appears anxious. She exhibits a depressed mood.       Significant Labs:   ABGs: No results for input(s): PH, PCO2, HCO3, POCSATURATED, BE, TOTALHB, COHB, METHB, O2HB, POCFIO2 in the last 48 hours.  Blood Culture:   Recent Labs   Lab 01/19/19  0935 01/19/19  0950   LABBLOO No Growth to date  No Growth to date No Growth to date  No Growth to date     CBC:   Recent Labs   Lab 01/19/19  0742   WBC 8.86   HGB 11.4*   HCT 33.8*   *     CMP:   Recent Labs   Lab 01/19/19  0742   *   K 3.7   CL 99   CO2 22*   GLU 69*   BUN 24*   CREATININE 3.3*   CALCIUM 9.9   PROT 9.7*    ALBUMIN 3.4*   BILITOT 0.5   ALKPHOS 81   AST 34   ALT 35   ANIONGAP 13   EGFRNONAA 16*     Cardiac Markers: No results for input(s): CKMB, MYOGLOBIN, BNP, TROPISTAT in the last 48 hours.  Coagulation: No results for input(s): PT, INR, APTT in the last 48 hours.  Lactic Acid:   Recent Labs   Lab 01/19/19  0742   LACTATE 2.3*     Magnesium: No results for input(s): MG in the last 48 hours.  POCT Glucose:   Recent Labs   Lab 01/19/19  0959   POCTGLUCOSE 84     Troponin:   Recent Labs   Lab 01/19/19  0742   TROPONINI <0.006     TSH: No results for input(s): TSH in the last 4320 hours.  Urine Culture: No results for input(s): LABURIN in the last 48 hours.  Urine Studies:   Recent Labs   Lab 01/19/19  0827   COLORU Yellow   APPEARANCEUA Cloudy*   PHUR 5.0   SPECGRAV 1.020   PROTEINUA 1+*   GLUCUA Negative   KETONESU Negative   BILIRUBINUA Negative   OCCULTUA Negative   NITRITE Negative   UROBILINOGEN Negative   LEUKOCYTESUR Negative   RBCUA 1   WBCUA 4   BACTERIA Few*   SQUAMEPITHEL Few   HYALINECASTS 15*       Significant Imaging: I have reviewed all pertinent imaging results/findings within the past 24 hours.    Assessment/Plan:      * Pneumonia due to infectious organism    Given her history of HIV and medication noncompliance, will also treat for PCP pneumonia  Continue Rocephin, azithromycin, Bactrim  DuoNeb breathing treatments q.6 hours  Check sputum culture  Supplemental O2 as needed       Thrombocytopenia    No signs of bleeding at this time  Will need to continue to monitor with daily labs.       Cocaine abuse    Patient was continued crack cocaine use  Counseled extensively on crack cocaine cessation given her continued chronic medical issues.       AIDS    Admittedly non-compliant with medications at home  Will continue her Truvada here  Will check CD4 count as patient cannot tell me what her last CD4 count was and does not remember the name of her ID doctor         Tobacco abuse    Nicotine patch as  needed.       Acute renal failure    Patient with baseline creatinine around 0.8 normally  Currently at 3.3  Dehydration likely component to acute renal failure  Recheck BMP daily  IV fluids with normal saline at 125 cc an hour continuous         VTE Risk Mitigation (From admission, onward)        Ordered     IP VTE LOW RISK PATIENT  Once      01/19/19 1118     Place JOHANN hose  Until discontinued      01/19/19 1118          Critical care time spent on the evaluation and treatment of severe organ dysfunction, review of pertinent labs and imaging studies, discussions with consulting providers and discussions with patient/family: 40 minutes.    Stacie Corey MD  Department of Hospital Medicine   [facility]

## 2019-01-20 NOTE — PROGRESS NOTES
Pt has tremors. Getting up out of bed requesting pain meds and to leave to go by her father. Nursing staff safely redirect pt to bed. Bed alarm replaced, door open for safety and patient comfort. RN will inform MD and call pt father.

## 2019-01-20 NOTE — PROGRESS NOTES
Pt remain on phone with father. Pt much calmer and safe in bed. Pt continues to have tremors. Safety in place. Alarms active and audible. Door open via pt request/ comfort and safety.

## 2019-01-20 NOTE — PROGRESS NOTES
Pt very anxious crying asking for something to help her sleep. Pt and guest ( father) states pt has not slept in days. Pt currently has HR of 112. Pt has buspirone 10mg PO X3 daily ordered. Safety in place.

## 2019-01-20 NOTE — PROGRESS NOTES
Doctor inna informed of pt behavior. Haldol 5mg IV now and repeat in 1 hour if agitation continues. Order placed, repeated, and med administered.

## 2019-01-20 NOTE — PROGRESS NOTES
Pt out of restraints in doorway/ tyson. Security called. Pt attempt to grab at staff wrist and scratch during redirection. Pt started to stiffen legs and refuse to be placed in bed.

## 2019-01-20 NOTE — PROGRESS NOTES
Rn spoke with Mother, Morena Pack and informed mother of Soft wrist restraints and pt safety. Mother also educated on assessment, rounding, needs for DC and pt current status. Mother agrees for pt safety. Restraints placed. Safety in place.

## 2019-01-20 NOTE — PLAN OF CARE
01/20/19 0856   Discharge Assessment   Assessment Type Discharge Planning Assessment   Assessment information obtained from? Medical Record   Prior to hospitilization cognitive status: Unable to Assess   Prior to hospitalization functional status: Independent   Current cognitive status: Inappropriate Behavior   Current Functional Status: Independent   Facility Arrived From: home   Lives With parent(s)   Able to Return to Prior Arrangements (TBD)   Is patient able to care for self after discharge? Unable to determine at this time (comments)   Who are your caregiver(s) and their phone number(s)? [facility] [phone]   Patient's perception of discharge disposition other (comments)  (TBD)   Readmission Within the Last 30 Days no previous admission in last 30 days   Patient currently being followed by outpatient case management? No   Patient currently receives any other outside agency services? No   Equipment Currently Used at Home none   Do you have any problems affording any of your prescribed medications? TBD   Is the patient taking medications as prescribed? no   If no, which medications is patient not taking? (pt reported that she was out of all home medications for a week )   Does the patient have transportation home? Yes   Transportation Anticipated family or friend will provide   Discharge Plan A Home with family  (with follow up )   Discharge Plan B (TBD)   DME Needed Upon Discharge  none   Patient/Family in Agreement with Plan (plan TBD)     [facility] [place] [id number]  [place]VI[place] MULLER - [address]  [place] [address]  Phone: [phone] Fax: [phone]

## 2019-01-20 NOTE — SUBJECTIVE & OBJECTIVE
Interval History: pt barely speaking and only states that she is sick     Review of Systems   Unable to perform ROS: Mental status change     Objective:     Vital Signs (Most Recent):  Temp: 98 °F (36.7 °C) (01/20/19 1130)  Pulse: 98 (01/20/19 1138)  Resp: (!) 23 (01/20/19 1138)  BP: 107/67 (01/20/19 1132)  SpO2: 100 % (01/20/19 1138) Vital Signs (24h Range):  Temp:  [97.5 °F (36.4 °C)-98.5 °F (36.9 °C)] 98 °F (36.7 °C)  Pulse:  [] 98  Resp:  [15-26] 23  SpO2:  [94 %-100 %] 100 %  BP: ()/(50-80) 107/67     Weight: 50 kg (110 lb 3.7 oz)  Body mass index is 20.83 kg/m².    Intake/Output Summary (Last 24 hours) at 1/20/2019 1201  Last data filed at 1/20/2019 1135  Gross per 24 hour   Intake 1009 ml   Output --   Net 1009 ml      Physical Exam   Constitutional: Vital signs are normal. She has a sickly appearance. No distress.   HENT:   Head: Normocephalic and atraumatic.   Mouth/Throat: Mucous membranes are dry.   Mucosal ulcers noted   Eyes: Pupils are equal, round, and reactive to light.   Neck: No JVD present.   Cardiovascular: Normal rate and regular rhythm.   No murmur heard.  Pulmonary/Chest: Effort normal. She has no wheezes. She has rales in the right lower field.   Abdominal: Soft. Normal appearance and bowel sounds are normal. She exhibits no distension. There is no tenderness.   Neurological: She is alert.   Psychiatric: Judgment normal. Her mood appears anxious. She exhibits a depressed mood.       Significant Labs:   ABGs: No results for input(s): PH, PCO2, HCO3, POCSATURATED, BE, TOTALHB, COHB, METHB, O2HB, POCFIO2 in the last 48 hours.  Blood Culture:   Recent Labs   Lab 01/19/19  0935 01/19/19  0950   LABBLOO No Growth to date  No Growth to date No Growth to date  No Growth to date     CBC:   Recent Labs   Lab 01/19/19  0742   WBC 8.86   HGB 11.4*   HCT 33.8*   *     CMP:   Recent Labs   Lab 01/19/19  0742   *   K 3.7   CL 99   CO2 22*   GLU 69*   BUN 24*   CREATININE 3.3*    CALCIUM 9.9   PROT 9.7*   ALBUMIN 3.4*   BILITOT 0.5   ALKPHOS 81   AST 34   ALT 35   ANIONGAP 13   EGFRNONAA 16*     Cardiac Markers: No results for input(s): CKMB, MYOGLOBIN, BNP, TROPISTAT in the last 48 hours.  Coagulation: No results for input(s): PT, INR, APTT in the last 48 hours.  Lactic Acid:   Recent Labs   Lab 01/19/19  0742   LACTATE 2.3*     Magnesium: No results for input(s): MG in the last 48 hours.  POCT Glucose:   Recent Labs   Lab 01/19/19  0959   POCTGLUCOSE 84     Troponin:   Recent Labs   Lab 01/19/19  0742   TROPONINI <0.006     TSH: No results for input(s): TSH in the last 4320 hours.  Urine Culture: No results for input(s): LABURIN in the last 48 hours.  Urine Studies:   Recent Labs   Lab 01/19/19  0827   COLORU Yellow   APPEARANCEUA Cloudy*   PHUR 5.0   SPECGRAV 1.020   PROTEINUA 1+*   GLUCUA Negative   KETONESU Negative   BILIRUBINUA Negative   OCCULTUA Negative   NITRITE Negative   UROBILINOGEN Negative   LEUKOCYTESUR Negative   RBCUA 1   WBCUA 4   BACTERIA Few*   SQUAMEPITHEL Few   HYALINECASTS 15*       Significant Imaging: I have reviewed all pertinent imaging results/findings within the past 24 hours.

## 2019-01-20 NOTE — NURSING
Pt resting comfortably. On RA. NSR on the monitor. Restraints remain for pt safety. Pt updated on plan of care. Pt free of falls, injuries, and skin breakdown.

## 2019-01-20 NOTE — NURSING
0400 -  Pt received from Med-surg disoriented and on RA. Pt in 4 point restraints for pt safety and placed on cardiac monitor.

## 2019-01-20 NOTE — PROGRESS NOTES
Doctor Moser informed of pt acitivity. Four point restraint order placed. Order repeated and placed. Restraints placed in 4 point restraints. Safety maintained. Pt screaming and pulling up restraints.

## 2019-01-20 NOTE — PROGRESS NOTES
Pt bringing head down to wrist attempting to bite of restraints. Pt reeducated on safety and asked to stop activity. Pt stop.

## 2019-01-20 NOTE — PROGRESS NOTES
Pt out of bed in tyson, attempting to run up tyson, charge nurse attempt to redirect pt. Pt turn and moved hand towards charge but not hitting. Pt then try to scratch Arely FRANKEL when attempting to redirect to wheelchair. Pt refuse to sit and continue to swing hands. Security called.  called and Code White placed.

## 2019-01-20 NOTE — PROGRESS NOTES
Pt getting out of bed, attempting to fight staff. Squeezing staff arm during redirection to bed for pt safety. RN informed MD Ehsan. Doctor order soft wrist restraints for pt safety.

## 2019-01-21 PROBLEM — F19.10 POLYSUBSTANCE ABUSE: Status: ACTIVE | Noted: 2019-01-21

## 2019-01-21 LAB
CD3+CD4+ CELLS # BLD: 22 CELLS/UL (ref 300–1400)
CD3+CD4+ CELLS NFR BLD: 2.9 % (ref 28–57)

## 2019-01-21 PROCEDURE — S4991 NICOTINE PATCH NONLEGEND: HCPCS | Performed by: EMERGENCY MEDICINE

## 2019-01-21 PROCEDURE — 63600175 PHARM REV CODE 636 W HCPCS: Performed by: HOSPITALIST

## 2019-01-21 PROCEDURE — 99499 NO LOS: ICD-10-PCS | Mod: ,,, | Performed by: PHYSICIAN ASSISTANT

## 2019-01-21 PROCEDURE — 99499 UNLISTED E&M SERVICE: CPT | Mod: ,,, | Performed by: PHYSICIAN ASSISTANT

## 2019-01-21 PROCEDURE — 90792 PR PSYCHIATRIC DIAGNOSTIC EVALUATION W/MEDICAL SERVICES: ICD-10-PCS | Mod: ,,, | Performed by: NURSE PRACTITIONER

## 2019-01-21 PROCEDURE — 99233 SBSQ HOSP IP/OBS HIGH 50: CPT | Mod: ,,, | Performed by: INTERNAL MEDICINE

## 2019-01-21 PROCEDURE — 25000003 PHARM REV CODE 250: Performed by: HOSPITALIST

## 2019-01-21 PROCEDURE — S0039 INJECTION, SULFAMETHOXAZOLE: HCPCS | Performed by: EMERGENCY MEDICINE

## 2019-01-21 PROCEDURE — 25000003 PHARM REV CODE 250: Performed by: EMERGENCY MEDICINE

## 2019-01-21 PROCEDURE — S0166 INJ OLANZAPINE 2.5MG: HCPCS | Performed by: HOSPITALIST

## 2019-01-21 PROCEDURE — 11000001 HC ACUTE MED/SURG PRIVATE ROOM

## 2019-01-21 PROCEDURE — 99233 PR SUBSEQUENT HOSPITAL CARE,LEVL III: ICD-10-PCS | Mod: ,,, | Performed by: INTERNAL MEDICINE

## 2019-01-21 PROCEDURE — 63600175 PHARM REV CODE 636 W HCPCS: Performed by: EMERGENCY MEDICINE

## 2019-01-21 PROCEDURE — 90792 PSYCH DIAG EVAL W/MED SRVCS: CPT | Mod: ,,, | Performed by: NURSE PRACTITIONER

## 2019-01-21 RX ORDER — FLUCONAZOLE 2 MG/ML
800 INJECTION, SOLUTION INTRAVENOUS
Status: DISCONTINUED | OUTPATIENT
Start: 2019-01-21 | End: 2019-01-23

## 2019-01-21 RX ORDER — QUETIAPINE FUMARATE 25 MG/1
25 TABLET, FILM COATED ORAL NIGHTLY PRN
Status: DISCONTINUED | OUTPATIENT
Start: 2019-01-21 | End: 2019-01-27 | Stop reason: HOSPADM

## 2019-01-21 RX ORDER — OLANZAPINE 10 MG/2ML
10 INJECTION, POWDER, FOR SOLUTION INTRAMUSCULAR EVERY 8 HOURS PRN
Status: DISCONTINUED | OUTPATIENT
Start: 2019-01-21 | End: 2019-01-27 | Stop reason: HOSPADM

## 2019-01-21 RX ADMIN — BUSPIRONE HYDROCHLORIDE 10 MG: 10 TABLET ORAL at 09:01

## 2019-01-21 RX ADMIN — HYDROXYZINE HYDROCHLORIDE 25 MG: 25 TABLET, FILM COATED ORAL at 08:01

## 2019-01-21 RX ADMIN — PANCRELIPASE LIPASE, PANCRELIPASE PROTEASE, PANCRELIPASE AMYLASE 1 CAPSULE: 5000; 17000; 24000 CAPSULE, DELAYED RELEASE ORAL at 12:01

## 2019-01-21 RX ADMIN — HYDROXYZINE HYDROCHLORIDE 25 MG: 25 TABLET, FILM COATED ORAL at 05:01

## 2019-01-21 RX ADMIN — HYDROXYZINE HYDROCHLORIDE 25 MG: 25 TABLET, FILM COATED ORAL at 09:01

## 2019-01-21 RX ADMIN — EMTRICITABINE AND TENOFOVIR DISOPROXIL FUMARATE 1 TABLET: 200; 300 TABLET, FILM COATED ORAL at 08:01

## 2019-01-21 RX ADMIN — ACYCLOVIR 400 MG: 200 CAPSULE ORAL at 08:01

## 2019-01-21 RX ADMIN — SULFAMETHOXAZOLE AND TRIMETHOPRIM 335 MG: 80; 16 INJECTION, SOLUTION, CONCENTRATE INTRAVENOUS at 12:01

## 2019-01-21 RX ADMIN — ACYCLOVIR 400 MG: 200 CAPSULE ORAL at 09:01

## 2019-01-21 RX ADMIN — MEGESTROL ACETATE 40 MG: 20 TABLET ORAL at 08:01

## 2019-01-21 RX ADMIN — CEFTRIAXONE 1 G: 1 INJECTION, SOLUTION INTRAVENOUS at 12:01

## 2019-01-21 RX ADMIN — HYDROXYCHLOROQUINE SULFATE 200 MG: 200 TABLET, FILM COATED ORAL at 09:01

## 2019-01-21 RX ADMIN — PANTOPRAZOLE SODIUM 40 MG: 40 TABLET, DELAYED RELEASE ORAL at 08:01

## 2019-01-21 RX ADMIN — SULFAMETHOXAZOLE AND TRIMETHOPRIM 335 MG: 80; 16 INJECTION, SOLUTION, CONCENTRATE INTRAVENOUS at 08:01

## 2019-01-21 RX ADMIN — SENNOSIDES AND DOCUSATE SODIUM 1 TABLET: 8.6; 5 TABLET ORAL at 08:01

## 2019-01-21 RX ADMIN — BUSPIRONE HYDROCHLORIDE 10 MG: 10 TABLET ORAL at 08:01

## 2019-01-21 RX ADMIN — CITALOPRAM HYDROBROMIDE 20 MG: 20 TABLET ORAL at 08:01

## 2019-01-21 RX ADMIN — MONTELUKAST SODIUM 10 MG: 10 TABLET, FILM COATED ORAL at 09:01

## 2019-01-21 RX ADMIN — BUSPIRONE HYDROCHLORIDE 10 MG: 10 TABLET ORAL at 04:01

## 2019-01-21 RX ADMIN — OLANZAPINE 10 MG: 10 INJECTION, POWDER, FOR SOLUTION INTRAMUSCULAR at 04:01

## 2019-01-21 RX ADMIN — NICOTINE 1 PATCH: 21 PATCH, EXTENDED RELEASE TRANSDERMAL at 08:01

## 2019-01-21 RX ADMIN — FLUCONAZOLE, SODIUM CHLORIDE 800 MG: 2 INJECTION INTRAVENOUS at 06:01

## 2019-01-21 RX ADMIN — HYDROXYCHLOROQUINE SULFATE 200 MG: 200 TABLET, FILM COATED ORAL at 08:01

## 2019-01-21 RX ADMIN — SENNOSIDES AND DOCUSATE SODIUM 1 TABLET: 8.6; 5 TABLET ORAL at 09:01

## 2019-01-21 RX ADMIN — PANCRELIPASE LIPASE, PANCRELIPASE PROTEASE, PANCRELIPASE AMYLASE 1 CAPSULE: 5000; 17000; 24000 CAPSULE, DELAYED RELEASE ORAL at 08:01

## 2019-01-21 RX ADMIN — ACYCLOVIR 400 MG: 200 CAPSULE ORAL at 04:01

## 2019-01-21 RX ADMIN — PANCRELIPASE LIPASE, PANCRELIPASE PROTEASE, PANCRELIPASE AMYLASE 1 CAPSULE: 5000; 17000; 24000 CAPSULE, DELAYED RELEASE ORAL at 05:01

## 2019-01-21 RX ADMIN — METHADONE HYDROCHLORIDE 10 MG: 10 TABLET ORAL at 08:01

## 2019-01-21 RX ADMIN — SULFAMETHOXAZOLE AND TRIMETHOPRIM 335 MG: 80; 16 INJECTION, SOLUTION, CONCENTRATE INTRAVENOUS at 04:01

## 2019-01-21 NOTE — SUBJECTIVE & OBJECTIVE
Past Medical History:   Diagnosis Date    AIDS     Blind right eye     Cocaine use     HIV dementia     Malnutrition     Non compliance with medical treatment     Pancreatitis     TB (pulmonary tuberculosis)     Tobacco abuse        Past Surgical History:   Procedure Laterality Date    ABSCESS DRAINAGE      APPENDECTOMY      ASPIRATION ABSCESS FACE N/A 6/2/2015    [facility]    BRONCHOSCOPY N/A 7/26/2016    Performed by Jamil Ryder MD at [facility] ENDO    CORNEAL TRANSPLANT         Review of patient's allergies indicates:  No Known Allergies    Medications:  Medications Prior to Admission   Medication Sig    acyclovir (ZOVIRAX) 200 MG capsule Take 2 capsules (400 mg total) by mouth 3 (three) times daily.    busPIRone (BUSPAR) 10 MG tablet Take 10 mg by mouth 3 (three) times daily.    citalopram (CELEXA) 20 MG tablet Take 1 tablet (20 mg total) by mouth once daily.    cyproheptadine (,PERIACTIN,) 2 mg/5 mL syrup Take by mouth every 8 (eight) hours.    emtricitabine-tenofovir 200-300 mg (TRUVADA) 200-300 mg Tab Take 1 tablet by mouth once daily.    ergocalciferol (VITAMIN D2) 50,000 unit Cap Take 50,000 Units by mouth every 7 days.    ferrous sulfate (FEOSOL) 325 mg (65 mg iron) Tab tablet Take 325 mg by mouth daily with breakfast.    hydroxychloroquine (PLAQUENIL) 200 mg tablet Take 200 mg by mouth 2 (two) times daily.    hydrOXYzine HCl (ATARAX) 25 MG tablet Take 25 mg by mouth 3 (three) times daily.    lipase/protease/amylase (CREON ORAL) Take 6,000 capsules by mouth 2 (two) times daily.    montelukast (SINGULAIR) 10 mg tablet Take 10 mg by mouth every evening.    omeprazole (PRILOSEC) 20 MG capsule Take 20 mg by mouth once daily.    ondansetron (ZOFRAN) 8 MG tablet Take 8 mg by mouth every 8 (eight) hours.    azithromycin (ZITHROMAX) 500 MG tablet Take 1 tablet (500 mg total) by mouth once daily.    hydrocortisone 1 % cream Apply topically 2 (two) times  daily.    ibuprofen (ADVIL,MOTRIN) 600 MG tablet Take 1 tablet (600 mg total) by mouth every 6 (six) hours as needed for Pain.    lactulose (CHRONULAC) 10 gram/15 mL solution Take by mouth 3 (three) times daily.    megestrol (MEGACE) 40 MG Tab Take 1 tablet (40 mg total) by mouth once daily.    methadone (DOLOPHINE) 10 mg/mL solution Take 10 mg by mouth 2 (two) times daily.    miconazole (MICOTIN) 2 % vaginal cream Place 1 applicator vaginally every evening.    nicotine (NICODERM CQ) 21 mg/24 hr Place 1 patch onto the skin once daily.    oxycodone-acetaminophen (PERCOCET) 2.5-325 mg per tablet Take 1 tablet by mouth every 8 (eight) hours as needed for Pain.    sulfamethoxazole-trimethoprim 800-160mg (BACTRIM DS) 800-160 mg Tab Take 1 tablet by mouth once daily.     Antibiotics (From admission, onward)    Start     Stop Route Frequency Ordered    01/21/19 0000  sulfamethoxazole-trimethoprim 400-80 mg/5 mL (BACTRIM) 335 mg in dextrose 5 % 500 mL IVPB      -- IV Every 8 hours (non-standard times) 01/20/19 1536    01/19/19 1130  cefTRIAXone (ROCEPHIN) 1 g in dextrose 5 % 50 mL IVPB      -- IV Every 12 hours (non-standard times) 01/19/19 1118        Antifungals (From admission, onward)    None        Antivirals (From admission, onward)        Stop Route Frequency     acyclovir      -- Oral 3 times daily           Immunization History   Administered Date(s) Administered    Influenza - Quadrivalent - PF 11/30/2016    Pneumococcal Polysaccharide - 23 Valent 04/29/2013       Family History     Problem Relation (Age of Onset)    COPD Mother    Cancer Maternal Grandmother    Emphysema Mother    Hypertension Mother        Social History     Socioeconomic History    Marital status: Single     Spouse name: None    Number of children: None    Years of education: None    Highest education level: None   Social Needs    Financial resource strain: None    Food insecurity - worry: None    Food insecurity - inability:  None    Transportation needs - medical: None    Transportation needs - non-medical: None   Occupational History    None   Tobacco Use    Smoking status: Current Every Day Smoker     Packs/day: 0.50     Years: 20.00     Pack years: 10.00    Smokeless tobacco: Never Used   Substance and Sexual Activity    Alcohol use: Yes     Alcohol/week: 0.0 oz     Comment: per step father a 1/2 pint a day     Drug use: Yes     Frequency: 1.0 times per week     Types: Cocaine, Benzodiazepines, Marijuana     Comment: last used cocaine a few days ago    Sexual activity: None   Other Topics Concern    None   Social History Narrative    None     Review of Systems   Unable to perform ROS: Mental status change (nods head when asked about chest pain, SOB and mucosal ulcers/pain)     Objective:     Vital Signs (Most Recent):  Temp: 98.6 °F (37 °C) (01/21/19 1111)  Pulse: 100 (01/21/19 1111)  Resp: 18 (01/21/19 1111)  BP: 107/63 (01/21/19 1111)  SpO2: 95 % (01/21/19 1111) Vital Signs (24h Range):  Temp:  [97.9 °F (36.6 °C)-99 °F (37.2 °C)] 98.6 °F (37 °C)  Pulse:  [] 100  Resp:  [14-24] 18  SpO2:  [87 %-100 %] 95 %  BP: ()/(51-70) 107/63     Weight: 48.5 kg (106 lb 14.8 oz)  Body mass index is 20.2 kg/m².    Estimated Creatinine Clearance: 44.7 mL/min (based on SCr of 1.2 mg/dL).    Physical Exam   Constitutional: Vital signs are normal. She has a sickly appearance. No distress.   Thin appearing   HENT:   Head: Normocephalic and atraumatic.   Mouth/Throat: Mucous membranes are dry.   Limited mouth exam as patient unable to open her mouth wide enough to examine.   Cardiovascular: Normal rate and regular rhythm.   No murmur heard.  Pulmonary/Chest: Effort normal. She has no wheezes. She has rales in the right lower field.   Abdominal: Soft. Normal appearance and bowel sounds are normal. She exhibits no distension. There is no tenderness.   Musculoskeletal: She exhibits no edema.   Neurological: She is alert.   Non verbal,  Appears lethargic   Skin: Skin is warm and dry. No erythema.   Multiple macular skin lesions   Psychiatric: Judgment normal.       Significant Labs: All pertinent labs within the past 24 hours have been reviewed.    Significant Imaging: I have reviewed all pertinent imaging results/findings within the past 24 hours.

## 2019-01-21 NOTE — NURSING
Patient SPO2 87-88% in room air, she is awake, Not in any distress, pain free as claimed. Placed oxygen 2L via nasal cannula. SpO2 went up to 95-96% with oxygen. Dr. Moser notified.

## 2019-01-21 NOTE — PLAN OF CARE
Problem: Pain Acute  Goal: Optimal Pain Control  Outcome: Ongoing (interventions implemented as appropriate)  Intervention: Prevent or Manage Pain   01/21/19 0526   Prevent or Manage Pain   Sensory Stimulation Regulation quiet environment promoted   Sleep/Rest Enhancement regular sleep/rest pattern promoted;relaxation techniques promoted;awakenings minimized     Intervention: Optimize Psychosocial Wellbeing   01/21/19 0526   Prevent and Minimize Fear   Diversional Activities television   Promote Anxiety Reduction   Supportive Measures positive reinforcement provided;relaxation techniques promoted;verbalization of feelings encouraged

## 2019-01-21 NOTE — PLAN OF CARE
Problem: Fall Injury Risk  Goal: Absence of Fall and Fall-Related Injury  Outcome: Ongoing (interventions implemented as appropriate)  Intervention: Identify and Manage Contributors to Fall Injury Risk   01/21/19 0421   Manage Acute Allergic Reaction   Medication Review/Management medications reviewed   Identify and Manage Contributors to Fall Injury Risk   Self-Care Promotion BADL personal objects within reach;BADL personal routines maintained      01/21/19 0421   Manage Acute Allergic Reaction   Medication Review/Management medications reviewed   Identify and Manage Contributors to Fall Injury Risk   Self-Care Promotion BADL personal objects within reach;BADL personal routines maintained     Intervention: Promote Injury-Free Environment   01/21/19 0421   Optimize [place] and Functional Mobility   Environmental Safety Modification assistive device/personal items within reach;clutter free environment maintained;room near unit station   Optimize Balance and Safe Activity   Safety Promotion/Fall Prevention assistive device/personal item within reach;bed alarm set;family to remain at bedside;medications reviewed;nonskid shoes/socks when out of bed;side rails raised x 3;instructed to call staff for mobility

## 2019-01-21 NOTE — CONSULTS
[facility]  Infectious Disease  Consult Note    Patient Name: Jayden Marcial  MRN: [id number]  Admission Date: 1/19/2019  Hospital Length of Stay: 2 days  Attending Physician: Victorina Zacarias MD  Primary Care Provider: Teddy Kohler MD       Inpatient consult to Infectious Diseases  Consult performed by: Sultana Quezada PA-C  Consult ordered by: Victorina Zacarias MD          ID consult received. Chart being reviewed. Full consult note with recommendations to follow.    In the interim, please call with any questions.    Thank you,  Sultana Quezada PA-C  [phone]

## 2019-01-21 NOTE — SUBJECTIVE & OBJECTIVE
Patient History           Medical as of 1/21/2019     Past Medical History     Diagnosis Date Comments Source    AIDS -- -- Provider    Blind right eye -- -- Provider    Cocaine use -- -- Provider    HIV dementia -- -- Provider    Malnutrition -- -- Provider    Non compliance with medical treatment -- -- Provider    Pancreatitis -- -- Provider    Sleep difficulties -- -- Provider    TB (pulmonary tuberculosis) -- -- Provider    Tobacco abuse -- -- Provider                  Surgical as of 1/21/2019     Past Surgical History     Procedure Laterality Date Comments Source    APPENDECTOMY -- -- -- Provider    ABSCESS DRAINAGE -- -- -- Provider    CORNEAL TRANSPLANT -- -- -- Provider                  Family as of 1/21/2019     Problem Relation Name Age of Onset Comments Source    Hypertension Mother -- -- -- Provider    COPD Mother -- -- -- Provider    Emphysema Mother -- -- -- Provider    Cancer Maternal Grandmother -- -- -- Provider            Tobacco Use as of 1/21/2019     Smoking Status Smoking Start Date Smoking Quit Date Packs/Day Years Used    Current Every Day Smoker -- -- 0.50 20.00    Types Comments Smokeless Tobacco Status Smokeless Tobacco Quit Date Source    -- -- Never Used -- Provider            Alcohol Use as of 1/21/2019     Alcohol Use Drinks/Week Alcohol/Week Comments Source    Yes -- 0.0 oz per step father a 1/2 pint a day  Provider    Frequency Standard Drinks Binge Drinking Source      -- -- -- Provider             Drug Use as of 1/21/2019     Drug Use Types Frequency Comments Source    Yes  Cocaine, Benzodiazepines, Marijuana, Methamphetamines 1.0 last used cocaine a few days ago Provider            Sexual Activity as of 1/21/2019     Sexually Active Birth Control Partners Comments Source    Not Asked -- -- -- Provider            Activities of Daily Living as of 1/21/2019     Activities of Daily Living Question Response Comments Source    Patient feels they ought to cut down on drinking/drug  use Not Asked -- Provider    Patient annoyed by others criticizing their drinking/drug use Not Asked -- Provider    Patient has felt bad or guilty about drinking/drug use Not Asked -- Provider    Patient has had a drink/used drugs as an eye opener in the AM Not Asked -- Provider            Social Documentation as of 1/21/2019    None           Occupational as of 1/21/2019    None           Socioeconomic as of 1/21/2019     Marital Status Spouse Name Number of Children Years Education Education Level Preferred Language Ethnicity Race Source    Single -- -- -- -- English /Black Black or  Provider    Financial Resource Strain Food Insecurity: Worry Food Insecurity: Inability Transportation Needs: Medical Transportation Needs: Non-medical       -- -- -- -- --             Pertinent History     Question Response Comments    Lives with -- --    Place in Birth Order -- --    Lives in -- --    Number of Siblings -- --    Raised by -- --    Legal Involvement -- --    Childhood Trauma -- --    Criminal History of -- --    Financial Status -- --    Highest Level of Education -- --    Does patient have access to a firearm? -- --     Service -- --    Primary Leisure Activity -- --    Spirituality -- --        Past Medical History:   Diagnosis Date    AIDS     Blind right eye     Cocaine use     HIV dementia     Malnutrition     Non compliance with medical treatment     Pancreatitis     Sleep difficulties     TB (pulmonary tuberculosis)     Tobacco abuse      Past Surgical History:   Procedure Laterality Date    ABSCESS DRAINAGE      APPENDECTOMY      ASPIRATION ABSCESS FACE N/A 6/2/2015    Performed by [facility] at [facility] OR    BRONCHOSCOPY N/A 7/26/2016    Performed by Jamil Ryder MD at [facility] ENDO    CORNEAL TRANSPLANT       Family History     Problem Relation (Age of Onset)    COPD Mother    Cancer Maternal Grandmother    Emphysema Mother    Hypertension Mother         Tobacco Use    Smoking status: Current Every Day Smoker     Packs/day: 0.50     Years: 20.00     Pack years: 10.00    Smokeless tobacco: Never Used   Substance and Sexual Activity    Alcohol use: Yes     Alcohol/week: 0.0 oz     Comment: per step father a 1/2 pint a day     Drug use: Yes     Frequency: 1.0 times per week     Types: Cocaine, Benzodiazepines, Marijuana, Methamphetamines     Comment: last used cocaine a few days ago    Sexual activity: Not on file     Review of patient's allergies indicates:  No Known Allergies    No current facility-administered medications on file prior to encounter.      Current Outpatient Medications on File Prior to Encounter   Medication Sig    acyclovir (ZOVIRAX) 200 MG capsule Take 2 capsules (400 mg total) by mouth 3 (three) times daily.    busPIRone (BUSPAR) 10 MG tablet Take 10 mg by mouth 3 (three) times daily.    citalopram (CELEXA) 20 MG tablet Take 1 tablet (20 mg total) by mouth once daily.    cyproheptadine (,PERIACTIN,) 2 mg/5 mL syrup Take by mouth every 8 (eight) hours.    emtricitabine-tenofovir 200-300 mg (TRUVADA) 200-300 mg Tab Take 1 tablet by mouth once daily.    ergocalciferol (VITAMIN D2) 50,000 unit Cap Take 50,000 Units by mouth every 7 days.    ferrous sulfate (FEOSOL) 325 mg (65 mg iron) Tab tablet Take 325 mg by mouth daily with breakfast.    hydroxychloroquine (PLAQUENIL) 200 mg tablet Take 200 mg by mouth 2 (two) times daily.    hydrOXYzine HCl (ATARAX) 25 MG tablet Take 25 mg by mouth 3 (three) times daily.    lipase/protease/amylase (CREON ORAL) Take 6,000 capsules by mouth 2 (two) times daily.    montelukast (SINGULAIR) 10 mg tablet Take 10 mg by mouth every evening.    omeprazole (PRILOSEC) 20 MG capsule Take 20 mg by mouth once daily.    ondansetron (ZOFRAN) 8 MG tablet Take 8 mg by mouth every 8 (eight) hours.    azithromycin (ZITHROMAX) 500 MG tablet Take 1 tablet (500 mg total) by mouth once daily.    hydrocortisone  "1 % cream Apply topically 2 (two) times daily.    ibuprofen (ADVIL,MOTRIN) 600 MG tablet Take 1 tablet (600 mg total) by mouth every 6 (six) hours as needed for Pain.    lactulose (CHRONULAC) 10 gram/15 mL solution Take by mouth 3 (three) times daily.    megestrol (MEGACE) 40 MG Tab Take 1 tablet (40 mg total) by mouth once daily.    methadone (DOLOPHINE) 10 mg/mL solution Take 10 mg by mouth 2 (two) times daily.    miconazole (MICOTIN) 2 % vaginal cream Place 1 applicator vaginally every evening.    nicotine (NICODERM CQ) 21 mg/24 hr Place 1 patch onto the skin once daily.    oxycodone-acetaminophen (PERCOCET) 2.5-325 mg per tablet Take 1 tablet by mouth every 8 (eight) hours as needed for Pain.    sulfamethoxazole-trimethoprim 800-160mg (BACTRIM DS) 800-160 mg Tab Take 1 tablet by mouth once daily.     Psychotherapeutics (From admission, onward)    Start     Stop Route Frequency Ordered    01/20/19 0230  haloperidol lactate injection 5 mg      -- IM Once 01/20/19 0329    01/19/19 1500  busPIRone tablet 10 mg      -- Oral 3 times daily 01/19/19 1118    01/19/19 1130  citalopram tablet 20 mg      -- Oral Daily 01/19/19 1118        Review of Systems   Unable to perform ROS: Patient nonverbal     Strengths and Liabilities: Liability: Patient has poor health., Liability: Patient not speaking    Objective:     Vital Signs (Most Recent):  Temp: 98.6 °F (37 °C) (01/21/19 1111)  Pulse: 100 (01/21/19 1111)  Resp: 18 (01/21/19 1111)  BP: 107/63 (01/21/19 1111)  SpO2: 95 % (01/21/19 1111) Vital Signs (24h Range):  Temp:  [97.9 °F (36.6 °C)-99 °F (37.2 °C)] 98.6 °F (37 °C)  Pulse:  [] 100  Resp:  [14-24] 18  SpO2:  [87 %-100 %] 95 %  BP: ()/(51-70) 107/63     Height: 5' 1" (154.9 cm)  Weight: 48.5 kg (106 lb 14.8 oz)  Body mass index is 20.2 kg/m².      Intake/Output Summary (Last 24 hours) at 1/21/2019 1343  Last data filed at 1/21/2019 0821  Gross per 24 hour   Intake 870 ml   Output --   Net 870 ml "       Physical Exam   Psychiatric:   EXAMINATION    CONSTITUTIONAL  General Appearance: appears older than actual age    MUSCULOSKELETAL  Muscle Strength and Tone: unable to assess  Abnormal Involuntary Movements: none noted  Gait and Station: not observed    PSYCHIATRIC MENTAL STATUS EXAM   Level of Consciousness: awake and alert  Orientation: unable to assess  Grooming: wearing hospital attire  Psychomotor Behavior: no abnormalities noted  Speech: unable to assess  Language: unable to assess  Mood: unable to assess  Affect: unable to assess  Thought Process: unable to assess  Associations: unable to assess  Thought Content: unable to assess  Memory: unable to assess  Attention: unable to assess  Fund of Knowledge: unable to assess  Insight: unable to assess  Judgment: unable to assess          Significant Labs: All pertinent labs within the past 24 hours have been reviewed.    Significant Imaging: I have reviewed all pertinent imaging results/findings within the past 24 hours.

## 2019-01-21 NOTE — ASSESSMENT & PLAN NOTE
Her overall symptom complex includes: diagnosis of AIDS related dementia, mostly non-verbal, flat affect, combative behavior improved with Zyprexa, positive Methamphetamine, positive cocaine and positive opioid drug screenings, poor sleep, absolute CD4= 22, diagnosis of HIV/AIDS. There is no evidence that she is suicidal, homicidal or gravely disabled and therefore does not meet the criteria for a PEC.    Recommendation: Utilize Zyprexa 10 mg PO/IM q 8 hours PRN for agitation or uncontrollable threatening behavior. Utilize Seroquel 25 mg po qhs PRN sleep. Seroquel can also have a calming effect. Avoid addictive medications. Given her dementia diagnosis and current mental status, she is not an appropriate candidate for inpatient or outpatient drug rehabilitation at this time.

## 2019-01-21 NOTE — HPI
"Patient is a 43 year old Female with a past medical history of AIDS (most recently on outpatient Truvada), medication non-compliance, crack-cocaine use history of KANDY (incompletely treated) that presents with feeling "sick" for several days.  Patient not able to elaborate more at time of examination. Her step dad provides history. She has had symptoms of nausea, vomiting, abdominal pain, SOB, cough with mild sputum production. Her mental status has also declined. Symptoms started right after Juan C and he mentions patient drinking alcohol more frequently recently. Per chat review, patient ran out of all her home medications about a week ago and that she has not had a chance to refill them as of yet.    The patient was [facility] on [facility]. She was taken off hospice because they felt she was improving and patient decided to go back home to live with father. Most recently missed her PCP appt with Dr. Kohler. He prescribed her Truvada as an outpatient. Patient was started on rocephin and azithromycin for pneumonia. Now on Ceftriaxone and Bactrim. CD4 22, 2.9%. Flu, blood and urine cultures negative. Father feels dementia and psychosis worsening but also confounded by drug use. ID consulted for abx recs.        "

## 2019-01-21 NOTE — HPI
Jayden Marcial is a 42 y/o female with a PMHx of AIDS, Blind right eye, Cocaine use, HIV dementia, Malnutrition, Pancreatitis, TB, and Tobacco abuse. She presented to the ED via EMS for emergent evaluation of anxiety. A review of the chart indicates that  EMS reported that they received a call that Jayden was having an anxiety attack and was out of her Xanax. EMS reports that Jayden was non combative and not communicating much on their arrival.

## 2019-01-21 NOTE — NURSING
Patient is trying to get out from the bed. Redirected back to bed. Explained to her to call if she needs to go to the bathroom. Reorient the call light, placed within patient reach. Safety precautions maintained. Avasys at beside, bed alarm on. Will continue to monitor.

## 2019-01-21 NOTE — CONSULTS
[facility]  Psychiatry  Consult Note    Patient Name: Jayden Marcial  MRN: [id number]   Code Status: Full Code  Admission Date: 1/19/2019  Hospital Length of Stay: 2 days  Attending Physician: Victorina Zacarias MD  Primary Care Provider: Teddy Kohler MD    Current Legal Status: N/A    Patient information was obtained from patient, current medical records, Nurse Consuelo, Nurse Mateo and ER records.   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 There is no evidence that she is suicidal, homicidal or gravely disabled and therefore does not meet the criteria for a PEC.    Recommendation: Utilize Zyprexa 10 mg PO/IM q 8 hours PRN for agitation or uncontrollable threatening behavior. Utilize Seroquel 25 mg po qhs PRN sleep. Seroquel can also have a calming effect. If she becomes chronically combative, utilize Depakote 125 mg po TID.    Polysubstance abuse   Her overall symptom complex includes: diagnosis of AIDS related dementia, mostly non-verbal, flat affect, combative behavior improved with Zyprexa, positive Methamphetamine, positive cocaine and positive opioid drug screenings, poor sleep, absolute CD4= 22, diagnosis of HIV/AIDS. There is no evidence that she is suicidal, homicidal or gravely disabled and therefore does not meet the criteria for a PEC.    Recommendation: Utilize Zyprexa 10 mg PO/IM q 8 hours PRN for agitation or uncontrollable threatening behavior. Utilize Seroquel 25 mg po qhs PRN  sleep. Seroquel can also have a calming effect. Avoid addictive medications. Given her dementia diagnosis and current mental status, she is not an appropriate candidate for inpatient or outpatient drug rehabilitation at this time.                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 She presented to the ED via EMS for emergent evaluation of anxiety. A review of the chart indicates that  EMS reported that they received a call that Jayden was having an anxiety attack and was out of her Xanax. EMS reports that Jayden was non combative and not communicating much on their arrival.       Hospital Course: Jayden Marcial presents right side lying in bed with the head of bed up. She is wearing hospital attire. Her meal tray is at the bedside and she has not eaten any of her lunch. Nurse Cordero reports that she personally tried to feed her but she would not eat. Jayden does not speak and does not answer any questions verbally. Nurse Cordero reports that she is having problems with sleep as evidenced by her family reporting to staff that she had not slept for 2 days prior to coming into the hospital. Jayden minimally moves her head up and down when asked if she would like something for sleep. Nurse Cordero reports that Jayden had to be put in restraints and was sent to ICU yesterday due to her combative behavior. She was given Haldol to calm her down. Haldol was found to be ineffective. She was given Zyprexa with positive results. She was sent back to the floor and has not had any further incidence of combative behavior. Nurse Cordero and Mateo report that Jayden can be child-like at times and at one point, Jayden pointed to her antecubital area and stated  that she needed her medication and that this is where her medication goes.    Her overall symptom complex includes: diagnosis of AIDS related dementia, mostly non-verbal, flat affect, combative behavior improved with Zyprexa, positive Methamphetamine, positive cocaine and positive opioid drug screenings, poor sleep, absolute CD4=22, diagnosis of HIV/AIDS.         Patient History           Medical as of 1/21/2019     Past Medical History     Diagnosis Date Comments Source    AIDS -- -- Provider    Blind right eye -- -- Provider    Cocaine use -- -- Provider    HIV dementia -- -- Provider    Malnutrition -- -- Provider    Non compliance with medical treatment -- -- Provider    Pancreatitis -- -- Provider    Sleep difficulties -- -- Provider    TB (pulmonary tuberculosis) -- -- Provider    Tobacco abuse -- -- Provider                  Surgical as of 1/21/2019     Past Surgical History     Procedure Laterality Date Comments Source    APPENDECTOMY -- -- -- Provider    ABSCESS DRAINAGE -- -- -- Provider    CORNEAL TRANSPLANT -- -- -- Provider                  Family as of 1/21/2019     Problem Relation Name Age of Onset Comments Source    Hypertension Mother -- -- -- Provider    COPD Mother -- -- -- Provider    Emphysema Mother -- -- -- Provider    Cancer Maternal Grandmother -- -- -- Provider            Tobacco Use as of 1/21/2019     Smoking Status Smoking Start Date Smoking Quit Date Packs/Day Years Used    Current Every Day Smoker -- -- 0.50 20.00    Types Comments Smokeless Tobacco Status Smokeless Tobacco Quit Date Source    -- -- Never Used -- Provider            Alcohol Use as of 1/21/2019     Alcohol Use Drinks/Week Alcohol/Week Comments Source    Yes -- 0.0 oz per step father a 1/2 pint a day  Provider    Frequency Standard Drinks Binge Drinking Source      -- -- -- Provider             Drug Use as of 1/21/2019     Drug Use Types Frequency Comments Source    Yes  Cocaine, Benzodiazepines, Marijuana,  Methamphetamines 1.0 last used cocaine a few days ago Provider            Sexual Activity as of 1/21/2019     Sexually Active Birth Control Partners Comments Source    Not Asked -- -- -- Provider            Activities of Daily Living as of 1/21/2019     Activities of Daily Living Question Response Comments Source    Patient feels they ought to cut down on drinking/drug use Not Asked -- Provider    Patient annoyed by others criticizing their drinking/drug use Not Asked -- Provider    Patient has felt bad or guilty about drinking/drug use Not Asked -- Provider    Patient has had a drink/used drugs as an eye opener in the AM Not Asked -- Provider            Social Documentation as of 1/21/2019    None           Occupational as of 1/21/2019    None           Socioeconomic as of 1/21/2019     Marital Status Spouse Name Number of Children Years Education Education Level Preferred Language Ethnicity Race Source    Single -- -- -- -- English /Black Black or  Provider    Financial Resource Strain Food Insecurity: Worry Food Insecurity: Inability Transportation Needs: Medical Transportation Needs: Non-medical       -- -- -- -- --             Pertinent History     Question Response Comments    Lives with -- --    Place in Birth Order -- --    Lives in -- --    Number of Siblings -- --    Raised by -- --    Legal Involvement -- --    Childhood Trauma -- --    Criminal History of -- --    Financial Status -- --    Highest Level of Education -- --    Does patient have access to a firearm? -- --     Service -- --    Primary Leisure Activity -- --    Spirituality -- --        Past Medical History:   Diagnosis Date    AIDS     Blind right eye     Cocaine use     HIV dementia     Malnutrition     Non compliance with medical treatment     Pancreatitis     Sleep difficulties     TB (pulmonary tuberculosis)     Tobacco abuse      Past Surgical History:   Procedure Laterality Date     ABSCESS DRAINAGE      APPENDECTOMY      ASPIRATION ABSCESS FACE N/A 6/2/2015    [facility]    BRONCHOSCOPY N/A 7/26/2016    Performed by Jamil Ryder [facility] at [facility] ENDO    CORNEAL TRANSPLANT       Family History     Problem Relation (Age of Onset)    COPD Mother    Cancer Maternal Grandmother    Emphysema Mother    Hypertension Mother        Tobacco Use    Smoking status: Current Every Day Smoker     Packs/day: 0.50     Years: 20.00     Pack years: 10.00    Smokeless tobacco: Never Used   Substance and Sexual Activity    Alcohol use: Yes     Alcohol/week: 0.0 oz     Comment: per step father a 1/2 pint a day     Drug use: Yes     Frequency: 1.0 times per week     Types: Cocaine, Benzodiazepines, Marijuana, Methamphetamines     Comment: last used cocaine a few days ago    Sexual activity: Not on file     Review of patient's allergies indicates:  No Known Allergies    No current facility-administered medications on file prior to encounter.      Current Outpatient Medications on File Prior to Encounter   Medication Sig    acyclovir (ZOVIRAX) 200 MG capsule Take 2 capsules (400 mg total) by mouth 3 (three) times daily.    busPIRone (BUSPAR) 10 MG tablet Take 10 mg by mouth 3 (three) times daily.    citalopram (CELEXA) 20 MG tablet Take 1 tablet (20 mg total) by mouth once daily.    cyproheptadine (,PERIACTIN,) 2 mg/5 mL syrup Take by mouth every 8 (eight) hours.    emtricitabine-tenofovir 200-300 mg (TRUVADA) 200-300 mg Tab Take 1 tablet by mouth once daily.    ergocalciferol (VITAMIN D2) 50,000 unit Cap Take 50,000 Units by mouth every 7 days.    ferrous sulfate (FEOSOL) 325 mg (65 mg iron) Tab tablet Take 325 mg by mouth daily with breakfast.    hydroxychloroquine (PLAQUENIL) 200 mg tablet Take 200 mg by mouth 2 (two) times daily.    hydrOXYzine HCl (ATARAX) 25 MG tablet Take 25 mg by mouth 3 (three) times daily.    lipase/protease/amylase (CREON ORAL) Take 6,000  capsules by mouth 2 (two) times daily.    montelukast (SINGULAIR) 10 mg tablet Take 10 mg by mouth every evening.    omeprazole (PRILOSEC) 20 MG capsule Take 20 mg by mouth once daily.    ondansetron (ZOFRAN) 8 MG tablet Take 8 mg by mouth every 8 (eight) hours.    azithromycin (ZITHROMAX) 500 MG tablet Take 1 tablet (500 mg total) by mouth once daily.    hydrocortisone 1 % cream Apply topically 2 (two) times daily.    ibuprofen (ADVIL,MOTRIN) 600 MG tablet Take 1 tablet (600 mg total) by mouth every 6 (six) hours as needed for Pain.    lactulose (CHRONULAC) 10 gram/15 mL solution Take by mouth 3 (three) times daily.    megestrol (MEGACE) 40 MG Tab Take 1 tablet (40 mg total) by mouth once daily.    methadone (DOLOPHINE) 10 mg/mL solution Take 10 mg by mouth 2 (two) times daily.    miconazole (MICOTIN) 2 % vaginal cream Place 1 applicator vaginally every evening.    nicotine (NICODERM CQ) 21 mg/24 hr Place 1 patch onto the skin once daily.    oxycodone-acetaminophen (PERCOCET) 2.5-325 mg per tablet Take 1 tablet by mouth every 8 (eight) hours as needed for Pain.    sulfamethoxazole-trimethoprim 800-160mg (BACTRIM DS) 800-160 mg Tab Take 1 tablet by mouth once daily.     Psychotherapeutics (From admission, onward)    Start     Stop Route Frequency Ordered    01/20/19 0230  haloperidol lactate injection 5 mg      -- IM Once 01/20/19 0329    01/19/19 1500  busPIRone tablet 10 mg      -- Oral 3 times daily 01/19/19 1118    01/19/19 1130  citalopram tablet 20 mg      -- Oral Daily 01/19/19 1118        Review of Systems   Unable to perform ROS: Patient nonverbal     Strengths and Liabilities: Liability: Patient has poor health., Liability: Patient not speaking    Objective:     Vital Signs (Most Recent):  Temp: 98.6 °F (37 °C) (01/21/19 1111)  Pulse: 100 (01/21/19 1111)  Resp: 18 (01/21/19 1111)  BP: 107/63 (01/21/19 1111)  SpO2: 95 % (01/21/19 1111) Vital Signs (24h Range):  Temp:  [97.9 °F (36.6 °C)-99 °F  "(37.2 °C)] 98.6 °F (37 °C)  Pulse:  [] 100  Resp:  [14-24] 18  SpO2:  [87 %-100 %] 95 %  BP: ()/(51-70) 107/63     Height: 5' 1" (154.9 cm)  Weight: 48.5 kg (106 lb 14.8 oz)  Body mass index is 20.2 kg/m².      Intake/Output Summary (Last 24 hours) at 1/21/2019 1343  Last data filed at 1/21/2019 0821  Gross per 24 hour   Intake 870 ml   Output --   Net 870 ml       Physical Exam   Psychiatric:   EXAMINATION    CONSTITUTIONAL  General Appearance: appears older than actual age    MUSCULOSKELETAL  Muscle Strength and Tone: unable to assess  Abnormal Involuntary Movements: none noted  Gait and Station: not observed    PSYCHIATRIC MENTAL STATUS EXAM   Level of Consciousness: awake and alert  Orientation: unable to assess  Grooming: wearing hospital attire  Psychomotor Behavior: no abnormalities noted  Speech: unable to assess  Language: unable to assess  Mood: unable to assess  Affect: unable to assess  Thought Process: unable to assess  Associations: unable to assess  Thought Content: unable to assess  Memory: unable to assess  Attention: unable to assess  Fund of Knowledge: unable to assess  Insight: unable to assess  Judgment: unable to assess          Significant Labs: All pertinent labs within the past 24 hours have been reviewed.    Significant Imaging: I have reviewed all pertinent imaging results/findings within the past 24 hours.    Assessment/Plan:     Polysubstance abuse    Her overall symptom complex includes: diagnosis of AIDS related dementia, mostly non-verbal, flat affect, combative behavior improved with Zyprexa, positive Methamphetamine, positive cocaine and positive opioid drug screenings, poor sleep, absolute CD4= 22, diagnosis of HIV/AIDS. There is no evidence that she is suicidal, homicidal or gravely disabled and therefore does not meet the criteria for a PEC.    Recommendation: Utilize Zyprexa 10 mg PO/IM q 8 hours PRN for agitation or uncontrollable threatening behavior. Utilize " Seroquel 25 mg po qhs PRN sleep. Seroquel can also have a calming effect. Avoid addictive medications. Given her dementia diagnosis and current mental status, she is not an appropriate candidate for inpatient or outpatient drug rehabilitation at this time.     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 There is no evidence that she is suicidal, homicidal or gravely disabled and therefore does not meet the criteria for a PEC.    Recommendation: Utilize Zyprexa 10 mg PO/IM q 8 hours PRN for agitation or uncontrollable threatening behavior. Utilize Seroquel 25 mg po qhs PRN sleep. Seroquel can also have a calming effect. If she becomes chronically combative, utilize Depakote 125 mg po TID.              Total Time:  45 minutes     Travis Marion NP   Psychiatry  [facility]

## 2019-01-21 NOTE — ASSESSMENT & PLAN NOTE
Her overall symptom complex includes: diagnosis of AIDS related dementia, mostly non-verbal, flat affect, combative behavior improved with Zyprexa, positive Methamphetamine, positive cocaine and positive opioid drug screenings, poor sleep, absolute CD4= 22, diagnosis of HIV/AIDS. There is no evidence that she is suicidal, homicidal or gravely disabled and therefore does not meet the criteria for a PEC.    Recommendation: Utilize Zyprexa 10 mg PO/IM q 8 hours PRN for agitation or uncontrollable threatening behavior. Utilize Seroquel 25 mg po qhs PRN sleep. Seroquel can also have a calming effect. If she becomes chronically combative, utilize Depakote 125 mg po TID.

## 2019-01-22 PROBLEM — G93.40 ENCEPHALOPATHY ACUTE: Status: ACTIVE | Noted: 2019-01-22

## 2019-01-22 PROCEDURE — 25000003 PHARM REV CODE 250: Performed by: EMERGENCY MEDICINE

## 2019-01-22 PROCEDURE — S4991 NICOTINE PATCH NONLEGEND: HCPCS | Performed by: EMERGENCY MEDICINE

## 2019-01-22 PROCEDURE — 86403 PARTICLE AGGLUT ANTBDY SCRN: CPT

## 2019-01-22 PROCEDURE — 11000001 HC ACUTE MED/SURG PRIVATE ROOM

## 2019-01-22 PROCEDURE — 87385 HISTOPLASMA CAPSUL AG IA: CPT

## 2019-01-22 PROCEDURE — 87536 HIV-1 QUANT&REVRSE TRNSCRPJ: CPT

## 2019-01-22 PROCEDURE — 99233 SBSQ HOSP IP/OBS HIGH 50: CPT | Mod: ,,, | Performed by: PHYSICIAN ASSISTANT

## 2019-01-22 PROCEDURE — 63600175 PHARM REV CODE 636 W HCPCS: Performed by: HOSPITALIST

## 2019-01-22 PROCEDURE — 87449 NOS EACH ORGANISM AG IA: CPT

## 2019-01-22 PROCEDURE — 86592 SYPHILIS TEST NON-TREP QUAL: CPT

## 2019-01-22 PROCEDURE — 36415 COLL VENOUS BLD VENIPUNCTURE: CPT

## 2019-01-22 PROCEDURE — 86480 TB TEST CELL IMMUN MEASURE: CPT

## 2019-01-22 PROCEDURE — S0039 INJECTION, SULFAMETHOXAZOLE: HCPCS | Performed by: EMERGENCY MEDICINE

## 2019-01-22 PROCEDURE — 99233 PR SUBSEQUENT HOSPITAL CARE,LEVL III: ICD-10-PCS | Mod: ,,, | Performed by: PHYSICIAN ASSISTANT

## 2019-01-22 PROCEDURE — 86777 TOXOPLASMA ANTIBODY: CPT

## 2019-01-22 PROCEDURE — 86635 COCCIDIOIDES ANTIBODY: CPT

## 2019-01-22 PROCEDURE — 87901 NFCT AGT GNTYP ALYS HIV1 REV: CPT

## 2019-01-22 PROCEDURE — 63600175 PHARM REV CODE 636 W HCPCS: Performed by: EMERGENCY MEDICINE

## 2019-01-22 RX ADMIN — SULFAMETHOXAZOLE AND TRIMETHOPRIM 335 MG: 80; 16 INJECTION, SOLUTION, CONCENTRATE INTRAVENOUS at 09:01

## 2019-01-22 RX ADMIN — PANCRELIPASE LIPASE, PANCRELIPASE PROTEASE, PANCRELIPASE AMYLASE 1 CAPSULE: 5000; 17000; 24000 CAPSULE, DELAYED RELEASE ORAL at 04:01

## 2019-01-22 RX ADMIN — ACYCLOVIR 400 MG: 200 CAPSULE ORAL at 04:01

## 2019-01-22 RX ADMIN — BUSPIRONE HYDROCHLORIDE 10 MG: 10 TABLET ORAL at 04:01

## 2019-01-22 RX ADMIN — HYDROXYZINE HYDROCHLORIDE 25 MG: 25 TABLET, FILM COATED ORAL at 10:01

## 2019-01-22 RX ADMIN — FLUCONAZOLE, SODIUM CHLORIDE 800 MG: 2 INJECTION INTRAVENOUS at 04:01

## 2019-01-22 RX ADMIN — CEFTRIAXONE 1 G: 1 INJECTION, SOLUTION INTRAVENOUS at 11:01

## 2019-01-22 RX ADMIN — ACYCLOVIR 400 MG: 200 CAPSULE ORAL at 09:01

## 2019-01-22 RX ADMIN — PANCRELIPASE LIPASE, PANCRELIPASE PROTEASE, PANCRELIPASE AMYLASE 1 CAPSULE: 5000; 17000; 24000 CAPSULE, DELAYED RELEASE ORAL at 11:01

## 2019-01-22 RX ADMIN — BUSPIRONE HYDROCHLORIDE 10 MG: 10 TABLET ORAL at 10:01

## 2019-01-22 RX ADMIN — SULFAMETHOXAZOLE AND TRIMETHOPRIM 335 MG: 80; 16 INJECTION, SOLUTION, CONCENTRATE INTRAVENOUS at 05:01

## 2019-01-22 RX ADMIN — NICOTINE 1 PATCH: 21 PATCH, EXTENDED RELEASE TRANSDERMAL at 09:01

## 2019-01-22 RX ADMIN — SENNOSIDES AND DOCUSATE SODIUM 1 TABLET: 8.6; 5 TABLET ORAL at 10:01

## 2019-01-22 RX ADMIN — HYDROXYCHLOROQUINE SULFATE 200 MG: 200 TABLET, FILM COATED ORAL at 10:01

## 2019-01-22 RX ADMIN — CEFTRIAXONE 1 G: 1 INJECTION, SOLUTION INTRAVENOUS at 12:01

## 2019-01-22 RX ADMIN — ACYCLOVIR 400 MG: 200 CAPSULE ORAL at 10:01

## 2019-01-22 RX ADMIN — MEGESTROL ACETATE 40 MG: 20 TABLET ORAL at 09:01

## 2019-01-22 RX ADMIN — HYDROXYZINE HYDROCHLORIDE 25 MG: 25 TABLET, FILM COATED ORAL at 04:01

## 2019-01-22 RX ADMIN — BUSPIRONE HYDROCHLORIDE 10 MG: 10 TABLET ORAL at 09:01

## 2019-01-22 RX ADMIN — SENNOSIDES AND DOCUSATE SODIUM 1 TABLET: 8.6; 5 TABLET ORAL at 09:01

## 2019-01-22 RX ADMIN — MONTELUKAST SODIUM 10 MG: 10 TABLET, FILM COATED ORAL at 10:01

## 2019-01-22 RX ADMIN — PANTOPRAZOLE SODIUM 40 MG: 40 TABLET, DELAYED RELEASE ORAL at 09:01

## 2019-01-22 RX ADMIN — SULFAMETHOXAZOLE AND TRIMETHOPRIM 335 MG: 80; 16 INJECTION, SOLUTION, CONCENTRATE INTRAVENOUS at 12:01

## 2019-01-22 RX ADMIN — HYDROXYCHLOROQUINE SULFATE 200 MG: 200 TABLET, FILM COATED ORAL at 09:01

## 2019-01-22 RX ADMIN — CITALOPRAM HYDROBROMIDE 20 MG: 20 TABLET ORAL at 09:01

## 2019-01-22 RX ADMIN — ACETAMINOPHEN 650 MG: 325 TABLET ORAL at 10:01

## 2019-01-22 RX ADMIN — HYDROXYZINE HYDROCHLORIDE 25 MG: 25 TABLET, FILM COATED ORAL at 09:01

## 2019-01-22 RX ADMIN — PANCRELIPASE LIPASE, PANCRELIPASE PROTEASE, PANCRELIPASE AMYLASE 1 CAPSULE: 5000; 17000; 24000 CAPSULE, DELAYED RELEASE ORAL at 09:01

## 2019-01-22 NOTE — PROGRESS NOTES
[facility]  Infectious Disease  Progress Note    Patient Name: Jayden Marcial  MRN: [id number]  Admission Date: 1/19/2019  Length of Stay: 3 days  Attending Physician: Victorina Zacarias MD  Primary Care Provider: Teddy Kohler MD    Isolation Status: No active isolations  Assessment/Plan:      AIDS    43 year old female with AIDS (CD4 22/2.9%) admitted with acute worsening of mental status, chronic cough, and burning with swallow. Patient's father in law at bedside and is aware of AIDS diagnosis. Said that pt was in hospice, but this was reversed and is now living with him. She has apparently been having increasing paranoid behavior, but over past few days has become more quiet and nonverbal and confused. He said that she has been taking her HIV meds daily. [facility] with [facility] and she has been filling Truvada monotherapy as prescribed by Dr Kohler. Denies that she has outpatient ID physician, but is interested in establishing care at [facility] [place] [address].      Patient nonverbal to questioning. Awake. + cough. No hypoxia. + clonus. + flat round areas of darkened pigmentation diffusely, especially prominent on back. + dark spot on lower lip.      Work up: CT head, no acute findings. CXR mild interstitial edema vs pneumonia.      Given AIDS/low CD4, she could have any number of opportunistic infections. Primary problem is AMS. Concerned for possibility of cryptococcal meningitis (acute mental status change, skin lesions). Cryptococcal serum antigen has been ordered. Have started high dose fluconazole for Cryptococcal coverage in the interim. Needs LP with opening pressure, crypto antigen csf, cell count, glucose, protein. Also screen for histoplasma, syphilis, and toxoplasma. If toxo IgG positive, would consider MRI brain. If crypto serum antigen is positive or opening pressure significantly elevated, would d/c fluconazole and start treatment with Flucytosine and Ampho  "B.     Infiltrate on CXR could be fungal process (work up above) or known NTM. Not classic presentation of PCP and patient sating well on room air. Checking pcp smear and b-d glucan. Would also check AFB smear x3 and 2 M.TB PCR sputum for TB rule out (and to see if NTM continues to grow). Needs 3x/wk pcp ppx unless toxo igG positive, then would need Bactrim daily. No clear signs of bacterial pneumonia, resp gram stain/culture pending. Can continue ceftriaxone, but would avoid quinolones and macrolides during TB rule out. Check afb bcx for dMAC.     Patient's ultimate prognosis depends on HARRT. Would not start now given risk of IRIS, especially if she has cryptococcal meningits. Truvada should never be used as monotherapy in known HIV patients. Have sent genotype. Concern for M184v and k65R. Will coordinate f/u at [facility] per request.         Please call for any questions. Thank you.  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  Patient not able to elaborate more at time of examination. Her step dad provides history. She has had symptoms of nausea, vomiting, abdominal pain, SOB, cough with mild sputum production. Her mental status has also declined. Symptoms started right after Juan C and he mentions patient drinking alcohol more frequently recently. Per chat review, patient ran out of all her home medications about a week ago and that she has not had a chance to refill them as of yet.    The patient [facility]. She was taken off hospice because they felt she was improving and patient decided to go back home to live with father. Most recently missed " her PCP appt with Dr. Kohler. He prescribed her Truvada as an outpatient. Patient was started on rocephin and azithromycin for pneumonia. Now on Ceftriaxone and Bactrim. CD4 22, 2.9%. Flu, blood and urine cultures negative. Father feels dementia and psychosis worsening but also confounded by drug use. ID consulted for abx recs.        Interval History: Remains lethargic and non verbal. Infectious work up in process.    Review of Systems   Unable to perform ROS: Mental status change (nods head when asked about chest pain, SOB and mucosal ulcers/pain)     Objective:     Vital Signs (Most Recent):  Temp: 98.2 °F (36.8 °C) (01/22/19 1138)  Pulse: 96 (01/22/19 1138)  Resp: 17 (01/22/19 1138)  BP: 115/64 (01/22/19 1138)  SpO2: 97 % (01/22/19 1138) Vital Signs (24h Range):  Temp:  [98.2 °F (36.8 °C)-99.2 °F (37.3 °C)] 98.2 °F (36.8 °C)  Pulse:  [87-96] 96  Resp:  [17-18] 17  SpO2:  [90 %-97 %] 97 %  BP: (104-125)/(60-73) 115/64     Weight: 48.5 kg (106 lb 14.8 oz)  Body mass index is 20.2 kg/m².    Estimated Creatinine Clearance: 44.7 mL/min (based on SCr of 1.2 mg/dL).    Physical Exam   Constitutional: Vital signs are normal. She has a sickly appearance. No distress.   Thin appearing   HENT:   Head: Normocephalic and atraumatic.   Mouth/Throat: Mucous membranes are dry.   Limited mouth exam as patient unable to open her mouth wide enough to examine.   Cardiovascular: Normal rate and regular rhythm.   No murmur heard.  Pulmonary/Chest: Effort normal. She has no wheezes. She has rales in the right lower field.   Abdominal: Soft. Normal appearance and bowel sounds are normal. She exhibits no distension. There is no tenderness.   Musculoskeletal: She exhibits no edema.   Neurological: She is alert.   Non verbal to questioning. Appears lethargic. Clonus present.   Skin: Skin is warm and dry. No erythema.   Multiple darkened macular skin lesions prominently on back.   Psychiatric: Judgment normal.       Significant Labs: All  pertinent labs within the past 24 hours have been reviewed.    Significant Imaging: I have reviewed all pertinent imaging results/findings within the past 24 hours.

## 2019-01-22 NOTE — PROGRESS NOTES
"Pt complains of generalized pain "it hurts all over." Hard to elicit much info from her but she tells me "its getting bad." Medicated with tylenol earlier but continues calling nurse to room for "medicine that goes in here"  while pointing to her IV. Dr Zacarias notified, no new orders received at this time.   "

## 2019-01-22 NOTE — CONSULTS
IR consult for LP in pt with AMS.     Vascular surgery with several schedule cases in IR today with use of IR support staff and unsure of time of completion of these procedures at this time.     Unclear per notes if bedside LP has been attempted or if there are any limiting factors indicating need for image-guidance for LP.     If LP is deemed to be urgent/emergent, recommend bedside attempt by covering team as IR unsure of if/when we will have room and staff available to attempt procedure with image guidance. Will f/u when room/staff are available.    Atilio Mckeon MD  Interventional radiology

## 2019-01-22 NOTE — CONSULTS
[facility]  Infectious Disease  Consult Note    Patient Name: Jayden Marcial  MRN: [id number]  Admission Date: 1/19/2019  Hospital Length of Stay: 2 days  Attending Physician: Victorina Zacarias MD  Primary Care Provider: Teddy Kohler MD     Isolation Status: No active isolations    Patient information was obtained from relative(s) and past medical records.      Consults  Assessment/Plan:     AIDS    Apparently has been on outpatient Truvada alone. Hx of medical noncompliance. Absolute CD4 22, 2.9% this admission. Mild edema on CXR. No acute process seen on head CT. Currently on Bactrim for PJP prophylaxis and Ceftriaxone for possible PNA. Unable to assess oral cavity as patient cannot open her mouth wide enough for examination - though mucosal ulcers noted in chart (? HSV, candidal, CMV).    Plan/Recommendations  - Discontinue Truvada. Would not start ART until establishes care with an ID provider. Will contact [facility] to arrange an appointment for after discharge.   - In the interim, will start infectious work up given acute mental status changes though AIDS related dementia in mind.   - Have ordered: Resp culture, AFB sputum cultures x 3, MTB PCR (sputum), AFB blood cultures, HIV RNA PCR and genotyping, Fungitell, PCP smear, cryptococcal antigen, histoplasma antigen (urine), QG, RPR, Toxoplasma IgG.  - Continue PCP prophylaxis with Bactrim. OK to continue Ceftriaxone pending respiratory culture results.   - Recommend LP. Will start high dose fluconazole for Cryptococcal coverage pending LP results.  - ID will follow. Staff attestation to follow.         Thank you for the consult. Please call for any questions.  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  Patient not able to elaborate more at time of examination. Her step dad provides history. She has had symptoms of nausea, vomiting, abdominal pain, SOB, cough with mild sputum production. Her mental status has also declined. Symptoms started right after Juanc  and he mentions patient drinking alcohol more frequently recently. Per chat review, patient ran out of all her home medications about a week ago and that she has not had a chance to refill them as of yet.    The patient was [facility] hosp[facility]. She was taken off hospice because they felt she was improving and patient decided to go back home to live with father. Most recently missed her PCP appt with Dr. Kohler. He prescribed her Truvada as an outpatient. Patient was started on rocephin and azithromycin for pneumonia. Now on Ceftriaxone and Bactrim. CD4 22, 2.9%. Flu, blood and urine cultures negative. Father feels dementia and psychosis worsening but also confounded by drug use. ID consulted for abx recs.          Past Medical History:   Diagnosis Date    AIDS     Blind right eye     Cocaine use     HIV dementia     Malnutrition     Non compliance with medical treatment     Pancreatitis     TB (pulmonary tuberculosis)     Tobacco abuse        Past Surgical History:   Procedure Laterality Date    ABSCESS DRAINAGE      APPENDECTOMY      ASPIRATION ABSCESS FACE N/A 6/2/2015    Performed by Ramana Baeza MD at [facility] OR    BRONCHOSCOPY N/A 7/26/2016    Performed by Jamil Ryder MD at [facility] ENDO    CORNEAL TRANSPLANT         Review of patient's allergies indicates:  No Known Allergies    Medications:  Medications Prior to Admission   Medication Sig    acyclovir (ZOVIRAX) 200 MG capsule Take 2 capsules (400 mg total) by mouth 3 (three) times daily.    busPIRone (BUSPAR) 10 MG tablet Take 10 mg by mouth 3 (three) times daily.    " citalopram (CELEXA) 20 MG tablet Take 1 tablet (20 mg total) by mouth once daily.    cyproheptadine (,PERIACTIN,) 2 mg/5 mL syrup Take by mouth every 8 (eight) hours.    emtricitabine-tenofovir 200-300 mg (TRUVADA) 200-300 mg Tab Take 1 tablet by mouth once daily.    ergocalciferol (VITAMIN D2) 50,000 unit Cap Take 50,000 Units by mouth every 7 days.    ferrous sulfate (FEOSOL) 325 mg (65 mg iron) Tab tablet Take 325 mg by mouth daily with breakfast.    hydroxychloroquine (PLAQUENIL) 200 mg tablet Take 200 mg by mouth 2 (two) times daily.    hydrOXYzine HCl (ATARAX) 25 MG tablet Take 25 mg by mouth 3 (three) times daily.    lipase/protease/amylase (CREON ORAL) Take 6,000 capsules by mouth 2 (two) times daily.    montelukast (SINGULAIR) 10 mg tablet Take 10 mg by mouth every evening.    omeprazole (PRILOSEC) 20 MG capsule Take 20 mg by mouth once daily.    ondansetron (ZOFRAN) 8 MG tablet Take 8 mg by mouth every 8 (eight) hours.    azithromycin (ZITHROMAX) 500 MG tablet Take 1 tablet (500 mg total) by mouth once daily.    hydrocortisone 1 % cream Apply topically 2 (two) times daily.    ibuprofen (ADVIL,MOTRIN) 600 MG tablet Take 1 tablet (600 mg total) by mouth every 6 (six) hours as needed for Pain.    lactulose (CHRONULAC) 10 gram/15 mL solution Take by mouth 3 (three) times daily.    megestrol (MEGACE) 40 MG Tab Take 1 tablet (40 mg total) by mouth once daily.    methadone (DOLOPHINE) 10 mg/mL solution Take 10 mg by mouth 2 (two) times daily.    miconazole (MICOTIN) 2 % vaginal cream Place 1 applicator vaginally every evening.    nicotine (NICODERM CQ) 21 mg/24 hr Place 1 patch onto the skin once daily.    oxycodone-acetaminophen (PERCOCET) 2.5-325 mg per tablet Take 1 tablet by mouth every 8 (eight) hours as needed for Pain.    sulfamethoxazole-trimethoprim 800-160mg (BACTRIM DS) 800-160 mg Tab Take 1 tablet by mouth once daily.     Antibiotics (From admission, onward)    Start     Stop Route  Frequency Ordered    01/21/19 0000  sulfamethoxazole-trimethoprim 400-80 mg/5 mL (BACTRIM) 335 mg in dextrose 5 % 500 mL IVPB      -- IV Every 8 hours (non-standard times) 01/20/19 1536    01/19/19 1130  cefTRIAXone (ROCEPHIN) 1 g in dextrose 5 % 50 mL IVPB      -- IV Every 12 hours (non-standard times) 01/19/19 1118        Antifungals (From admission, onward)    None        Antivirals (From admission, onward)        Stop Route Frequency     acyclovir      -- Oral 3 times daily           Immunization History   Administered Date(s) Administered    Influenza - Quadrivalent - PF 11/30/2016    Pneumococcal Polysaccharide - 23 Valent 04/29/2013       Family History     Problem Relation (Age of Onset)    COPD Mother    Cancer Maternal Grandmother    Emphysema Mother    Hypertension Mother        Social History     Socioeconomic History    Marital status: Single     Spouse name: None    Number of children: None    Years of education: None    Highest education level: None   Social Needs    Financial resource strain: None    Food insecurity - worry: None    Food insecurity - inability: None    Transportation needs - medical: None    Transportation needs - non-medical: None   Occupational History    None   Tobacco Use    Smoking status: Current Every Day Smoker     Packs/day: 0.50     Years: 20.00     Pack years: 10.00    Smokeless tobacco: Never Used   Substance and Sexual Activity    Alcohol use: Yes     Alcohol/week: 0.0 oz     Comment: per step father a 1/2 pint a day     Drug use: Yes     Frequency: 1.0 times per week     Types: Cocaine, Benzodiazepines, Marijuana     Comment: last used cocaine a few days ago    Sexual activity: None   Other Topics Concern    None   Social History Narrative    None     Review of Systems   Unable to perform ROS: Mental status change (nods head when asked about chest pain, SOB and mucosal ulcers/pain)     Objective:     Vital Signs (Most Recent):  Temp: 98.6 °F (37 °C)  (01/21/19 1111)  Pulse: 100 (01/21/19 1111)  Resp: 18 (01/21/19 1111)  BP: 107/63 (01/21/19 1111)  SpO2: 95 % (01/21/19 1111) Vital Signs (24h Range):  Temp:  [97.9 °F (36.6 °C)-99 °F (37.2 °C)] 98.6 °F (37 °C)  Pulse:  [] 100  Resp:  [14-24] 18  SpO2:  [87 %-100 %] 95 %  BP: ()/(51-70) 107/63     Weight: 48.5 kg (106 lb 14.8 oz)  Body mass index is 20.2 kg/m².    Estimated Creatinine Clearance: 44.7 mL/min (based on SCr of 1.2 mg/dL).    Physical Exam   Constitutional: Vital signs are normal. She has a sickly appearance. No distress.   Thin appearing   HENT:   Head: Normocephalic and atraumatic.   Mouth/Throat: Mucous membranes are dry.   Limited mouth exam as patient unable to open her mouth wide enough to examine.   Cardiovascular: Normal rate and regular rhythm.   No murmur heard.  Pulmonary/Chest: Effort normal. She has no wheezes. She has rales in the right lower field.   Abdominal: Soft. Normal appearance and bowel sounds are normal. She exhibits no distension. There is no tenderness.   Musculoskeletal: She exhibits no edema.   Neurological: She is alert.   Non verbal, Appears lethargic   Skin: Skin is warm and dry. No erythema.   Multiple macular skin lesions   Psychiatric: Judgment normal.       Significant Labs: All pertinent labs within the past 24 hours have been reviewed.    Significant Imaging: I have reviewed all pertinent imaging results/findings within the past 24 hours.

## 2019-01-22 NOTE — SUBJECTIVE & OBJECTIVE
Interval History: Remains lethargic and non verbal. Infectious work up in process.    Review of Systems   Unable to perform ROS: Mental status change (nods head when asked about chest pain, SOB and mucosal ulcers/pain)     Objective:     Vital Signs (Most Recent):  Temp: 98.2 °F (36.8 °C) (01/22/19 1138)  Pulse: 96 (01/22/19 1138)  Resp: 17 (01/22/19 1138)  BP: 115/64 (01/22/19 1138)  SpO2: 97 % (01/22/19 1138) Vital Signs (24h Range):  Temp:  [98.2 °F (36.8 °C)-99.2 °F (37.3 °C)] 98.2 °F (36.8 °C)  Pulse:  [87-96] 96  Resp:  [17-18] 17  SpO2:  [90 %-97 %] 97 %  BP: (104-125)/(60-73) 115/64     Weight: 48.5 kg (106 lb 14.8 oz)  Body mass index is 20.2 kg/m².    Estimated Creatinine Clearance: 44.7 mL/min (based on SCr of 1.2 mg/dL).    Physical Exam   Constitutional: Vital signs are normal. She has a sickly appearance. No distress.   Thin appearing   HENT:   Head: Normocephalic and atraumatic.   Mouth/Throat: Mucous membranes are dry.   Limited mouth exam as patient unable to open her mouth wide enough to examine.   Cardiovascular: Normal rate and regular rhythm.   No murmur heard.  Pulmonary/Chest: Effort normal. She has no wheezes. She has rales in the right lower field.   Abdominal: Soft. Normal appearance and bowel sounds are normal. She exhibits no distension. There is no tenderness.   Musculoskeletal: She exhibits no edema.   Neurological: She is alert.   Non verbal to questioning. Appears lethargic. Clonus present.   Skin: Skin is warm and dry. No erythema.   Multiple darkened macular skin lesions prominently on back.   Psychiatric: Judgment normal.       Significant Labs: All pertinent labs within the past 24 hours have been reviewed.    Significant Imaging: I have reviewed all pertinent imaging results/findings within the past 24 hours.

## 2019-01-22 NOTE — PLAN OF CARE
Problem: Adult Inpatient Plan of Care  Goal: Plan of Care Review  Outcome: Outcome(s) achieved Date Met: 01/22/19 01/22/19 0610   Plan of Care Review   Plan of Care Reviewed With patient   Pt very lethargic. Slurred speech noted. Gait unsteady, encouraged to stay in bed. Bedpan use encouraged. Incontinence care given as needed. IV abx continued per orders. Pt has tremors throughout night. Verbalization of feelings encouraged. Telesitter continued. Call light within reach, bed alarm set. No distress noted.

## 2019-01-22 NOTE — ASSESSMENT & PLAN NOTE
Apparently has been on outpatient Truvada alone. Hx of medical noncompliance. Absolute CD4 22, 2.9% this admission. Mild edema on CXR. No acute process seen on head CT. Currently on Bactrim for PJP prophylaxis and Ceftriaxone for possible PNA. Unable to assess oral cavity as patient cannot open her mouth wide enough for examination - though mucosal ulcers noted in chart (? HSV, candidal, CMV).    Plan/Recommendations  - Discontinue Truvada. Would not start ART until establishes care with an ID provider. Will contact [facility] to arrange an appointment for after discharge.   - In the interim, will start infectious work up given acute mental status changes though AIDS related dementia in mind.   - Have ordered: Resp culture, AFB sputum cultures x 3, MTB PCR (sputum), AFB blood cultures, HIV RNA PCR and genotyping, Fungitell, PCP smear, cryptococcal antigen, histoplasma antigen (urine), QG, RPR, Toxoplasma IgG.  - Continue PCP prophylaxis with Bactrim. OK to continue Ceftriaxone pending respiratory culture results.   - Recommend LP. Will start high dose fluconazole for Cryptococcal coverage pending LP results.  - ID will follow. Staff attestation to follow.

## 2019-01-22 NOTE — ASSESSMENT & PLAN NOTE
43 year old female with AIDS (CD4 22/2.9%) admitted with acute worsening of mental status, chronic cough, and burning with swallow. Patient's father in law at bedside and is aware of AIDS diagnosis. Said that pt was in hospice, but this was reversed and is now living with him. She has apparently been having increasing paranoid behavior, but over past few days has become more quiet and nonverbal and confused. He said that she has been taking her HIV meds daily. Spoke with her [facility] pharmacy outpatient and she has been filling Truvada monotherapy as prescribed by Dr Kohler. Denies that she has outpatient ID physician, but is interested in establishing care at [facility] on [address].      Patient nonverbal to questioning. Awake. + cough. No hypoxia. + clonus. + flat round areas of darkened pigmentation diffusely, especially prominent on back. + dark spot on lower lip.      Work up: CT head, no acute findings. CXR mild interstitial edema vs pneumonia.      Given AIDS/low CD4, she could have any number of opportunistic infections. Primary problem is AMS. Concerned for possibility of cryptococcal meningitis (acute mental status change, skin lesions). Cryptococcal serum antigen has been ordered. Have started high dose fluconazole for Cryptococcal coverage in the interim. Needs LP with opening pressure, crypto antigen csf, cell count, glucose, protein. Also screen for histoplasma, syphilis, and toxoplasma. If toxo IgG positive, would consider MRI brain. If crypto serum antigen is positive or opening pressure significantly elevated, would d/c fluconazole and start treatment with Flucytosine and Ampho B.     Infiltrate on CXR could be fungal process (work up above) or known NTM. Not classic presentation of PCP and patient sating well on room air. Checking pcp smear and b-d glucan. Would also check AFB smear x3 and 2 M.TB PCR sputum for TB rule out (and to see if NTM continues to grow). Needs 3x/wk pcp ppx unless  toxo igG positive, then would need Bactrim daily. No clear signs of bacterial pneumonia, resp gram stain/culture pending. Can continue ceftriaxone, but would avoid quinolones and macrolides during TB rule out. Check afb bcx for dMAC.     Patient's ultimate prognosis depends on HARRT. Would not start now given risk of IRIS, especially if she has cryptococcal meningits. Truvada should never be used as monotherapy in known HIV patients. Have sent genotype. Concern for M184v and k65R. Will coordinate f/u at [facility] per request.

## 2019-01-23 LAB
ALBUMIN SERPL BCP-MCNC: 2.9 G/DL
ALP SERPL-CCNC: 74 U/L
ALT SERPL W/O P-5'-P-CCNC: 38 U/L
ANION GAP SERPL CALC-SCNC: 10 MMOL/L
AST SERPL-CCNC: 55 U/L
BASOPHILS # BLD AUTO: 0.01 K/UL
BASOPHILS NFR BLD: 0.3 %
BILIRUB SERPL-MCNC: 0.2 MG/DL
BUN SERPL-MCNC: 6 MG/DL
CALCIUM SERPL-MCNC: 9.2 MG/DL
CHLORIDE SERPL-SCNC: 103 MMOL/L
CO2 SERPL-SCNC: 21 MMOL/L
CREAT SERPL-MCNC: 1.3 MG/DL
CRYPTOC AG SER QL LA: NEGATIVE
DIFFERENTIAL METHOD: ABNORMAL
EOSINOPHIL # BLD AUTO: 0.1 K/UL
EOSINOPHIL NFR BLD: 2.5 %
ERYTHROCYTE [DISTWIDTH] IN BLOOD BY AUTOMATED COUNT: 15.8 %
EST. GFR  (AFRICAN AMERICAN): 57 ML/MIN/1.73 M^2
EST. GFR  (NON AFRICAN AMERICAN): 50 ML/MIN/1.73 M^2
GLUCOSE SERPL-MCNC: 76 MG/DL
HCT VFR BLD AUTO: 30.7 %
HGB BLD-MCNC: 10.7 G/DL
LYMPHOCYTES # BLD AUTO: 0.9 K/UL
LYMPHOCYTES NFR BLD: 29 %
M TB IFN-G CD4+ BCKGRND COR BLD-ACNC: 0 IU/ML
MAGNESIUM SERPL-MCNC: 2 MG/DL
MCH RBC QN AUTO: 30.3 PG
MCHC RBC AUTO-ENTMCNC: 34.9 G/DL
MCV RBC AUTO: 87 FL
MITOGEN IGNF BCKGRD COR BLD-ACNC: 0.27 IU/ML
MITOGEN IGNF BCKGRD COR BLD-ACNC: ABNORMAL [IU]/ML
MONOCYTES # BLD AUTO: 0.6 K/UL
MONOCYTES NFR BLD: 18.4 %
NEUTROPHILS # BLD AUTO: 1.6 K/UL
NEUTROPHILS NFR BLD: 49.8 %
NIL: 0.14 IU/ML
PHOSPHATE SERPL-MCNC: 4.1 MG/DL
PLATELET # BLD AUTO: 211 K/UL
PMV BLD AUTO: 10.8 FL
POTASSIUM SERPL-SCNC: 3.6 MMOL/L
PROT SERPL-MCNC: 8.6 G/DL
RBC # BLD AUTO: 3.53 M/UL
RPR SER QL: NORMAL
SODIUM SERPL-SCNC: 134 MMOL/L
T GONDII IGG SER QL IA: NORMAL
T GONDII IGG SERPL IA-ACNC: <5 IU/ML
TB2 - NIL: 0 IU/ML
WBC # BLD AUTO: 3.21 K/UL

## 2019-01-23 PROCEDURE — 25000003 PHARM REV CODE 250: Performed by: HOSPITALIST

## 2019-01-23 PROCEDURE — 25000003 PHARM REV CODE 250: Performed by: EMERGENCY MEDICINE

## 2019-01-23 PROCEDURE — 11000001 HC ACUTE MED/SURG PRIVATE ROOM

## 2019-01-23 PROCEDURE — 99233 SBSQ HOSP IP/OBS HIGH 50: CPT | Mod: ,,, | Performed by: INTERNAL MEDICINE

## 2019-01-23 PROCEDURE — 63600175 PHARM REV CODE 636 W HCPCS: Performed by: PHYSICIAN ASSISTANT

## 2019-01-23 PROCEDURE — 84100 ASSAY OF PHOSPHORUS: CPT

## 2019-01-23 PROCEDURE — 85025 COMPLETE CBC W/AUTO DIFF WBC: CPT

## 2019-01-23 PROCEDURE — S4991 NICOTINE PATCH NONLEGEND: HCPCS | Performed by: EMERGENCY MEDICINE

## 2019-01-23 PROCEDURE — S0039 INJECTION, SULFAMETHOXAZOLE: HCPCS | Performed by: EMERGENCY MEDICINE

## 2019-01-23 PROCEDURE — 99233 PR SUBSEQUENT HOSPITAL CARE,LEVL III: ICD-10-PCS | Mod: ,,, | Performed by: INTERNAL MEDICINE

## 2019-01-23 PROCEDURE — 36415 COLL VENOUS BLD VENIPUNCTURE: CPT

## 2019-01-23 PROCEDURE — 80053 COMPREHEN METABOLIC PANEL: CPT

## 2019-01-23 PROCEDURE — 63600175 PHARM REV CODE 636 W HCPCS: Performed by: EMERGENCY MEDICINE

## 2019-01-23 PROCEDURE — 83735 ASSAY OF MAGNESIUM: CPT

## 2019-01-23 RX ORDER — FLUCONAZOLE 2 MG/ML
400 INJECTION, SOLUTION INTRAVENOUS
Status: DISCONTINUED | OUTPATIENT
Start: 2019-01-23 | End: 2019-01-27 | Stop reason: HOSPADM

## 2019-01-23 RX ORDER — SULFAMETHOXAZOLE AND TRIMETHOPRIM 800; 160 MG/1; MG/1
1 TABLET ORAL DAILY
Status: DISCONTINUED | OUTPATIENT
Start: 2019-01-24 | End: 2019-01-27 | Stop reason: HOSPADM

## 2019-01-23 RX ADMIN — CITALOPRAM HYDROBROMIDE 20 MG: 20 TABLET ORAL at 09:01

## 2019-01-23 RX ADMIN — HYDROXYCHLOROQUINE SULFATE 200 MG: 200 TABLET, FILM COATED ORAL at 09:01

## 2019-01-23 RX ADMIN — SULFAMETHOXAZOLE AND TRIMETHOPRIM 335 MG: 80; 16 INJECTION, SOLUTION, CONCENTRATE INTRAVENOUS at 12:01

## 2019-01-23 RX ADMIN — ACYCLOVIR 400 MG: 200 CAPSULE ORAL at 02:01

## 2019-01-23 RX ADMIN — MEGESTROL ACETATE 40 MG: 20 TABLET ORAL at 09:01

## 2019-01-23 RX ADMIN — BUSPIRONE HYDROCHLORIDE 10 MG: 10 TABLET ORAL at 02:01

## 2019-01-23 RX ADMIN — ACYCLOVIR 400 MG: 200 CAPSULE ORAL at 09:01

## 2019-01-23 RX ADMIN — BUSPIRONE HYDROCHLORIDE 10 MG: 10 TABLET ORAL at 09:01

## 2019-01-23 RX ADMIN — HYDROXYZINE HYDROCHLORIDE 25 MG: 25 TABLET, FILM COATED ORAL at 09:01

## 2019-01-23 RX ADMIN — CEFTRIAXONE 1 G: 1 INJECTION, SOLUTION INTRAVENOUS at 02:01

## 2019-01-23 RX ADMIN — HYDROXYZINE HYDROCHLORIDE 25 MG: 25 TABLET, FILM COATED ORAL at 02:01

## 2019-01-23 RX ADMIN — PANTOPRAZOLE SODIUM 40 MG: 40 TABLET, DELAYED RELEASE ORAL at 09:01

## 2019-01-23 RX ADMIN — SENNOSIDES AND DOCUSATE SODIUM 1 TABLET: 8.6; 5 TABLET ORAL at 09:01

## 2019-01-23 RX ADMIN — CEFTRIAXONE 1 G: 1 INJECTION, SOLUTION INTRAVENOUS at 12:01

## 2019-01-23 RX ADMIN — PANCRELIPASE LIPASE, PANCRELIPASE PROTEASE, PANCRELIPASE AMYLASE 1 CAPSULE: 5000; 17000; 24000 CAPSULE, DELAYED RELEASE ORAL at 09:01

## 2019-01-23 RX ADMIN — SULFAMETHOXAZOLE AND TRIMETHOPRIM 335 MG: 80; 16 INJECTION, SOLUTION, CONCENTRATE INTRAVENOUS at 09:01

## 2019-01-23 RX ADMIN — PANCRELIPASE LIPASE, PANCRELIPASE PROTEASE, PANCRELIPASE AMYLASE 1 CAPSULE: 5000; 17000; 24000 CAPSULE, DELAYED RELEASE ORAL at 06:01

## 2019-01-23 RX ADMIN — PANCRELIPASE LIPASE, PANCRELIPASE PROTEASE, PANCRELIPASE AMYLASE 1 CAPSULE: 5000; 17000; 24000 CAPSULE, DELAYED RELEASE ORAL at 02:01

## 2019-01-23 RX ADMIN — FLUCONAZOLE, SODIUM CHLORIDE 400 MG: 2 INJECTION INTRAVENOUS at 06:01

## 2019-01-23 RX ADMIN — NICOTINE 1 PATCH: 21 PATCH, EXTENDED RELEASE TRANSDERMAL at 09:01

## 2019-01-23 RX ADMIN — DIPHENHYDRAMINE HYDROCHLORIDE 50 MG: 50 CAPSULE ORAL at 09:01

## 2019-01-23 RX ADMIN — QUETIAPINE FUMARATE 25 MG: 25 TABLET ORAL at 11:01

## 2019-01-23 RX ADMIN — CEFTRIAXONE 1 G: 1 INJECTION, SOLUTION INTRAVENOUS at 11:01

## 2019-01-23 RX ADMIN — MONTELUKAST SODIUM 10 MG: 10 TABLET, FILM COATED ORAL at 09:01

## 2019-01-23 NOTE — SUBJECTIVE & OBJECTIVE
Interval History: Pt with no acute events, reports she is scared today. Reassured her on plan of care. More calm and appropriate today.    Review of Systems   Unable to perform ROS: Mental status change     Objective:     Vital Signs (Most Recent):  Temp: 98.5 °F (36.9 °C) (01/23/19 0837)  Pulse: 101 (01/23/19 0837)  Resp: 17 (01/23/19 0837)  BP: 123/75 (01/23/19 0837)  SpO2: 97 % (01/23/19 0837) Vital Signs (24h Range):  Temp:  [98.2 °F (36.8 °C)-99 °F (37.2 °C)] 98.5 °F (36.9 °C)  Pulse:  [] 101  Resp:  [17-19] 17  SpO2:  [90 %-97 %] 97 %  BP: (115-128)/(64-81) 123/75     Weight: 48.5 kg (106 lb 14.8 oz)  Body mass index is 20.2 kg/m².    Intake/Output Summary (Last 24 hours) at 1/23/2019 1036  Last data filed at 1/23/2019 0907  Gross per 24 hour   Intake 240 ml   Output --   Net 240 ml      Physical Exam   Constitutional: Vital signs are normal. She appears well-developed. She has a sickly appearance. No distress.   Cachetic and chronically ill appearing, but appears better today   HENT:   Head: Normocephalic.   Mouth/Throat: Mucous membranes are dry.   Mucosal ulcerations   Cardiovascular: Normal rate and regular rhythm.   No murmur heard.  Pulmonary/Chest: Effort normal. She has no wheezes. She has rales in the right lower field.   Abdominal: Soft. Normal appearance and bowel sounds are normal. She exhibits no distension. There is no tenderness.   Musculoskeletal: She exhibits no edema.   Neurological: She is alert.   Answered an occasional simple question with yes/no, nods to questions, delayed and oriented x 1   Skin: Skin is warm and dry. Capillary refill takes less than 2 seconds. No erythema.   Multiple macular dark skin lesions noted on back   Psychiatric: Her mood appears anxious. Her speech is delayed. She is slowed. Cognition and memory are impaired. She is attentive.       Significant Labs: All pertinent labs within the past 24 hours have been reviewed.    Significant Imaging: I have reviewed  and interpreted all pertinent imaging results/findings within the past 24 hours.

## 2019-01-23 NOTE — ASSESSMENT & PLAN NOTE
Patient with baseline creatinine around 0.8 normally  On admit it was 3.3  Dehydration likely component to acute renal failure  IV fluids with normal saline at 125 cc an hour continuous  Resolved

## 2019-01-23 NOTE — SUBJECTIVE & OBJECTIVE
Interval History: Pt with no acute events, needs redirection and constant monitoring.    Review of Systems   Unable to perform ROS: Mental status change     Objective:     Vital Signs (Most Recent):  Temp: 99 °F (37.2 °C) (01/22/19 2309)  Pulse: 98 (01/22/19 2309)  Resp: 18 (01/22/19 2309)  BP: 125/76 (01/22/19 2309)  SpO2: (!) 90 % (01/22/19 2309) Vital Signs (24h Range):  Temp:  [98.2 °F (36.8 °C)-99.2 °F (37.3 °C)] 99 °F (37.2 °C)  Pulse:  [] 98  Resp:  [17-19] 18  SpO2:  [90 %-97 %] 90 %  BP: (104-128)/(64-80) 125/76     Weight: 48.5 kg (106 lb 14.8 oz)  Body mass index is 20.2 kg/m².    Intake/Output Summary (Last 24 hours) at 1/22/2019 2326  Last data filed at 1/22/2019 1702  Gross per 24 hour   Intake 360 ml   Output --   Net 360 ml      Physical Exam   Constitutional: Vital signs are normal. She appears well-developed. She has a sickly appearance. No distress.   Cachetic and chronically ill appearing   HENT:   Head: Normocephalic.   Mouth/Throat: Mucous membranes are dry.   Mucosal ulcerations   Cardiovascular: Normal rate and regular rhythm.   No murmur heard.  Pulmonary/Chest: Effort normal. She has no wheezes. She has rales in the right lower field.   Abdominal: Soft. Normal appearance and bowel sounds are normal. She exhibits no distension. There is no tenderness.   Musculoskeletal: She exhibits no edema.   Neurological: She is alert.   Answered an occasional simple question with yes/no, delayed and oriented x 1   Skin: Skin is warm and dry. Capillary refill takes less than 2 seconds. No erythema.   Multiple macular dark skin lesions noted on back   Psychiatric: Her speech is delayed. She is slowed. Cognition and memory are impaired. She expresses inappropriate judgment. She exhibits a depressed mood. She is noncommunicative. She is inattentive.       Significant Labs: All pertinent labs within the past 24 hours have been reviewed.    Significant Imaging: I have reviewed and interpreted all  pertinent imaging results/findings within the past 24 hours.

## 2019-01-23 NOTE — ASSESSMENT & PLAN NOTE
43 year old female with AIDS (CD4 22/2.9%) admitted with acute worsening of mental status, chronic cough, and burning with swallow. Patient's father in law at bedside and is aware of AIDS diagnosis. Said that pt was in hospice, but this was reversed and is now living with him. She has apparently been having increasing paranoid behavior, but over past few days has become more quiet and nonverbal and confused. He said that she has been taking her HIV meds daily. Spoke with her [facility] pharmacy outpatient and she has been filling Truvada monotherapy as prescribed by Dr Kohler. Denies that she has outpatient ID physician, but is interested in establishing care at [facility] on [address].      Patient initially nonverbal to questioning. However mentation improved today and she reports cough, neck stiffness, headache, blurry left sided vision, mouth pain and rash. No hypoxia. Clonus present along with round areas of darkened pigmentation diffusely, especially prominent on back.       Work up: CT head, no acute findings. CXR mild interstitial edema vs pneumonia.      Given AIDS/low CD4, she could have any number of opportunistic infections. Primary problem is AMS. Concerned for possibility of cryptococcal meningitis (acute mental status change, skin lesions). Needs LP with opening pressure, crypto antigen csf, cell count, glucose, protein. Have started high dose fluconazole for Cryptococcal coverage in the interim. Cryptococcal serum antigen is negative which is assuring. Also screen for histoplasma, syphilis, and toxoplasma. If toxo IgG positive, would consider MRI brain. If opening pressure significantly elevated, would d/c fluconazole and start treatment with Flucytosine and Ampho B.     Infiltrate on CXR could be fungal process (work up above) or known NTM. Not classic presentation of PCP and patient sating well on room air. Checking pcp smear and b-d glucan. Would also check AFB smear x3 and 2 M.TB PCR  sputum for TB rule out (and to see if NTM continues to grow). Needs 3x/wk pcp ppx unless toxo igG positive, then would need Bactrim daily. No clear signs of bacterial pneumonia, resp gram stain/culture pending. Can continue ceftriaxone, but would avoid quinolones and macrolides during TB rule out. Check afb bcx for dMAC.     Patient's ultimate prognosis depends on HARRT. Would not start now given risk of IRIS, especially if she has cryptococcal meningits. Truvada should never be used as monotherapy in known HIV patients. Have sent genotype. Concern for M184v and k65R. Will coordinate f/u at [facility] per request.

## 2019-01-23 NOTE — ASSESSMENT & PLAN NOTE
As discussed above  Unclear baseline but with reported change  Slowly improving and becoming more appropriate

## 2019-01-23 NOTE — PROGRESS NOTES
"[facility] Medicine  Progress Note    Patient Name: Jayden Marcial  MRN: [id number]  Patient Class: IP- Inpatient   Admission Date: 1/19/2019  Length of Stay: 3 days  Attending Physician: Victorina Zacarias MD  Primary Care Provider: Teddy Kohler MD        Subjective:     Principal Problem:AIDS    HPI:  Patient is a 43 year old F with a past medical history of HIV/AIDS, medication non-compliance, crack-cocaine use, anxiety, history of KANDY that presents with feeling "sick" for several days.  Patient not able to elaborate more at time of examination.  She has had symptoms of nausea, vomiting, abdominal pain, cough with mild sputum production.  She denies any fevers, shortness of breath, diarrhea, constipation, dizziness, lightheadedness, vision changes.  Patient states that she ran out of all her home medications about a week ago and that she has not had a chance to refill them as of yet.  Patient also endorses using crack-cocaine 2 days ago and began feeling worse after this.    Hospital Course:  Ms. Marcial was admitted with feeling "sick" in addition to underlying AIDS, non-compliance with meds and crack-cocaine use. She was placed in ICU due to uncontrollable behavior that required restraints and haldol injections. Her BP dropped briefly but did not require pressor support. Patient was calm, cooperative, out of restraints and stable for transfer to floor on 1/20. Of note, father at bedside said that patient was previously on hospice at [facility]. She was taken off hospice because they felt she was improving and patient decided to go back home to live with her father. He reported that she leaves the home and uses crack-cocaine. She missed last appt with PCP and did not get meds refilled. Pt was placed on empiric rocephin and azithromycin for pneumonia. Father feels dementia and psychosis worsening but also confounded by drug use. Pt with need of sitter for safety given constant need for redirection " while on the floor. Psych and ID consulted. ID stopped azithromycin and added bactrim given her CD4 count. There was also concern for cryptococcal meningitis (acute mental status change, skin lesions). Cryptococcal serum antigen has been ordered and high dose fluconazole for Cryptococcal coverage in the interim on 1/21..  ID recommended LP with opening pressure to get crypto antigen csf, cell count, glucose, protein. Also screen for histoplasma, syphilis, and toxoplasma. IR consulted for LP.    Interval History: Pt with no acute events, needs redirection and constant monitoring.    Review of Systems   Unable to perform ROS: Mental status change     Objective:     Vital Signs (Most Recent):  Temp: 99 °F (37.2 °C) (01/22/19 2309)  Pulse: 98 (01/22/19 2309)  Resp: 18 (01/22/19 2309)  BP: 125/76 (01/22/19 2309)  SpO2: (!) 90 % (01/22/19 2309) Vital Signs (24h Range):  Temp:  [98.2 °F (36.8 °C)-99.2 °F (37.3 °C)] 99 °F (37.2 °C)  Pulse:  [] 98  Resp:  [17-19] 18  SpO2:  [90 %-97 %] 90 %  BP: (104-128)/(64-80) 125/76     Weight: 48.5 kg (106 lb 14.8 oz)  Body mass index is 20.2 kg/m².    Intake/Output Summary (Last 24 hours) at 1/22/2019 2326  Last data filed at 1/22/2019 1702  Gross per 24 hour   Intake 360 ml   Output --   Net 360 ml      Physical Exam   Constitutional: Vital signs are normal. She appears well-developed. She has a sickly appearance. No distress.   Cachetic and chronically ill appearing   HENT:   Head: Normocephalic.   Mouth/Throat: Mucous membranes are dry.   Mucosal ulcerations   Cardiovascular: Normal rate and regular rhythm.   No murmur heard.  Pulmonary/Chest: Effort normal. She has no wheezes. She has rales in the right lower field.   Abdominal: Soft. Normal appearance and bowel sounds are normal. She exhibits no distension. There is no tenderness.   Musculoskeletal: She exhibits no edema.   Neurological: She is alert.   Answered an occasional simple question with yes/no, delayed and oriented  x 1   Skin: Skin is warm and dry. Capillary refill takes less than 2 seconds. No erythema.   Multiple macular dark skin lesions noted on back   Psychiatric: Her speech is delayed. She is slowed. Cognition and memory are impaired. She expresses inappropriate judgment. She exhibits a depressed mood. She is noncommunicative. She is inattentive.       Significant Labs: All pertinent labs within the past 24 hours have been reviewed.    Significant Imaging: I have reviewed and interpreted all pertinent imaging results/findings within the past 24 hours.    Assessment/Plan:      * AIDS    Admittedly non-compliant with medications at home  Truvada stopped and unknown ID physician in the outpatient setting  CD4 count 22  ID consult appreciated  Continued rocephin and stopped azithromycin  On IV bactrim and high dose fluconazole  Possible cryptococcal meningitis as cause for encephalopathy (acute mental status change, skin lesions).    Cryptococcal serum antigen pending  IR consulted for LP with opening pressure  Will need crypto antigen CSF, cell count, glucose, protein.  Also screen for histoplasma, syphilis, and toxoplasma.     Encephalopathy acute    As discussed above  Unclear baseline but with reported change       Pneumonia due to infectious organism    Given her history of HIV and medication noncompliance  Continue to treat for possible PCP pneumonia  Continue Rocephin, Bactrim  DuoNeb breathing treatments q.6 hours  Check sputum culture  Supplemental O2 as needed  AFB smears in progress  Appreciate ID input     Polysubstance abuse    Noted and reported by family  Mentation precludes counseling at this time  Will address when patient is more appropriate     Thrombocytopenia    No signs of bleeding at this time  Normalized platelets  Will monitor     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 Corey     Tobacco abuse    Nicotine patch as needed.  Pt is not mentally appropriate for counseling     Acute renal failure    Patient with baseline creatinine around 0.8 normally  On admit it was 3.3  Dehydration likely component to acute renal failure  IV fluids with normal saline at 125 cc an hour continuous  Resolved       VTE Risk Mitigation (From admission, onward)        Ordered     IP VTE LOW RISK PATIENT  Once      01/19/19 1118     Place JOHANN hose  Until discontinued      01/19/19 1118              Victorina Zacarias MD  Department of Hospital Medicine   [facility]   77

## 2019-01-23 NOTE — PROGRESS NOTES
SW attempted to meet with patient to discuss discharge plan and if someone available at home to take care of her. Patient unable to engage with SW, extremely lethargic and confused. SW will have to reach out to family.

## 2019-01-23 NOTE — ASSESSMENT & PLAN NOTE
Patient was continued crack cocaine use  Counseled extensively on crack cocaine cessation given her continued chronic medical issues done by Dr. Corey

## 2019-01-23 NOTE — CONSULTS
"Inpatient Radiology Pre-procedure Note    History of Present Illness:  Jayden Marcial is a 43 y.o. female with PMHx of HIV/AIDS, medication non-compliance, crack-cocaine use and anxiety that presents with feeling "sick" with AMS for several days associated with nausea, vomiting, abdominal pain and cough with mild sputum production.  She denies any fevers, shortness of breath, diarrhea, constipation, dizziness, lightheadedness, vision changes.  Patient states that she ran out of all her home medications about a week ago and that she has not had a chance to refill them as of yet.     Given h/o AIDS with low CD4, current AMS there is concern for CNS infection and new inpatient IR consult was placed for diagnostic LP. Of note, pt was in ICU 2/2 uncontrollable behavior requiring restraints and haldol injections.       Admission H&P reviewed.  Past Medical History:   Diagnosis Date    AIDS     Blind right eye     Cocaine use     HIV dementia     Malnutrition     Non compliance with medical treatment     Pancreatitis     Sleep difficulties     TB (pulmonary tuberculosis)     Tobacco abuse      Past Surgical History:   Procedure Laterality Date    ABSCESS DRAINAGE      APPENDECTOMY      ASPIRATION ABSCESS FACE N/A 6/2/2015    [facility]    BRONCHOSCOPY N/A 7/26/2016    Performed by Jamil Ryder MD at [facility] ENDO    CORNEAL TRANSPLANT         Review of Systems:   As documented in primary team H&P    Home Meds:   Prior to Admission medications    Medication Sig Start Date End Date Taking? Authorizing Provider   acyclovir (ZOVIRAX) 200 MG capsule Take 2 capsules (400 mg total) by mouth 3 (three) times daily. 11/25/16 1/19/19 Yes Gabrielle Muniz MD   busPIRone (BUSPAR) 10 MG tablet Take 10 mg by mouth 3 (three) times daily.   Yes Historical Provider, MD   citalopram (CELEXA) 20 MG tablet Take 1 tablet (20 mg total) by mouth once daily. 6/2/18 6/2/19 Yes Victorina Zacarias MD "   cyproheptadine (,PERIACTIN,) 2 mg/5 mL syrup Take by mouth every 8 (eight) hours.   Yes Historical Provider, MD   emtricitabine-tenofovir 200-300 mg (TRUVADA) 200-300 mg Tab Take 1 tablet by mouth once daily.   Yes Historical Provider, MD   ergocalciferol (VITAMIN D2) 50,000 unit Cap Take 50,000 Units by mouth every 7 days.   Yes Historical Provider, MD   ferrous sulfate (FEOSOL) 325 mg (65 mg iron) Tab tablet Take 325 mg by mouth daily with breakfast.   Yes Historical Provider, MD   hydroxychloroquine (PLAQUENIL) 200 mg tablet Take 200 mg by mouth 2 (two) times daily.   Yes Historical Provider, MD   hydrOXYzine HCl (ATARAX) 25 MG tablet Take 25 mg by mouth 3 (three) times daily.   Yes Historical Provider, MD   lipase/protease/amylase (CREON ORAL) Take 6,000 capsules by mouth 2 (two) times daily.   Yes Historical Provider, MD   montelukast (SINGULAIR) 10 mg tablet Take 10 mg by mouth every evening.   Yes Historical Provider, MD   omeprazole (PRILOSEC) 20 MG capsule Take 20 mg by mouth once daily.   Yes Historical Provider, MD   ondansetron (ZOFRAN) 8 MG tablet Take 8 mg by mouth every 8 (eight) hours.   Yes Historical Provider, MD   azithromycin (ZITHROMAX) 500 MG tablet Take 1 tablet (500 mg total) by mouth once daily. 6/1/18   Victorina Zacarias MD   hydrocortisone 1 % cream Apply topically 2 (two) times daily. 6/4/15 3/8/16  Mohsen Davila MD   ibuprofen (ADVIL,MOTRIN) 600 MG tablet Take 1 tablet (600 mg total) by mouth every 6 (six) hours as needed for Pain. 10/14/16   Jose Kay PA-C   lactulose (CHRONULAC) 10 gram/15 mL solution Take by mouth 3 (three) times daily.    Historical Provider, MD   megestrol (MEGACE) 40 MG Tab Take 1 tablet (40 mg total) by mouth once daily. 8/1/16 8/1/17  Mohsen Davila MD   methadone (DOLOPHINE) 10 mg/mL solution Take 10 mg by mouth 2 (two) times daily.    Historical Provider, MD   miconazole (MICOTIN) 2 % vaginal cream Place 1 applicator vaginally every evening. 11/30/16    Loretta Moreno MD   nicotine (NICODERM CQ) 21 mg/24 hr Place 1 patch onto the skin once daily. 8/1/16   Mohsen Davila MD   oxycodone-acetaminophen (PERCOCET) 2.5-325 mg per tablet Take 1 tablet by mouth every 8 (eight) hours as needed for Pain. 11/30/16   Loretta Moreno MD   sulfamethoxazole-trimethoprim 800-160mg (BACTRIM DS) 800-160 mg Tab Take 1 tablet by mouth once daily. 6/2/18   Victorina Zacarias MD     Scheduled Meds:    acyclovir  400 mg Oral TID    busPIRone  10 mg Oral TID    cefTRIAXone (ROCEPHIN) IVPB  1 g Intravenous Q12H    citalopram  20 mg Oral Daily    fluconazole (DIFLUCAN) IVPB  400 mg Intravenous Q24H    haloperidol lactate  5 mg Intramuscular Once    hydroxychloroquine  200 mg Oral BID    hydrOXYzine HCl  25 mg Oral TID    LIPASE-PROTEASE-AMYLASE 5,000-17,000 -24,000 UNITS  1 capsule Oral TID WM    megestrol  40 mg Oral Daily    montelukast  10 mg Oral QHS    nicotine  1 patch Transdermal Daily    pantoprazole  40 mg Oral Daily    senna-docusate 8.6-50 mg  1 tablet Oral BID    [START ON 1/24/2019] sulfamethoxazole-trimethoprim 800-160mg  1 tablet Oral Daily     Continuous Infusions:   PRN Meds:acetaminophen, diphenhydrAMINE, OLANZapine, QUEtiapine, sodium chloride 0.9%  Anticoagulants/Antiplatelets: no anticoagulation    Allergies: Review of patient's allergies indicates:  No Known Allergies  Sedation Hx: have not been any systemic reactions    Labs:  No results for input(s): INR in the last 168 hours.    Invalid input(s):  PT,  PTT    Recent Labs   Lab 01/23/19  0502   WBC 3.21*   HGB 10.7*   HCT 30.7*   MCV 87         Recent Labs   Lab 01/23/19  0502   GLU 76   *   K 3.6      CO2 21*   BUN 6   CREATININE 1.3   CALCIUM 9.2   MG 2.0   ALT 38   AST 55*   ALBUMIN 2.9*   BILITOT 0.2         Vitals:  Temp: (!) 100.4 °F (38 °C) (01/23/19 1344)  Pulse: 94 (01/23/19 1233)  Resp: 17 (01/23/19 1233)  BP: 121/73 (01/23/19 1344)  SpO2: 97 % (01/23/19 1233)      Physical Exam:  ASA: III  Mallampati: II    General: distressed  Mental Status: confused and combative  HEENT: normocephalic, atraumatic  Chest: tachipenic   Heart: regular heart rate  Abdomen: nondistended  Extremity: moves all extremities    Plan:   1. AMS with HIV and low CD4 concerning for CNS infxn - Will proceed with attempt at image-guided LP however, with concerns for safety given the AMS and reported uncontrollable behavior requiring restraints and haldol.    Atilio Mckeon MD  Interventional Radiology

## 2019-01-23 NOTE — ASSESSMENT & PLAN NOTE
Noted and reported by family  Mentation precludes counseling at this time  Will address when patient is more appropriate

## 2019-01-23 NOTE — PROGRESS NOTES
[facility]  Infectious Disease  Progress Note    Patient Name: Jayden Marcial  MRN: [id number]  Admission Date: 1/19/2019  Length of Stay: 4 days  Attending Physician: Victorina Zacarias MD  Primary Care Provider: Teddy Kohler MD    Isolation Status: No active isolations  Assessment/Plan:      * AIDS       43 year old female with AIDS (CD4 22/2.9%) admitted with acute worsening of mental status, chronic cough, and burning with swallow. Patient's father in law at bedside and is aware of AIDS diagnosis. Said that pt was in hospice, but this was reversed and is now living with him. She has apparently been having increasing paranoid behavior, but over past few days has become more quiet and nonverbal and confused. He said that she has been taking her HIV meds daily. Spoke with her [facility] pharmacy outpatient and she has been filling Truvada monotherapy as prescribed by Dr Kohler. Denies that she has outpatient ID physician, but is interested in establishing care at [facility] on [address].      Patient initially nonverbal to questioning. However mentation improved today and she reports cough, neck stiffness, headache, blurry left sided vision, mouth pain and rash. No hypoxia. Clonus present along with round areas of darkened pigmentation diffusely, especially prominent on back.       Work up: CT head, no acute findings. CXR mild interstitial edema vs pneumonia.      Given AIDS/low CD4, she could have any number of opportunistic infections. Primary problem is AMS. Needs LP with opening pressure, cell count, glucose, protein, cultures. Will continue fluconazole for possible candida esophagitis but decrease dose to 400 mg daily as Cryptococcal serum antigen is negative. Also screen for histoplasma, syphilis, and toxoplasma. If toxo IgG positive, would consider MRI brain.      Infiltrate on CXR could be fungal process (work up above) or known NTM. Not classic presentation of PCP and patient sating  "well on room air. Checking pcp smear and b-d glucan. Would also check AFB smear x3 and 2 M.TB PCR sputum for TB rule out (and to see if NTM continues to grow). Needs 3x/wk pcp ppx unless toxo igG positive, then would need Bactrim daily. No clear signs of bacterial pneumonia, resp gram stain/culture pending. Can continue ceftriaxone, but would avoid quinolones and macrolides during TB rule out. Check afb bcx for dMAC.     Patient's ultimate prognosis depends on HARRT. Would not start now given risk of IRIS, especially if she has cryptococcal meningits. Truvada should never be used as monotherapy in known HIV patients. Have sent genotype. Concern for M184v and k65R. Will coordinate f/u at [facility] per request.         ID will follow. Please call for any questions. Thank you.  Sultana Quezada PA-C  Pager: [phone]      Subjective:     Principal Problem:AIDS    HPI: Patient is a 43 year old Female with a past medical history of AIDS (most recently on outpatient Truvada), medication non-compliance, crack-cocaine use history of KANDY (incompletely treated) that presents with feeling "sick" for several days.  Patient not able to elaborate more at time of examination. Her step dad provides history. She has had symptoms of nausea, vomiting, abdominal pain, SOB, cough with mild sputum production. Her mental status has also declined. Symptoms started right after [place] and he mentions patient drinking alcohol more frequently recently. Per chat review, patient ran out of all her home medications about a week ago and that she has not had a chance to refill them as of yet.    The patient was previously on hospice at [facility]. She was taken off hospice because they felt she was improving and patient decided to go back home to live with father. Most recently missed her PCP appt with Dr. Kohler. He prescribed her Truvada as an outpatient. Patient was started on rocephin and azithromycin for pneumonia. Now on Ceftriaxone and " Bactrim. CD4 22, 2.9%. Flu, blood and urine cultures negative. Father feels dementia and psychosis worsening but also confounded by drug use. ID consulted for abx recs.        Interval History: Mentation appears improved. Answering to questions. Reports neck stiffness, headache, blurry L sided vision and rash. Also continues to have mouth pain and difficulty opening her mouth. She verbalizes she doesn't clearly understand what is going on with her. Asking for her father.     Review of Systems   Unable to perform ROS: Mental status change (able to answer some questions)   Constitutional: Positive for chills.   HENT:        Mouth pain   Eyes: Positive for visual disturbance.   Respiratory: Positive for cough and shortness of breath.    Musculoskeletal: Positive for neck stiffness.   Skin: Positive for rash.   Neurological: Positive for weakness and headaches.     Objective:     Vital Signs (Most Recent):  Temp: (!) 100.4 °F (38 °C) (01/23/19 1344)  Pulse: 94 (01/23/19 1233)  Resp: 17 (01/23/19 1233)  BP: 121/73 (01/23/19 1344)  SpO2: 97 % (01/23/19 1233) Vital Signs (24h Range):  Temp:  [98.5 °F (36.9 °C)-100.4 °F (38 °C)] 100.4 °F (38 °C)  Pulse:  [] 94  Resp:  [17-19] 17  SpO2:  [90 %-97 %] 97 %  BP: (111-128)/(73-81) 121/73     Weight: 48.5 kg (106 lb 14.8 oz)  Body mass index is 20.2 kg/m².    Estimated Creatinine Clearance: 41.2 mL/min (based on SCr of 1.3 mg/dL).    Physical Exam   Constitutional: Vital signs are normal. She has a sickly appearance. No distress.   Thin appearing   HENT:   Head: Normocephalic and atraumatic.   Mouth/Throat: Mucous membranes are dry.   Limited mouth exam as patient unable to open her mouth wide enough to examine.   Neck:   Decreased ROM of neck   Cardiovascular: Normal rate and regular rhythm.   No murmur heard.  Pulmonary/Chest: Effort normal. She has no wheezes. She has rales in the right lower field.   Abdominal: Soft. Normal appearance and bowel sounds are normal. She  exhibits no distension. There is no tenderness.   Musculoskeletal: She exhibits no edema.   Neurological: She is alert. A cranial nerve deficit is present.   Appears lethargic but mentation improved today. Able to answer to some questions. Clonus present.   Skin: Skin is warm and dry. No erythema.   Multiple darkened macular skin lesions prominently on back.       Significant Labs: All pertinent labs within the past 24 hours have been reviewed.    Significant Imaging: I have reviewed all pertinent imaging results/findings within the past 24 hours.

## 2019-01-23 NOTE — SUBJECTIVE & OBJECTIVE
Interval History: Mentation appears improved. Answering to questions. Reports neck stiffness, headache, blurry L sided vision and rash. Also continues to have mouth pain and difficulty opening her mouth. She verbalizes she doesn't clearly understand what is going on with her. Asking for her father.     Review of Systems   Unable to perform ROS: Mental status change (able to answer some questions)   Constitutional: Positive for chills.   HENT:        Mouth pain   Eyes: Positive for visual disturbance.   Respiratory: Positive for cough and shortness of breath.    Musculoskeletal: Positive for neck stiffness.   Skin: Positive for rash.   Neurological: Positive for weakness and headaches.     Objective:     Vital Signs (Most Recent):  Temp: (!) 100.4 °F (38 °C) (01/23/19 1344)  Pulse: 94 (01/23/19 1233)  Resp: 17 (01/23/19 1233)  BP: 121/73 (01/23/19 1344)  SpO2: 97 % (01/23/19 1233) Vital Signs (24h Range):  Temp:  [98.5 °F (36.9 °C)-100.4 °F (38 °C)] 100.4 °F (38 °C)  Pulse:  [] 94  Resp:  [17-19] 17  SpO2:  [90 %-97 %] 97 %  BP: (111-128)/(73-81) 121/73     Weight: 48.5 kg (106 lb 14.8 oz)  Body mass index is 20.2 kg/m².    Estimated Creatinine Clearance: 41.2 mL/min (based on SCr of 1.3 mg/dL).    Physical Exam   Constitutional: Vital signs are normal. She has a sickly appearance. No distress.   Thin appearing   HENT:   Head: Normocephalic and atraumatic.   Mouth/Throat: Mucous membranes are dry.   Limited mouth exam as patient unable to open her mouth wide enough to examine.   Neck:   Decreased ROM of neck   Cardiovascular: Normal rate and regular rhythm.   No murmur heard.  Pulmonary/Chest: Effort normal. She has no wheezes. She has rales in the right lower field.   Abdominal: Soft. Normal appearance and bowel sounds are normal. She exhibits no distension. There is no tenderness.   Musculoskeletal: She exhibits no edema.   Neurological: She is alert. A cranial nerve deficit is present.   Appears lethargic  but mentation improved today. Able to answer to some questions. Clonus present.   Skin: Skin is warm and dry. No erythema.   Multiple darkened macular skin lesions prominently on back.       Significant Labs: All pertinent labs within the past 24 hours have been reviewed.    Significant Imaging: I have reviewed all pertinent imaging results/findings within the past 24 hours.

## 2019-01-23 NOTE — ASSESSMENT & PLAN NOTE
Admittedly non-compliant with medications at home  Truvada stopped and unknown ID physician in the outpatient setting  CD4 count 22  ID consult appreciated  Continued rocephin and stopped azithromycin  On IV bactrim and high dose fluconazole  Possible cryptococcal meningitis as cause for encephalopathy (acute mental status change, skin lesions).    Cryptococcal serum antigen pending  IR consulted for LP with opening pressure delayed due to scheduling of room  Will send for crypto antigen CSF, cell count, glucose, protein.  Also screen for histoplasma, syphilis, and toxoplasma.

## 2019-01-23 NOTE — PROCEDURES
Pt transported to IR to attempt LP. However, pt with significant rigors, uncooperative and became highly combative when preparing for attempted LP. Can not safely perform procedure in pts current condition.    Furthermore, upon pre-procedural examination of patient and review of pts imaging, I see no clear indication for the need of image guidance and radiation exposure for the LP.     With pre-procedural sedation, bedside LP should be safe to perform, eliminating the need for radiation exposure to patient.    Atilio Mckeon MD  Interventional Radiology

## 2019-01-23 NOTE — PHYSICIAN QUERY
PT Name: Jayden Marcial  MR #: [id number]    Physician Query Form - Nutrition Clarification     CDS/: Edwige Gaitan               Contact information: [email or web address]    This form is a permanent document in the medical record.     Query Date: January 23, 2019    By submitting this query, we are merely seeking further clarification of documentation.. Please utilize your independent clinical judgment when addressing the question(s) below.    The Medical record contains the following:   Indicators  Supporting Clinical Findings Location in Medical Record    % of Estimated Energy Intake over a time frame from p.o., TF, or TPN      Weight Status over a time frame      Subcutaneous Fat and/or Muscle Loss      Fluid Accumulation or Edema      Reduced  Strength     X Wt / BMI / Usual Body Weight BMI= 18 H&P    Delayed Wound Healing / Failure to Thrive     X Acute or Chronic Illness AIDS, Pneumonia, Encephalopathy acute H&P   X Medication megestrol tablet  MAR 1/19    Treatment     X Other Cachetic and chronically ill appearing    PN 1/22     AND / ASPEN Clinical Characteristics (October 2011)  A minimum of two characteristics is recommended for diagnosing either moderate or severe malnutrition   Mild Malnutrition Moderate Malnutrition Severe Malnutrition   Energy Intake from p.o., TF or TPN. < 75% intake of estimated energy needs for less than 7 days < 75% intake of estimated energy needs for greater than 7 days < 50% intake of estimated energy needs for > 5 days   Weight Loss 1-2% in 1 month  5% in 3 months  7.5% in 6 months  10% in 1 year 1-2 % in 1 week  5% in 1 month  7.5% in 3 months  10% in 6 months  20% in 1 year > 2% in 1 week  > 5% in 1 month  > 7.5% in 3 months  > 10% in 6 months  > 20% in 1 year   Physical Findings     None *Mild subcutaneous fat and/or muscle loss  *Mild fluid accumulation  *Stage II decubitus  *Surgical wound or non-healing wound *Mod/severe subcutaneous fat and/or muscle loss  *Mod/severe  fluid accumulation  *Stage III or IV decubitus  *Non-healing surgical wound     Provider, please specify diagnosis or diagnoses associated with above clinical findings.    [  ] Mild Protein-Calorie Malnutrition   [ X ] Cachexia   [  ] Underweight   [  ] Other Nutritional Diagnosis (please specify):    [  ] Other:    [  ] Clinically Undetermined       Please document in your progress notes daily for the duration of treatment until resolved and include in your discharge summary.

## 2019-01-23 NOTE — PROGRESS NOTES
"[facility] Hospital Medicine  Progress Note    Patient Name: Jayden Marcial  MRN: [id number]  Patient Class: IP- Inpatient   Admission Date: 1/19/2019  Length of Stay: 4 days  Attending Physician: Victorina Zacarias MD  Primary Care Provider: Teddy Kohler MD        Subjective:     Principal Problem:AIDS    HPI:  Patient is a 43 year old F with a past medical history of HIV/AIDS, medication non-compliance, crack-cocaine use, anxiety, history of KANDY that presents with feeling "sick" for several days.  Patient not able to elaborate more at time of examination.  She has had symptoms of nausea, vomiting, abdominal pain, cough with mild sputum production.  She denies any fevers, shortness of breath, diarrhea, constipation, dizziness, lightheadedness, vision changes.  Patient states that she ran out of all her home medications about a week ago and that she has not had a chance to refill them as of yet.  Patient also endorses using crack-cocaine 2 days ago and began feeling worse after this.    Hospital Course:  Ms. Marcial was admitted with feeling "sick" in addition to underlying AIDS, non-compliance with meds and crack-cocaine use. She was placed in ICU due to uncontrollable behavior that required restraints and haldol injections. Her BP dropped briefly but did not require pressor support. Patient was calm, cooperative, out of restraints and stable for transfer to floor on 1/20. Of note, father at bedside said that patient was previously on hospice at [facility]. She was taken off hospice because they felt she was improving and patient decided to go back home to live with her father. He reported that she leaves the home and uses crack-cocaine. She missed last appt with PCP and did not get meds refilled. Pt was placed on empiric rocephin and azithromycin for pneumonia. Father feels dementia and psychosis worsening but also confounded by drug use. Pt with need of sitter for safety given constant need for redirection " while on the floor. Psych and ID consulted. ID stopped azithromycin and added bactrim given her CD4 count. There was also concern for cryptococcal meningitis (acute mental status change, skin lesions). Cryptococcal serum antigen has been ordered and high dose fluconazole for Cryptococcal coverage in the interim on 1/21..  ID recommended LP with opening pressure to get crypto antigen csf, cell count, glucose, protein. Also screen for histoplasma, syphilis, and toxoplasma. IR consulted for LP.    Interval History: Pt with no acute events, reports she is scared today. Reassured her on plan of care. More calm and appropriate today.    Review of Systems   Unable to perform ROS: Mental status change     Objective:     Vital Signs (Most Recent):  Temp: 98.5 °F (36.9 °C) (01/23/19 0837)  Pulse: 101 (01/23/19 0837)  Resp: 17 (01/23/19 0837)  BP: 123/75 (01/23/19 0837)  SpO2: 97 % (01/23/19 0837) Vital Signs (24h Range):  Temp:  [98.2 °F (36.8 °C)-99 °F (37.2 °C)] 98.5 °F (36.9 °C)  Pulse:  [] 101  Resp:  [17-19] 17  SpO2:  [90 %-97 %] 97 %  BP: (115-128)/(64-81) 123/75     Weight: 48.5 kg (106 lb 14.8 oz)  Body mass index is 20.2 kg/m².    Intake/Output Summary (Last 24 hours) at 1/23/2019 1036  Last data filed at 1/23/2019 0907  Gross per 24 hour   Intake 240 ml   Output --   Net 240 ml      Physical Exam   Constitutional: Vital signs are normal. She appears well-developed. She has a sickly appearance. No distress.   Cachetic and chronically ill appearing, but appears better today   HENT:   Head: Normocephalic.   Mouth/Throat: Mucous membranes are dry.   Mucosal ulcerations   Cardiovascular: Normal rate and regular rhythm.   No murmur heard.  Pulmonary/Chest: Effort normal. She has no wheezes. She has rales in the right lower field.   Abdominal: Soft. Normal appearance and bowel sounds are normal. She exhibits no distension. There is no tenderness.   Musculoskeletal: She exhibits no edema.   Neurological: She is  alert.   Answered an occasional simple question with yes/no, nods to questions, delayed and oriented x 1   Skin: Skin is warm and dry. Capillary refill takes less than 2 seconds. No erythema.   Multiple macular dark skin lesions noted on back   Psychiatric: Her mood appears anxious. Her speech is delayed. She is slowed. Cognition and memory are impaired. She is attentive.       Significant Labs: All pertinent labs within the past 24 hours have been reviewed.    Significant Imaging: I have reviewed and interpreted all pertinent imaging results/findings within the past 24 hours.    Assessment/Plan:      * AIDS    Admittedly non-compliant with medications at home  Truvada stopped and unknown ID physician in the outpatient setting  CD4 count 22  ID consult appreciated  Continued rocephin and stopped azithromycin  On IV bactrim and high dose fluconazole  Possible cryptococcal meningitis as cause for encephalopathy (acute mental status change, skin lesions).    Cryptococcal serum antigen pending  IR consulted for LP with opening pressure delayed due to scheduling of room  Will send for crypto antigen CSF, cell count, glucose, protein.  Also screen for histoplasma, syphilis, and toxoplasma.     Encephalopathy acute    As discussed above  Unclear baseline but with reported change  Slowly improving and becoming more appropriate     Pneumonia due to infectious organism    Given her history of HIV and medication noncompliance  Continue to treat for possible PCP pneumonia  Continue Rocephin, Bactrim  DuoNeb breathing treatments q.6 hours  Check sputum culture  Supplemental O2 as needed  AFB smears in progress  Appreciate ID input     Polysubstance abuse    Noted and reported by family  Mentation precludes counseling at this time  Will address when patient is more appropriate     Thrombocytopenia    No signs of bleeding at this time  Normalized platelets  Resolved     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 Corey     Tobacco abuse    Nicotine patch as needed.  Pt is not mentally appropriate for counseling at this time     Acute renal failure    Patient with baseline creatinine around 0.8 normally  On admit it was 3.3  Dehydration likely component to acute renal failure  IV fluids with normal saline at 125 cc an hour continuous  Resolved       VTE Risk Mitigation (From admission, onward)        Ordered     IP VTE LOW RISK PATIENT  Once      01/19/19 1118     Place JOHANN hose  Until discontinued      01/19/19 1118              Victorina Zacarias MD  Department of Hospital Medicine   [facility]

## 2019-01-23 NOTE — PLAN OF CARE
Problem: Fall Injury Risk  Goal: Absence of Fall and Fall-Related Injury  Outcome: Ongoing (interventions implemented as appropriate)  Intervention: Identify and Manage Contributors to Fall Injury Risk   01/23/19 0527   Manage Acute Allergic Reaction   Medication Review/Management medications reviewed     Intervention: Promote Injury-Free Environment   01/23/19 0527   Optimize [place] and Functional Mobility   Environmental Safety Modification assistive device/personal items within reach;clutter free environment maintained;lighting adjusted;room near unit station;room organization consistent   Optimize Balance and Safe Activity   Safety Promotion/Fall Prevention assistive device/personal item within reach;bed alarm set;Fall Risk reviewed with patient/family;Fall Risk signage in place;medications reviewed;nonskid shoes/socks when out of bed;side rails raised x 3;instructed to call staff for mobility         Comments: Pt remains free from falls. C/o of pain and educated about pain management, refuse Tylenol. Telesitter continued. IV abx continued per order. Repositions self with assistance. Incontinence care and/or bedpan provided. No distress noted throughout shift.

## 2019-01-23 NOTE — ASSESSMENT & PLAN NOTE
Given her history of HIV and medication noncompliance  Continue to treat for possible PCP pneumonia  Continue Rocephin, Bactrim  DuoNeb breathing treatments q.6 hours  Check sputum culture  Supplemental O2 as needed  AFB smears in progress  Appreciate ID input

## 2019-01-23 NOTE — ASSESSMENT & PLAN NOTE
Admittedly non-compliant with medications at home  Truvada stopped and unknown ID physician in the outpatient setting  CD4 count 22  ID consult appreciated  Continued rocephin and stopped azithromycin  On IV bactrim and high dose fluconazole  Possible cryptococcal meningitis as cause for encephalopathy (acute mental status change, skin lesions).    Cryptococcal serum antigen pending  IR consulted for LP with opening pressure  Will need crypto antigen CSF, cell count, glucose, protein.  Also screen for histoplasma, syphilis, and toxoplasma.

## 2019-01-24 LAB
1,3 BETA GLUCAN SPEC-MCNC: <31 PG/ML
ALBUMIN SERPL BCP-MCNC: 2.9 G/DL
ALP SERPL-CCNC: 74 U/L
ALT SERPL W/O P-5'-P-CCNC: 41 U/L
ANION GAP SERPL CALC-SCNC: 10 MMOL/L
AST SERPL-CCNC: 63 U/L
BACTERIA BLD CULT: NORMAL
BACTERIA BLD CULT: NORMAL
BASOPHILS # BLD AUTO: 0.01 K/UL
BASOPHILS NFR BLD: 0.4 %
BILIRUB SERPL-MCNC: 0.2 MG/DL
BUN SERPL-MCNC: 9 MG/DL
CALCIUM SERPL-MCNC: 9.2 MG/DL
CHLORIDE SERPL-SCNC: 103 MMOL/L
CLARITY CSF: CLEAR
CO2 SERPL-SCNC: 22 MMOL/L
COLOR CSF: COLORLESS
CREAT SERPL-MCNC: 1.1 MG/DL
DIFFERENTIAL METHOD: ABNORMAL
EOSINOPHIL # BLD AUTO: 0 K/UL
EOSINOPHIL NFR BLD: 1.1 %
ERYTHROCYTE [DISTWIDTH] IN BLOOD BY AUTOMATED COUNT: 15.8 %
EST. GFR  (AFRICAN AMERICAN): >60 ML/MIN/1.73 M^2
EST. GFR  (NON AFRICAN AMERICAN): >60 ML/MIN/1.73 M^2
GLUCOSE CSF-MCNC: 32 MG/DL
GLUCOSE SERPL-MCNC: 57 MG/DL
HCT VFR BLD AUTO: 28.9 %
HGB BLD-MCNC: 10.1 G/DL
HIV UQ DATE RECEIVED: ABNORMAL
HIV UQ DATE REPORTED: ABNORMAL
HIV1 RNA # SERPL NAA+PROBE: ABNORMAL COPIES/ML
HIV1 RNA SERPL NAA+PROBE-LOG#: 5.85 LOG (10) COPIES/ML
HIV1 RNA SERPL QL NAA+PROBE: DETECTED
INDIA INK PREP CSF: NORMAL
LYMPHOCYTES # BLD AUTO: 0.8 K/UL
LYMPHOCYTES NFR BLD: 29.6 %
MAGNESIUM SERPL-MCNC: 1.7 MG/DL
MCH RBC QN AUTO: 30.1 PG
MCHC RBC AUTO-ENTMCNC: 34.9 G/DL
MCV RBC AUTO: 86 FL
MONOCYTES # BLD AUTO: 0.6 K/UL
MONOCYTES NFR BLD: 22.2 %
NEUTROPHILS # BLD AUTO: 1.3 K/UL
NEUTROPHILS NFR BLD: 46.7 %
PHOSPHATE SERPL-MCNC: 4.2 MG/DL
PLATELET # BLD AUTO: 248 K/UL
PMV BLD AUTO: 10.2 FL
POTASSIUM SERPL-SCNC: 3.7 MMOL/L
PROT CSF-MCNC: 29 MG/DL
PROT SERPL-MCNC: 8.3 G/DL
RBC # BLD AUTO: 3.35 M/UL
RBC # CSF: 235 /CU MM
SODIUM SERPL-SCNC: 135 MMOL/L
SPECIMEN VOL CSF: 3 ML
WBC # BLD AUTO: 2.84 K/UL
WBC # CSF: 2 /CU MM

## 2019-01-24 PROCEDURE — 87210 SMEAR WET MOUNT SALINE/INK: CPT

## 2019-01-24 PROCEDURE — 87205 SMEAR GRAM STAIN: CPT

## 2019-01-24 PROCEDURE — 82945 GLUCOSE OTHER FLUID: CPT

## 2019-01-24 PROCEDURE — 85025 COMPLETE CBC W/AUTO DIFF WBC: CPT

## 2019-01-24 PROCEDURE — 87070 CULTURE OTHR SPECIMN AEROBIC: CPT

## 2019-01-24 PROCEDURE — 86403 PARTICLE AGGLUT ANTBDY SCRN: CPT

## 2019-01-24 PROCEDURE — 25000003 PHARM REV CODE 250: Performed by: EMERGENCY MEDICINE

## 2019-01-24 PROCEDURE — 11000001 HC ACUTE MED/SURG PRIVATE ROOM

## 2019-01-24 PROCEDURE — 63600175 PHARM REV CODE 636 W HCPCS: Performed by: PHYSICIAN ASSISTANT

## 2019-01-24 PROCEDURE — 36415 COLL VENOUS BLD VENIPUNCTURE: CPT

## 2019-01-24 PROCEDURE — 84100 ASSAY OF PHOSPHORUS: CPT

## 2019-01-24 PROCEDURE — 89051 BODY FLUID CELL COUNT: CPT

## 2019-01-24 PROCEDURE — 63600175 PHARM REV CODE 636 W HCPCS: Performed by: EMERGENCY MEDICINE

## 2019-01-24 PROCEDURE — 25000003 PHARM REV CODE 250: Performed by: PHYSICIAN ASSISTANT

## 2019-01-24 PROCEDURE — S4991 NICOTINE PATCH NONLEGEND: HCPCS | Performed by: EMERGENCY MEDICINE

## 2019-01-24 PROCEDURE — 80053 COMPREHEN METABOLIC PANEL: CPT

## 2019-01-24 PROCEDURE — 86592 SYPHILIS TEST NON-TREP QUAL: CPT

## 2019-01-24 PROCEDURE — S0166 INJ OLANZAPINE 2.5MG: HCPCS | Performed by: HOSPITALIST

## 2019-01-24 PROCEDURE — 83735 ASSAY OF MAGNESIUM: CPT

## 2019-01-24 PROCEDURE — 87102 FUNGUS ISOLATION CULTURE: CPT

## 2019-01-24 PROCEDURE — 84157 ASSAY OF PROTEIN OTHER: CPT

## 2019-01-24 PROCEDURE — 25000003 PHARM REV CODE 250: Performed by: HOSPITALIST

## 2019-01-24 PROCEDURE — 86738 MYCOPLASMA ANTIBODY: CPT

## 2019-01-24 RX ORDER — LORAZEPAM 2 MG/ML
2 INJECTION INTRAMUSCULAR
Status: DISCONTINUED | OUTPATIENT
Start: 2019-01-24 | End: 2019-01-25

## 2019-01-24 RX ORDER — OLANZAPINE 10 MG/2ML
10 INJECTION, POWDER, FOR SOLUTION INTRAMUSCULAR ONCE
Status: COMPLETED | OUTPATIENT
Start: 2019-01-24 | End: 2019-01-24

## 2019-01-24 RX ADMIN — BUSPIRONE HYDROCHLORIDE 10 MG: 10 TABLET ORAL at 09:01

## 2019-01-24 RX ADMIN — MONTELUKAST SODIUM 10 MG: 10 TABLET, FILM COATED ORAL at 08:01

## 2019-01-24 RX ADMIN — ACYCLOVIR 400 MG: 200 CAPSULE ORAL at 08:01

## 2019-01-24 RX ADMIN — FLUCONAZOLE, SODIUM CHLORIDE 400 MG: 2 INJECTION INTRAVENOUS at 06:01

## 2019-01-24 RX ADMIN — MEGESTROL ACETATE 40 MG: 20 TABLET ORAL at 09:01

## 2019-01-24 RX ADMIN — SENNOSIDES AND DOCUSATE SODIUM 1 TABLET: 8.6; 5 TABLET ORAL at 09:01

## 2019-01-24 RX ADMIN — PANCRELIPASE LIPASE, PANCRELIPASE PROTEASE, PANCRELIPASE AMYLASE 1 CAPSULE: 5000; 17000; 24000 CAPSULE, DELAYED RELEASE ORAL at 09:01

## 2019-01-24 RX ADMIN — ACYCLOVIR 400 MG: 200 CAPSULE ORAL at 09:01

## 2019-01-24 RX ADMIN — PANTOPRAZOLE SODIUM 40 MG: 40 TABLET, DELAYED RELEASE ORAL at 09:01

## 2019-01-24 RX ADMIN — CEFTRIAXONE 1 G: 1 INJECTION, SOLUTION INTRAVENOUS at 11:01

## 2019-01-24 RX ADMIN — HYDROXYCHLOROQUINE SULFATE 200 MG: 200 TABLET, FILM COATED ORAL at 08:01

## 2019-01-24 RX ADMIN — NICOTINE 1 PATCH: 21 PATCH, EXTENDED RELEASE TRANSDERMAL at 09:01

## 2019-01-24 RX ADMIN — ACETAMINOPHEN 650 MG: 325 TABLET ORAL at 05:01

## 2019-01-24 RX ADMIN — HYDROXYZINE HYDROCHLORIDE 25 MG: 25 TABLET, FILM COATED ORAL at 09:01

## 2019-01-24 RX ADMIN — SULFAMETHOXAZOLE AND TRIMETHOPRIM 1 TABLET: 800; 160 TABLET ORAL at 09:01

## 2019-01-24 RX ADMIN — HYDROXYZINE HYDROCHLORIDE 25 MG: 25 TABLET, FILM COATED ORAL at 08:01

## 2019-01-24 RX ADMIN — BUSPIRONE HYDROCHLORIDE 10 MG: 10 TABLET ORAL at 08:01

## 2019-01-24 RX ADMIN — SENNOSIDES AND DOCUSATE SODIUM 1 TABLET: 8.6; 5 TABLET ORAL at 08:01

## 2019-01-24 RX ADMIN — HYDROXYCHLOROQUINE SULFATE 200 MG: 200 TABLET, FILM COATED ORAL at 09:01

## 2019-01-24 RX ADMIN — OLANZAPINE 10 MG: 10 INJECTION, POWDER, FOR SOLUTION INTRAMUSCULAR at 09:01

## 2019-01-24 RX ADMIN — CEFTRIAXONE 1 G: 1 INJECTION, SOLUTION INTRAVENOUS at 10:01

## 2019-01-24 RX ADMIN — CITALOPRAM HYDROBROMIDE 20 MG: 20 TABLET ORAL at 09:01

## 2019-01-24 NOTE — ASSESSMENT & PLAN NOTE
Given her history of HIV and medication noncompliance  Pt was on treatment for possible PCP pneumonia, now changed for prophylaxis only  Continue Rocephin, Bactrim as per ID  DuoNeb breathing treatments q.6 hours  Sputum culture pending  Supplemental O2 as needed  AFB smears in progress  Appreciate ID input

## 2019-01-24 NOTE — PLAN OF CARE
Problem: Adult Inpatient Plan of Care  Goal: Plan of Care Review  Outcome: Ongoing (interventions implemented as appropriate)  Pt has episodes of confusions and needs to be reoriented, slurred speech, and unsteady gait, incontinence care provided as needed, tolerating abx. Attempted to have Lumbar puncture, was unsuccessful, had successful MRI. Verbalizes emotions through shift, safety maintained.

## 2019-01-24 NOTE — PROGRESS NOTES
AMARJIT contacted [facility] @ [phone] to schedule ID follow-up, AMARJIT spoke to Yessenia, patient will see Dr. Malgorzata Bui on 1/31/19 @ 3:30pm.

## 2019-01-24 NOTE — PLAN OF CARE
Problem: Adult Inpatient Plan of Care  Goal: Plan of Care Review  Recommendations    Advance diet as appropriate to meet energy and protein needs  Goals:  Initiate feeding within 48 hours or per goals of care   Nutrition Goal Status: new

## 2019-01-24 NOTE — SUBJECTIVE & OBJECTIVE
Interval History: Pt with no acute events, patient has been calm and cooperative. Overnight nurse and am nurse reports she has been good. She reported that she has pain and would like some pain medication and also wanted to see her sister.     Review of Systems   Unable to perform ROS: Mental status change     Objective:     Vital Signs (Most Recent):  Temp: 98.6 °F (37 °C) (01/24/19 0728)  Pulse: 102 (01/24/19 0728)  Resp: 18 (01/24/19 0728)  BP: 123/73 (01/24/19 0728)  SpO2: 100 % (01/24/19 0728) Vital Signs (24h Range):  Temp:  [97.6 °F (36.4 °C)-100.4 °F (38 °C)] 98.6 °F (37 °C)  Pulse:  [] 102  Resp:  [17-20] 18  SpO2:  [91 %-100 %] 100 %  BP: (107-140)/(70-83) 123/73     Weight: 48.5 kg (106 lb 14.8 oz)  Body mass index is 20.2 kg/m².    Intake/Output Summary (Last 24 hours) at 1/24/2019 0821  Last data filed at 1/24/2019 0507  Gross per 24 hour   Intake 50 ml   Output --   Net 50 ml      Physical Exam   Constitutional: Vital signs are normal. She appears well-developed. No distress.   Chronically ill appearing, but appears much improved   HENT:   Head: Normocephalic.   Mouth/Throat: Mucous membranes are not dry.   Mucosal ulcerations improving   Cardiovascular: Normal rate and regular rhythm.   No murmur heard.  Pulmonary/Chest: Effort normal and breath sounds normal. No respiratory distress. She has no wheezes. She has no rales.   Abdominal: Soft. Normal appearance and bowel sounds are normal. She exhibits no distension and no mass. There is no tenderness. There is no guarding.   Musculoskeletal: She exhibits no edema.   Neurological: She is alert.   Talking more and expressing her wish to see more family, calm and cooperative, oriented x 2 and becoming more appropriate.   Skin: Skin is warm. Capillary refill takes less than 2 seconds. She is not diaphoretic. No erythema.   Multiple macular dark skin lesions noted on back   Psychiatric: Her mood appears not anxious. Her speech is delayed. She is  slowed. Cognition and memory are impaired. She is attentive.       Significant Labs: All pertinent labs within the past 24 hours have been reviewed.    Significant Imaging: I have reviewed and interpreted all pertinent imaging results/findings within the past 24 hours.

## 2019-01-24 NOTE — PROGRESS NOTES
"  [facility]  Adult Nutrition  Consult Note    SUMMARY     Recommendations    1. Continue with regular advanced, encourage adequate intake to meet energy and protein needs  2. Add Boost BID to help meet energy and protein needs  Goals:  Initiate feeding within 48 hours or per goals of care   Nutrition Goal Status: new  Communication of RD Recs: other (comment)(POC review)    Reason for Assessment    Reason For Assessment: consult  Diagnosis: infection/sepsis(HIV/AIDS, admitted for emergent eval of anxiety)  Relevant Medical History: AIDS, blind R) eye, cocaine use, HIV dementia, malnutrition, pancreatitis, TB (pulmonary), tobacco abuse, hx of non-compliance, bronchoscopy sx, was previously on hospice then removed because status was thought to be improving  Interdisciplinary Rounds: did not attend  General Information Comments: Saw pt this AM, her friend was present in the room as well (pt consented to her remaining in the room during questioning).  Pt reported that her appetite hadn't changed much PTA, and that she does currently feel hungry.  Pt did not give much insight about her eating habits at home besides stating that she typically eats 2-3 meals/day, and that she "really likes lasagna, wings, and eats a lot of everything." Pt said she used to have Boost 1x/day at home, but she hasn't been able to get any lately.  Pt stated that she'd like to have some Strawberry Boost, and I explained that this may be a viable option in the future, especially to help guarantee high calorie and high protein intake, but this could not occur at the moment, especially with her NPO status. When asked if she had experienced/noticed any wt changes recently, she stated that she thought she had gained a little, and her friend chimed in in agreement.  EPIC wt hx is limited, but does reveal a slight increase in wt from 39.2-41.4 kg in June 2018 to her current wt of 48.5 kg.  NFPE was performed, see malnutrition " "section for details.  I had anticipated providing education about a high protein, high calorie diet and the benefits of nutrient rich foods, but the pt's status started declining after her friend left during the interview, and she began trembling, became increasingly nervous, and said her head hurt, so education was not appropriate at this time and will be provided in the future.  Since original evaluation was conducted, diet has been advanced to regular.  Nutrition Discharge Planning: adequate nutrient intake    Nutrition Risk Screen    Nutrition Risk Screen: other (see comments)(decreased desire to eat)    Nutrition/Diet History    Spiritual, Cultural Beliefs, Oriental orthodox Practices, Values that Affect Care: no  Supplemental Drinks or Food Habits: Boost Plus(Used to have Boost 1x/day, but hasn't lately (couldnt get))  Food Allergies: NKFA  Factors Affecting Nutritional Intake: impaired cognitive status/motor control, NPO(altered mental status)    Anthropometrics    Temp: 98.6 °F (37 °C)  Height Method: Stated  Height: 5' 1" (154.9 cm)  Height (inches): 61 in  Weight Method: Bed Scale  Weight: 48.5 kg (106 lb 14.8 oz)  Weight (lb): 106.92 lb  Ideal Body Weight (IBW), Female: 105 lb  % Ideal Body Weight, Female (lb): 101.83 lb  BMI (Calculated): 20.2  BMI Grade: 18.5-24.9 - normal       Lab/Procedures/Meds    Pertinent Labs Reviewed: reviewed  Pertinent Labs Comments: Na 135, CO2 22, Glucose 57  Pertinent Medications Reviewed: reviewed  Pertinent Medications Comments: acyclovir, buspirone,citalopram, fluconazole, hydroxychloroquine, hydroxyzine HCl, LIPASE-PROTEASE-AMYLASE capsule,  megesterol, olanzapine, pantoprazole, senna-docusate      Estimated/Assessed Needs    Weight Used For Calorie Calculations: 48.5 kg (106 lb 14.8 oz)  Energy Calorie Requirements (kcal): 9654-5974((30-35 kcal/kg))  Energy Need Method: Kcal/kg  Protein Requirements: 49-58g (1-1.2g/kg)  Weight Used For Protein Calculations: 48.5 kg (106 lb " 14.8 oz)     Estimated Fluid Requirement Method: RDA Method  RDA Method (mL): 1455         Nutrition Prescription Ordered    Current Diet Order: NPO    Evaluation of Received Nutrient/Fluid Intake    IV Fluid (mL): (IV meds noted)  I/O: reviewed  Energy Calories Required: not meeting needs  Protein Required: not meeting needs  Fluid Required: (per MD)  Comments: LBM 1/19  % Intake of Estimated Energy Needs: 0 - 25 %  % Meal Intake: NPO    Nutrition Risk    Level of Risk/Frequency of Follow-up: (2x/ wk)     Assessment and Plan    Nutrition Problem  Inadequate energy intake    Related to (etiology):   Medical workups    Signs and Symptoms (as evidenced by):   NPO status after midnight 1/23/2019 (initiated at 0042 1/24/19), <50% of EEN/EPN being met    Interventions/Recommendations (treatment strategy):  Collaboration of providers    Nutrition Diagnosis Status:   New         Monitor and Evaluation    Food and Nutrient Intake: energy intake, food and beverage intake  Food and Nutrient Adminstration: diet order  Knowledge/Beliefs/Attitudes: food and nutrition knowledge/skill, beliefs and attitudes  Anthropometric Measurements: weight, weight change, body mass index  Biochemical Data, Medical Tests and Procedures: electrolyte and renal panel, glucose/endocrine profile  Nutrition-Focused Physical Findings: overall appearance     Malnutrition Assessment             Hand  Strength, Left (Malnutrition): (Assessed without device,  strength was obviously reduced)   Orbital Region (Subcutaneous Fat Loss): mild depletion   Oriental orthodox Region (Muscle Loss): mild depletion  Clavicle Bone Region (Muscle Loss): well nourished  Dorsal Hand (Muscle Loss): moderate depletion  Patellar Region (Muscle Loss): severe depletion  Anterior Thigh Region (Muscle Loss): mild depletion  Posterior Calf Region (Muscle Loss): moderate depletion                 Nutrition Follow-Up    RD Follow-up?: Yes

## 2019-01-24 NOTE — PROGRESS NOTES
"[facility] Medicine  Progress Note    Patient Name: Jayden Marcial  MRN: [id number]  Patient Class: IP- Inpatient   Admission Date: 1/19/2019  Length of Stay: 5 days  Attending Physician: Victorina Zacarias MD  Primary Care Provider: Teddy Kohler MD        Subjective:     Principal Problem:AIDS    HPI:  Patient is a 43 year old F with a past medical history of HIV/AIDS, medication non-compliance, crack-cocaine use, anxiety, history of KANDY that presents with feeling "sick" for several days.  Patient not able to elaborate more at time of examination.  She has had symptoms of nausea, vomiting, abdominal pain, cough with mild sputum production.  She denies any fevers, shortness of breath, diarrhea, constipation, dizziness, lightheadedness, vision changes.  Patient states that she ran out of all her home medications about a week ago and that she has not had a chance to refill them as of yet.  Patient also endorses using crack-cocaine 2 days ago and began feeling worse after this.    Hospital Course:  Ms. Marcial was admitted with feeling "sick" in addition to underlying AIDS, non-compliance with meds and crack-cocaine use. She was placed in ICU due to uncontrollable behavior that required restraints and haldol injections. Her BP dropped briefly but did not require pressor support. Patient was calm, cooperative, out of restraints and stable for transfer to floor on 1/20. Of note, father at bedside said that patient was previously on hospice at [facility]. She was taken off hospice because they felt she was improving and patient decided to go back home to live with her father. He reported that she leaves the home and uses crack-cocaine. She missed last appt with PCP and did not get meds refilled. Pt was placed on empiric rocephin and azithromycin for pneumonia. Father feels dementia and psychosis worsening but also confounded by drug use. Pt with need of sitter for safety given constant need for redirection " while on the floor. Psych and ID consulted. ID stopped azithromycin and added bactrim given her CD4 count. There was also concern for cryptococcal meningitis (acute mental status change, skin lesions). Cryptococcal serum antigen has been ordered and high dose fluconazole for Cryptococcal coverage in the interim on 1/21. ID recommended LP with opening pressure to get crypto antigen csf, cell count, glucose, protein. Also screen for histoplasma, syphilis, and toxoplasma. IR consulted for LP. Attempted at LP aborted due to agitation.    Interval History: Pt with no acute events, patient has been calm and cooperative. Overnight nurse and am nurse reports she has been good. She reported that she has pain and would like some pain medication and also wanted to see her sister.     Review of Systems   Unable to perform ROS: Mental status change     Objective:     Vital Signs (Most Recent):  Temp: 98.6 °F (37 °C) (01/24/19 0728)  Pulse: 102 (01/24/19 0728)  Resp: 18 (01/24/19 0728)  BP: 123/73 (01/24/19 0728)  SpO2: 100 % (01/24/19 0728) Vital Signs (24h Range):  Temp:  [97.6 °F (36.4 °C)-100.4 °F (38 °C)] 98.6 °F (37 °C)  Pulse:  [] 102  Resp:  [17-20] 18  SpO2:  [91 %-100 %] 100 %  BP: (107-140)/(70-83) 123/73     Weight: 48.5 kg (106 lb 14.8 oz)  Body mass index is 20.2 kg/m².    Intake/Output Summary (Last 24 hours) at 1/24/2019 0821  Last data filed at 1/24/2019 0507  Gross per 24 hour   Intake 50 ml   Output --   Net 50 ml      Physical Exam   Constitutional: Vital signs are normal. She appears well-developed. No distress.   Chronically ill appearing, but appears much improved   HENT:   Head: Normocephalic.   Mouth/Throat: Mucous membranes are not dry.   Mucosal ulcerations improving   Cardiovascular: Normal rate and regular rhythm.   No murmur heard.  Pulmonary/Chest: Effort normal and breath sounds normal. No respiratory distress. She has no wheezes. She has no rales.   Abdominal: Soft. Normal appearance and  bowel sounds are normal. She exhibits no distension and no mass. There is no tenderness. There is no guarding.   Musculoskeletal: She exhibits no edema.   Neurological: She is alert.   Talking more and expressing her wish to see more family, calm and cooperative, oriented x 2 and becoming more appropriate.   Skin: Skin is warm. Capillary refill takes less than 2 seconds. She is not diaphoretic. No erythema.   Multiple macular dark skin lesions noted on back   Psychiatric: Her mood appears not anxious. Her speech is delayed. She is slowed. Cognition and memory are impaired. She is attentive.       Significant Labs: All pertinent labs within the past 24 hours have been reviewed.    Significant Imaging: I have reviewed and interpreted all pertinent imaging results/findings within the past 24 hours.    Assessment/Plan:      * AIDS    Admittedly non-compliant with medications at home  Truvada stopped and unknown ID physician in the outpatient setting  CD4 count 22  ID consult appreciated  Continued rocephin and stopped azithromycin  On IV bactrim and high dose fluconazole  Possible cryptococcal meningitis as cause for encephalopathy (acute mental status change, skin lesions).    Cryptococcal serum antigen negative  LP still needed given still with fever and AMS (although improving)  ID recommending the need for LP still at this time and antibiotics and antifungal treatment adjusted as per ID  IR consulted for LP with opening pressure delayed due to scheduling of room and again due to agitation of the patient  Will premedicate patient with zyprexa 10 mg IM now in prep for procedure, although she is very calm and cooperative today and have PRN medication if she become acutely agitated in the procedure suite  Will send for crypto antigen CSF, cell count, glucose, protein.  Also screen for histoplasma, syphilis, and toxoplasma.     Encephalopathy acute    As discussed above  Unclear baseline but with reported change  Slowly  improving and becoming more appropriate       Pneumonia due to infectious organism    Given her history of HIV and medication noncompliance  Pt was on treatment for possible PCP pneumonia, now changed for prophylaxis only  Continue Rocephin, Bactrim as per ID  DuoNeb breathing treatments q.6 hours  Sputum culture pending  Supplemental O2 as needed  AFB smears in progress  Appreciate ID input     Polysubstance abuse    Noted and reported by family  Mentation precludes counseling at this time  Will address when patient is more appropriate     Thrombocytopenia    No signs of bleeding at this time  Normalized platelets  Resolved     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 Corey  Avoiding all opiates and educated patient on multiple occasions that pain medications will not be given.     Tobacco abuse    Nicotine patch as needed.  Pt is not mentally appropriate for counseling at this time     Acute renal failure    Patient with baseline creatinine around 0.8 normally  On admit it was 3.3  Dehydration likely component to acute renal failure  IV fluids with normal saline at 125 cc an hour continuous  Resolved       VTE Risk Mitigation (From admission, onward)        Ordered     IP VTE LOW RISK PATIENT  Once      01/19/19 1118     Place JOHANN hose  Until discontinued      01/19/19 1118              Victorina Zacarias MD  Department of Hospital Medicine   [facility] [place]

## 2019-01-24 NOTE — PROCEDURES
Radiology Post-Procedure Note    Pre Op Diagnosis: Acute encephalopathy in setting of HIV with low CD4 concerning for CNS infection  Post Op Diagnosis: Same    Procedure: LP with opening pressure    Procedure performed by: Atilio Mckeon MD    Written Informed Consent Obtained: Yes  Specimen Removed: YES, 9-cc of clear CSF  Estimated Blood Loss: none    Findings:   1. Successful fluoroscopic guided LP with normal opening pressure and adequate samples obtained. No immediate complications. Patient tolerated procedure well.    Atilio Mckeon MD  Interventional Radiology

## 2019-01-24 NOTE — PROGRESS NOTES
Pt provided a bed bath and set up for oral care. Pt brush own teeth and assisted with bath. Tremors still present.

## 2019-01-24 NOTE — PLAN OF CARE
AMARJIT contacted number on chart for patient's father Kwadwo @ [phone] to discuss updated discharge plan. AMARJIT discovered number [phone] is actually the number for patient mother Morena so SW was on 3 way with mother Morena and father Kwadwo. SW inquired if patient will return home with them when medically stable to discharge from the hospital, both parties confirmed patient will live with them when patient discharges from the hospital. Mr. Burkett confirmed that he will help patient to get to her doctor appointments.         01/24/19 6548   Discharge Reassessment   Assessment Type Discharge Planning Reassessment   Provided patient/caregiver education on the expected discharge date and the discharge plan No   Do you have any problems affording any of your prescribed medications? No   Discharge Plan A Home with family  (ID F/U)   Discharge Plan B Home with family   DME Needed Upon Discharge  (TBD)   Patient choice form signed by patient/caregiver N/A   Anticipated Discharge Disposition Home   Can the patient answer the patient profile reliably? No, cognitively impaired   How does the patient rate their overall health at the present time? Poor   Describe the patient's ability to walk at the present time. Does not walk or unable to take any steps at all   How often would a person be available to care for the patient? Whenever needed

## 2019-01-24 NOTE — ASSESSMENT & PLAN NOTE
Admittedly non-compliant with medications at home  Truvada stopped and unknown ID physician in the outpatient setting  CD4 count 22  ID consult appreciated  Continued rocephin and stopped azithromycin  On IV bactrim and high dose fluconazole  Possible cryptococcal meningitis as cause for encephalopathy (acute mental status change, skin lesions).    Cryptococcal serum antigen negative  LP still needed given still with fever and AMS (although improving)  ID recommending the need for LP still at this time and antibiotics and antifungal treatment adjusted as per ID  IR consulted for LP with opening pressure delayed due to scheduling of room and again due to agitation of the patient  Will premedicate patient with zyprexa 10 mg IM now in prep for procedure, although she is very calm and cooperative today and have PRN medication if she become acutely agitated in the procedure suite  Will send for crypto antigen CSF, cell count, glucose, protein.  Also screen for histoplasma, syphilis, and toxoplasma.

## 2019-01-24 NOTE — PLAN OF CARE
Problem: Adult Inpatient Plan of Care  Goal: Plan of Care Review  Pt continues to have tremors. Alert and orient. No change in cardiac rhythm. Fever free. Speech clear. Accepts snacks. Appetite still decreased. Void frequently. No BM during shift. NPO since MN. Sx bath complete.

## 2019-01-24 NOTE — PLAN OF CARE
Problem: Adult Inpatient Plan of Care  Goal: Plan of Care Review  Outcome: Ongoing (interventions implemented as appropriate)  Pt has episodes of confusions and needs to be reoriented, slurred speech, and unsteady gait, incontinence care provided as needed, tolerating abx. Tremors still present. Attempted to have Lumbar puncture, was successful today. Verbalizes emotions through shift, safety maintained.

## 2019-01-24 NOTE — ASSESSMENT & PLAN NOTE
Patient was continued crack cocaine use  Counseled extensively on crack cocaine cessation given her continued chronic medical issues done by Dr. Corey  Avoiding all opiates and educated patient on multiple occasions that pain medications will not be given.

## 2019-01-25 LAB
ALBUMIN SERPL BCP-MCNC: 3 G/DL
ALP SERPL-CCNC: 78 U/L
ALT SERPL W/O P-5'-P-CCNC: 44 U/L
ANION GAP SERPL CALC-SCNC: 15 MMOL/L
AST SERPL-CCNC: 69 U/L
BASOPHILS # BLD AUTO: 0.01 K/UL
BASOPHILS NFR BLD: 0.2 %
BILIRUB SERPL-MCNC: 0.2 MG/DL
BUN SERPL-MCNC: 15 MG/DL
CALCIUM SERPL-MCNC: 9.4 MG/DL
CHLORIDE SERPL-SCNC: 104 MMOL/L
CO2 SERPL-SCNC: 17 MMOL/L
CREAT SERPL-MCNC: 1.1 MG/DL
CRYPTOC AG CSF QL LA: NEGATIVE
DIFFERENTIAL METHOD: ABNORMAL
EOSINOPHIL # BLD AUTO: 0 K/UL
EOSINOPHIL NFR BLD: 0.8 %
ERYTHROCYTE [DISTWIDTH] IN BLOOD BY AUTOMATED COUNT: 16.4 %
EST. GFR  (AFRICAN AMERICAN): >60 ML/MIN/1.73 M^2
EST. GFR  (NON AFRICAN AMERICAN): >60 ML/MIN/1.73 M^2
GLUCOSE SERPL-MCNC: 27 MG/DL
HCT VFR BLD AUTO: 32.8 %
HGB BLD-MCNC: 10.8 G/DL
HISTOPLASMA AG VALUE: 0.06 NG/ML
HISTOPLASMA AG VALUE: 0.06 NG/ML
HISTOPLASMA ANTIGEN URINE: NEGATIVE
HISTOPLASMA ANTIGEN URINE: NEGATIVE
LYMPHOCYTES # BLD AUTO: 1 K/UL
LYMPHOCYTES NFR BLD: 20.3 %
MAGNESIUM SERPL-MCNC: 2.1 MG/DL
MCH RBC QN AUTO: 29.5 PG
MCHC RBC AUTO-ENTMCNC: 32.9 G/DL
MCV RBC AUTO: 90 FL
MONOCYTES # BLD AUTO: 0.7 K/UL
MONOCYTES NFR BLD: 13.7 %
NEUTROPHILS # BLD AUTO: 3.1 K/UL
NEUTROPHILS NFR BLD: 65 %
PHOSPHATE SERPL-MCNC: 3.4 MG/DL
PLATELET # BLD AUTO: 192 K/UL
PMV BLD AUTO: 10.6 FL
POCT GLUCOSE: 168 MG/DL (ref 70–110)
POTASSIUM SERPL-SCNC: 4 MMOL/L
PROT SERPL-MCNC: 8.8 G/DL
RBC # BLD AUTO: 3.66 M/UL
SODIUM SERPL-SCNC: 136 MMOL/L
WBC # BLD AUTO: 4.83 K/UL

## 2019-01-25 PROCEDURE — 11000001 HC ACUTE MED/SURG PRIVATE ROOM

## 2019-01-25 PROCEDURE — 86480 TB TEST CELL IMMUN MEASURE: CPT

## 2019-01-25 PROCEDURE — 63600175 PHARM REV CODE 636 W HCPCS: Performed by: PHYSICIAN ASSISTANT

## 2019-01-25 PROCEDURE — S4991 NICOTINE PATCH NONLEGEND: HCPCS | Performed by: EMERGENCY MEDICINE

## 2019-01-25 PROCEDURE — 25000003 PHARM REV CODE 250: Performed by: HOSPITALIST

## 2019-01-25 PROCEDURE — 25000003 PHARM REV CODE 250: Performed by: PHYSICIAN ASSISTANT

## 2019-01-25 PROCEDURE — 25000003 PHARM REV CODE 250: Performed by: EMERGENCY MEDICINE

## 2019-01-25 PROCEDURE — 36415 COLL VENOUS BLD VENIPUNCTURE: CPT

## 2019-01-25 PROCEDURE — 85025 COMPLETE CBC W/AUTO DIFF WBC: CPT

## 2019-01-25 PROCEDURE — 63600175 PHARM REV CODE 636 W HCPCS: Performed by: EMERGENCY MEDICINE

## 2019-01-25 PROCEDURE — 84100 ASSAY OF PHOSPHORUS: CPT

## 2019-01-25 PROCEDURE — 83735 ASSAY OF MAGNESIUM: CPT

## 2019-01-25 PROCEDURE — 99233 SBSQ HOSP IP/OBS HIGH 50: CPT | Mod: ,,, | Performed by: PHYSICIAN ASSISTANT

## 2019-01-25 PROCEDURE — 80053 COMPREHEN METABOLIC PANEL: CPT

## 2019-01-25 PROCEDURE — 99233 PR SUBSEQUENT HOSPITAL CARE,LEVL III: ICD-10-PCS | Mod: ,,, | Performed by: PHYSICIAN ASSISTANT

## 2019-01-25 RX ORDER — ACETAMINOPHEN 325 MG/1
650 TABLET ORAL EVERY 4 HOURS PRN
Status: DISCONTINUED | OUTPATIENT
Start: 2019-01-25 | End: 2019-01-27 | Stop reason: HOSPADM

## 2019-01-25 RX ORDER — KETOROLAC TROMETHAMINE 10 MG/1
10 TABLET, FILM COATED ORAL EVERY 8 HOURS PRN
Status: DISCONTINUED | OUTPATIENT
Start: 2019-01-25 | End: 2019-01-27 | Stop reason: HOSPADM

## 2019-01-25 RX ADMIN — HYDROXYCHLOROQUINE SULFATE 200 MG: 200 TABLET, FILM COATED ORAL at 08:01

## 2019-01-25 RX ADMIN — SENNOSIDES AND DOCUSATE SODIUM 1 TABLET: 8.6; 5 TABLET ORAL at 08:01

## 2019-01-25 RX ADMIN — MEGESTROL ACETATE 40 MG: 20 TABLET ORAL at 08:01

## 2019-01-25 RX ADMIN — BUSPIRONE HYDROCHLORIDE 10 MG: 10 TABLET ORAL at 03:01

## 2019-01-25 RX ADMIN — HYDROXYZINE HYDROCHLORIDE 25 MG: 25 TABLET, FILM COATED ORAL at 08:01

## 2019-01-25 RX ADMIN — ACYCLOVIR 400 MG: 200 CAPSULE ORAL at 08:01

## 2019-01-25 RX ADMIN — FLUCONAZOLE, SODIUM CHLORIDE 400 MG: 2 INJECTION INTRAVENOUS at 05:01

## 2019-01-25 RX ADMIN — BUSPIRONE HYDROCHLORIDE 10 MG: 10 TABLET ORAL at 08:01

## 2019-01-25 RX ADMIN — PANCRELIPASE LIPASE, PANCRELIPASE PROTEASE, PANCRELIPASE AMYLASE 1 CAPSULE: 5000; 17000; 24000 CAPSULE, DELAYED RELEASE ORAL at 08:01

## 2019-01-25 RX ADMIN — KETOROLAC TROMETHAMINE 10 MG: 10 TABLET, FILM COATED ORAL at 05:01

## 2019-01-25 RX ADMIN — CITALOPRAM HYDROBROMIDE 20 MG: 20 TABLET ORAL at 08:01

## 2019-01-25 RX ADMIN — CEFTRIAXONE 1 G: 1 INJECTION, SOLUTION INTRAVENOUS at 10:01

## 2019-01-25 RX ADMIN — NICOTINE 1 PATCH: 21 PATCH, EXTENDED RELEASE TRANSDERMAL at 08:01

## 2019-01-25 RX ADMIN — MONTELUKAST SODIUM 10 MG: 10 TABLET, FILM COATED ORAL at 08:01

## 2019-01-25 RX ADMIN — PANTOPRAZOLE SODIUM 40 MG: 40 TABLET, DELAYED RELEASE ORAL at 08:01

## 2019-01-25 RX ADMIN — SULFAMETHOXAZOLE AND TRIMETHOPRIM 1 TABLET: 800; 160 TABLET ORAL at 08:01

## 2019-01-25 RX ADMIN — QUETIAPINE FUMARATE 25 MG: 25 TABLET ORAL at 08:01

## 2019-01-25 RX ADMIN — CEFTRIAXONE 1 G: 1 INJECTION, SOLUTION INTRAVENOUS at 12:01

## 2019-01-25 RX ADMIN — HYDROXYZINE HYDROCHLORIDE 25 MG: 25 TABLET, FILM COATED ORAL at 03:01

## 2019-01-25 RX ADMIN — ACETAMINOPHEN 650 MG: 325 TABLET ORAL at 08:01

## 2019-01-25 RX ADMIN — ACYCLOVIR 400 MG: 200 CAPSULE ORAL at 03:01

## 2019-01-25 RX ADMIN — PANCRELIPASE LIPASE, PANCRELIPASE PROTEASE, PANCRELIPASE AMYLASE 1 CAPSULE: 5000; 17000; 24000 CAPSULE, DELAYED RELEASE ORAL at 05:01

## 2019-01-25 RX ADMIN — PANCRELIPASE LIPASE, PANCRELIPASE PROTEASE, PANCRELIPASE AMYLASE 1 CAPSULE: 5000; 17000; 24000 CAPSULE, DELAYED RELEASE ORAL at 12:01

## 2019-01-25 NOTE — PROGRESS NOTES
[facility]  Infectious Disease  Progress Note    Patient Name: Jayden Marcial  MRN: [id number]  Admission Date: 1/19/2019  Length of Stay: 6 days  Attending Physician: Mohsen Davila MD  Primary Care Provider: [facility]    Isolation Status: No active isolations  Assessment/Plan:      * AIDS       43 year old female with AIDS (CD4 22/2.9%, VL 202K) admitted with acute worsening of mental status, chronic cough, and burning with swallow. Patient's father in law at bedside and is aware of AIDS diagnosis. Said that pt was in hospice, but this was reversed and is now living with him. She has apparently been having increasing paranoid behavior, but over past few days has become more quiet and nonverbal and confused. He said that she has been taking her HIV meds daily. Spoke with her [facility] pharmacy outpatient and she has been filling Truvada monotherapy as prescribed by Dr Kohler. Denies that she has outpatient ID physician, but is interested in establishing care at [facility] on [address].      Patient initially nonverbal to questioning with clonus present. Since then her mentation has improved and she reported cough, neck stiffness, headache, blurry left sided vision, mouth pain and rash. No hypoxia.      Work up: CT/MRI head, no acute findings. CXR mild interstitial edema vs pneumonia.      Given AIDS/low CD4, c/f a number of opportunistic infections. Primary admission problem was AMS. Has been on empiric Ceftriaxone, Acyclovir, Bactrim and Fluconazole. LP 1/24 revealed normal opening pressure, 2 WBC, 235 RBC, glucose 32 , protein 29. CSF cultures pending. Cryptococcal meningitis unlikely based on studies. RPR/Toxo IgG/Fungitell negative. Also screening for histoplasma. Fungal immunodiffusion ordered.     Infiltrate on CXR could be fungal process (work up above) or known NTM. Not classic presentation of PCP and patient sating well on room air. Checking pcp smear.  "Would also check AFB smear x3 and 2 M.TB PCR sputum for TB rule out (and to see if NTM continues to grow). Check afb bcx for dMAC. Needs Bactrim 3x a week for PCP prophylaxis. No clear signs of bacterial pneumonia, resp culture pending. Can continue ceftriaxone x 7 days total. Would also continue fluconazole 400 mg PO once daily x 14 days for possible candida esophagitis f/b supression with fluconazole 200 mg PO 3x/weekly. Complete course of acyclovir for herpes labialis.      If infectious w/u negative, AMS could be simply explained by HIV encephalopathy. She is much more alert today and appears to be clinically improving on current therapy. Patient's ultimate prognosis depends on HARRT. Would not start now given risk of IRIS. Have sent genotype. Would coordinate f/u at [facility] after discharge.          ID will sign off at this time. Please call or feel free to re-consult ID for any questions or concerns. Thank you.  Sultana Quezada PA-C  Phone: [phone]  Pager: [phone]      Subjective:     Principal Problem:AIDS    HPI: Patient is a 43 year old Female with a past medical history of AIDS (most recently on outpatient Truvada), medication non-compliance, crack-cocaine use history of KANDY (incompletely treated) that presents with feeling "sick" for several days.  Patient not able to elaborate more at time of examination. Her step dad provides history. She has had symptoms of nausea, vomiting, abdominal pain, SOB, cough with mild sputum production. Her mental status has also declined. Symptoms started right after Juan C and he mentions patient drinking alcohol more frequently recently. Per chat review, patient ran out of all her home medications about a week ago and that she has not had a chance to refill them as of yet.    The patient was previously on hospice at [facility]. She was taken off hospice because they felt she was improving and patient decided to go back home to live with father. Most recently missed her " PCP appt with Dr. Kohler. He prescribed her Truvada as an outpatient. Patient was started on rocephin and azithromycin for pneumonia. Now on Ceftriaxone and Bactrim. CD4 22, 2.9%. Flu, blood and urine cultures negative. Father feels dementia and psychosis worsening but also confounded by drug use. ID consulted for abx recs.        Interval History: Patient seen at bedside eating breakfast. Mentation continues to improve. Answering to questions. Reports overall feeling better. Denies current headache. Blurry vision and mouth pain improving. Still has aches and asking for pain medication.     Review of Systems   Constitutional: Positive for chills. Negative for fever.   HENT: Positive for mouth sores and sore throat.         Mouth pain   Eyes: Positive for visual disturbance.   Respiratory: Positive for cough and shortness of breath.    Cardiovascular: Negative for chest pain.   Gastrointestinal: Negative for abdominal pain and diarrhea.   Genitourinary: Negative for difficulty urinating.   Musculoskeletal: Positive for arthralgias and neck stiffness.   Skin: Positive for rash. Negative for wound.   Neurological: Positive for weakness. Negative for headaches.   Psychiatric/Behavioral: The patient is not nervous/anxious.    All other systems reviewed and are negative.    Objective:     Vital Signs (Most Recent):  Temp: 98.5 °F (36.9 °C) (01/25/19 0746)  Pulse: 95 (01/25/19 0746)  Resp: 19 (01/25/19 0746)  BP: 101/61 (01/25/19 0746)  SpO2: (!) 91 % (01/25/19 0746) Vital Signs (24h Range):  Temp:  [97.8 °F (36.6 °C)-98.7 °F (37.1 °C)] 98.5 °F (36.9 °C)  Pulse:  [90-95] 95  Resp:  [17-19] 19  SpO2:  [91 %-100 %] 91 %  BP: (100-132)/(61-74) 101/61     Weight: 48.5 kg (106 lb 14.8 oz)  Body mass index is 20.2 kg/m².    Estimated Creatinine Clearance: 48.7 mL/min (based on SCr of 1.1 mg/dL).    Physical Exam   Constitutional: Vital signs are normal. She has a sickly appearance. No distress.   Thin appearing   HENT:   Head:  Normocephalic and atraumatic.   Mouth/Throat: Mucous membranes are dry.   Limited mouth exam as patient unable to open her mouth wide enough to examine.   Neck:   Decreased ROM of neck   Cardiovascular: Normal rate and regular rhythm.   No murmur heard.  Pulmonary/Chest: Effort normal. She has no wheezes. She has rales in the right lower field.   Abdominal: Soft. Normal appearance and bowel sounds are normal. She exhibits no distension. There is no tenderness.   Musculoskeletal: She exhibits no edema.   Neurological: She is alert.   Mentation much improved today. Answering to questions. Eating breakfast by herself.    Skin: Skin is warm and dry. No erythema.   Multiple darkened macular skin lesions prominently on back.       Significant Labs: All pertinent labs within the past 24 hours have been reviewed.    Significant Imaging: I have reviewed all pertinent imaging results/findings within the past 24 hours.

## 2019-01-25 NOTE — ASSESSMENT & PLAN NOTE
Unclear baseline but progressive and severe  Appreciate Psych input  Mentation improving and probably close to baseline.

## 2019-01-25 NOTE — PLAN OF CARE
Problem: Adult Inpatient Plan of Care  Goal: Plan of Care Review  Outcome: Ongoing (interventions implemented as appropriate)  AAOx4; calm and cooperative. No falls no injuries. No complaints of n/v. VS remained stable; pt. remained sleep through most of the shift. No complaints of pain/comfort. Pt. on continous cardiac monitoring; tele box #[id number]. Scheduled meds given without difficulty. Pt. instructed to call if assistance is needed. Bed in lowest position; side rails x3. Call light in reach. Will continue to monitor.      01/25/19 2303   Plan of Care Review   Plan of Care Reviewed With patient

## 2019-01-25 NOTE — SUBJECTIVE & OBJECTIVE
Interval History: Patient seen at bedside eating breakfast. Mentation continues to improve. Answering to questions. Reports overall feeling better. Denies current headache. Blurry vision and mouth pain improving. Still has aches and asking for pain medication.     Review of Systems   Constitutional: Positive for chills. Negative for fever.   HENT: Positive for mouth sores and sore throat.         Mouth pain   Eyes: Positive for visual disturbance.   Respiratory: Positive for cough and shortness of breath.    Cardiovascular: Negative for chest pain.   Gastrointestinal: Negative for abdominal pain and diarrhea.   Genitourinary: Negative for difficulty urinating.   Musculoskeletal: Positive for arthralgias and neck stiffness.   Skin: Positive for rash. Negative for wound.   Neurological: Positive for weakness. Negative for headaches.   Psychiatric/Behavioral: The patient is not nervous/anxious.    All other systems reviewed and are negative.    Objective:     Vital Signs (Most Recent):  Temp: 98.5 °F (36.9 °C) (01/25/19 0746)  Pulse: 95 (01/25/19 0746)  Resp: 19 (01/25/19 0746)  BP: 101/61 (01/25/19 0746)  SpO2: (!) 91 % (01/25/19 0746) Vital Signs (24h Range):  Temp:  [97.8 °F (36.6 °C)-98.7 °F (37.1 °C)] 98.5 °F (36.9 °C)  Pulse:  [90-95] 95  Resp:  [17-19] 19  SpO2:  [91 %-100 %] 91 %  BP: (100-132)/(61-74) 101/61     Weight: 48.5 kg (106 lb 14.8 oz)  Body mass index is 20.2 kg/m².    Estimated Creatinine Clearance: 48.7 mL/min (based on SCr of 1.1 mg/dL).    Physical Exam   Constitutional: Vital signs are normal. She has a sickly appearance. No distress.   Thin appearing   HENT:   Head: Normocephalic and atraumatic.   Mouth/Throat: Mucous membranes are dry.   Limited mouth exam as patient unable to open her mouth wide enough to examine.   Neck:   Decreased ROM of neck   Cardiovascular: Normal rate and regular rhythm.   No murmur heard.  Pulmonary/Chest: Effort normal. She has no wheezes. She has rales in the right  lower field.   Abdominal: Soft. Normal appearance and bowel sounds are normal. She exhibits no distension. There is no tenderness.   Musculoskeletal: She exhibits no edema.   Neurological: She is alert.   Mentation much improved today. Answering to questions. Eating breakfast by herself.    Skin: Skin is warm and dry. No erythema.   Multiple darkened macular skin lesions prominently on back.       Significant Labs: All pertinent labs within the past 24 hours have been reviewed.    Significant Imaging: I have reviewed all pertinent imaging results/findings within the past 24 hours.

## 2019-01-25 NOTE — PROGRESS NOTES
"[facility]  Progress Note    Patient Name: Jayden Marcial  MRN: [id number]  Patient Class: IP- Inpatient   Admission Date: 1/19/2019  Length of Stay: 6 days  Attending Physician: Mohsen Davila MD  Primary Care Provider: [facility]        Subjective:     Principal Problem:AIDS    HPI:  Patient is a 43 year old F with a past medical history of HIV/AIDS, medication non-compliance, crack-cocaine use, anxiety, history of KANDY that presents with feeling "sick" for several days.  Patient not able to elaborate more at time of examination.  She has had symptoms of nausea, vomiting, abdominal pain, cough with mild sputum production.  She denies any fevers, shortness of breath, diarrhea, constipation, dizziness, lightheadedness, vision changes.  Patient states that she ran out of all her home medications about a week ago and that she has not had a chance to refill them as of yet.  Patient also endorses using crack-cocaine 2 days ago and began feeling worse after this.    Hospital Course:  Ms. Marcial was admitted with feeling "sick" in addition to underlying AIDS, non-compliance with meds and crack-cocaine use. She was placed in ICU due to uncontrollable behavior that required restraints and haldol injections. Her BP dropped briefly but did not require pressor support. Patient was calm, cooperative, out of restraints and stable for transfer to floor on 1/20. Of note, father at bedside said that patient was previously on hospice at [facility]. She was taken off hospice because they felt she was improving and patient decided to go back home to live with her father. He reported that she leaves the home and uses crack-cocaine. She missed last appt with PCP and did not get meds refilled. Pt was placed on empiric rocephin and azithromycin for pneumonia. Father feels dementia and psychosis worsening but also confounded by drug use. Pt with need of sitter for safety given constant " "need for redirection while on the floor. Psych and ID consulted. ID stopped azithromycin and added bactrim given her CD4 count. There was also concern for cryptococcal meningitis (acute mental status change, skin lesions). Cryptococcal serum antigen has been ordered and high dose fluconazole for Cryptococcal coverage in the interim on 1/21. ID recommended LP with opening pressure to get crypto antigen csf, cell count, glucose, protein. Also screen for histoplasma, syphilis, and toxoplasma. IR consulted for LP.  LP performed on 1/24 with normal opening pressure.    Interval History: Complaining of "pain all over".    Review of Systems   HENT: Negative for ear discharge and ear pain.    Eyes: Negative for pain and itching.   Endocrine: Negative for polyphagia and polyuria.   Neurological: Negative for seizures and syncope.     Objective:     Vital Signs (Most Recent):  Temp: 99.1 °F (37.3 °C) (01/25/19 1139)  Pulse: 88 (01/25/19 1139)  Resp: 18 (01/25/19 1139)  BP: 107/68 (01/25/19 1139)  SpO2: 97 % (01/25/19 1139) Vital Signs (24h Range):  Temp:  [97.8 °F (36.6 °C)-99.1 °F (37.3 °C)] 99.1 °F (37.3 °C)  Pulse:  [88-95] 88  Resp:  [17-19] 18  SpO2:  [91 %-100 %] 97 %  BP: (100-132)/(61-74) 107/68     Weight: 48.5 kg (106 lb 14.8 oz)  Body mass index is 20.2 kg/m².    Intake/Output Summary (Last 24 hours) at 1/25/2019 1434  Last data filed at 1/25/2019 0825  Gross per 24 hour   Intake 240 ml   Output --   Net 240 ml      Physical Exam   Constitutional: Vital signs are normal. She appears well-developed. No distress.   Chronically ill appearing, but appears much improved   HENT:   Head: Normocephalic.   Mouth/Throat: Mucous membranes are not dry.   Mucosal ulcerations improving   Cardiovascular: Normal rate and regular rhythm.   No murmur heard.  Pulmonary/Chest: Effort normal and breath sounds normal. No respiratory distress. She has no wheezes. She has no rales.   Abdominal: Soft. Normal appearance and bowel sounds are " normal. She exhibits no distension and no mass. There is no tenderness. There is no guarding.   Musculoskeletal: She exhibits no edema.   Neurological: She is alert.   Talking more, calm and cooperative, oriented x 3   Skin: Skin is warm. Capillary refill takes less than 2 seconds. She is not diaphoretic. No erythema.   Multiple macular dark skin lesions noted on back   Psychiatric: Her mood appears not anxious. Her speech is delayed. She is slowed. Cognition and memory are impaired. She is attentive.       Significant Labs:   BMP:   Recent Labs   Lab 01/25/19  0442   GLU 27*      K 4.0      CO2 17*   BUN 15   CREATININE 1.1   CALCIUM 9.4   MG 2.1     CBC:   Recent Labs   Lab 01/24/19  0402 01/25/19  0442   WBC 2.84* 4.83   HGB 10.1* 10.8*   HCT 28.9* 32.8*    192         Assessment/Plan:      * AIDS    Admittedly non-compliant with medications at home  Truvada stopped and unknown ID physician in the outpatient setting  CD4 count 22  ID consult appreciated  Continued rocephin and stopped azithromycin  On IV bactrim and high dose fluconazole  Possible cryptococcal meningitis as cause for encephalopathy (acute mental status change, skin lesions).    Cryptococcal serum antigen negative  LP still needed given still with fever and AMS (although improving)  ID recommending the need for LP still at this time and antibiotics and antifungal treatment adjusted as per ID  IR consulted for LP with opening pressure delayed due to scheduling of room and again due to agitation of the patient  Will premedicate patient with zyprexa 10 mg IM now in prep for procedure, although she is very calm and cooperative today and have PRN medication if she become acutely agitated in the procedure suite  LP performed on 1/24 with normal opening pressure.  Work up mostly unremarkable.  Appreciate ID input.  Changes in mental status possibly secondary to HIV encephalopathy.  Mentation improving.  Stop Rocephin after today per  ID.  Can probably go home over the weekend.     Encephalopathy acute    As discussed above  Unclear baseline but with reported change  Slowly improving and becoming more appropriate       Polysubstance abuse    Noted and reported by family       Thrombocytopenia    No signs of bleeding at this time  Normalized platelets  Resolved     Pneumonia due to infectious organism    Given her history of HIV and medication noncompliance  Pt was on treatment for possible PCP pneumonia, now changed for prophylaxis only  Continue Rocephin, Bactrim as per ID  DuoNeb breathing treatments q.6 hours  Sputum culture pending  Supplemental O2 as needed  AFB smears in progress  Appreciate ID input  Stop Rocephin after today.     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       Cocaine abuse    Patient was continued crack cocaine use  Counseled extensively on crack cocaine cessation given her continued chronic medical issues done by Dr. Corey  Avoiding all opiates and educated patient on multiple occasions that pain medications will not be given.     Tobacco abuse    Nicotine patch as needed.       Acute renal failure    Patient with baseline creatinine around 0.8 normally  On admit it was 3.3  Dehydration likely component to acute renal failure  IV fluids with normal saline at 125 cc an hour continuous  Resolved       VTE Risk Mitigation (From admission, onward)        Ordered     IP VTE LOW RISK PATIENT  Once      01/19/19 1118     Place JOHANN hose  Until discontinued      01/19/19 1118              Mohsen Davila MD  Department of Hospital Medicine   [facility]

## 2019-01-25 NOTE — SUBJECTIVE & OBJECTIVE
"Interval History: Complaining of "pain all over".    Review of Systems   HENT: Negative for ear discharge and ear pain.    Eyes: Negative for pain and itching.   Endocrine: Negative for polyphagia and polyuria.   Neurological: Negative for seizures and syncope.     Objective:     Vital Signs (Most Recent):  Temp: 99.1 °F (37.3 °C) (01/25/19 1139)  Pulse: 88 (01/25/19 1139)  Resp: 18 (01/25/19 1139)  BP: 107/68 (01/25/19 1139)  SpO2: 97 % (01/25/19 1139) Vital Signs (24h Range):  Temp:  [97.8 °F (36.6 °C)-99.1 °F (37.3 °C)] 99.1 °F (37.3 °C)  Pulse:  [88-95] 88  Resp:  [17-19] 18  SpO2:  [91 %-100 %] 97 %  BP: (100-132)/(61-74) 107/68     Weight: 48.5 kg (106 lb 14.8 oz)  Body mass index is 20.2 kg/m².    Intake/Output Summary (Last 24 hours) at 1/25/2019 1434  Last data filed at 1/25/2019 0825  Gross per 24 hour   Intake 240 ml   Output --   Net 240 ml      Physical Exam   Constitutional: Vital signs are normal. She appears well-developed. No distress.   Chronically ill appearing, but appears much improved   HENT:   Head: Normocephalic.   Mouth/Throat: Mucous membranes are not dry.   Mucosal ulcerations improving   Cardiovascular: Normal rate and regular rhythm.   No murmur heard.  Pulmonary/Chest: Effort normal and breath sounds normal. No respiratory distress. She has no wheezes. She has no rales.   Abdominal: Soft. Normal appearance and bowel sounds are normal. She exhibits no distension and no mass. There is no tenderness. There is no guarding.   Musculoskeletal: She exhibits no edema.   Neurological: She is alert.   Talking more, calm and cooperative, oriented x 3   Skin: Skin is warm. Capillary refill takes less than 2 seconds. She is not diaphoretic. No erythema.   Multiple macular dark skin lesions noted on back   Psychiatric: Her mood appears not anxious. Her speech is delayed. She is slowed. Cognition and memory are impaired. She is attentive.       Significant Labs:   BMP:   Recent Labs   Lab " 01/25/19  0442   GLU 27*      K 4.0      CO2 17*   BUN 15   CREATININE 1.1   CALCIUM 9.4   MG 2.1     CBC:   Recent Labs   Lab 01/24/19  0402 01/25/19 0442   WBC 2.84* 4.83   HGB 10.1* 10.8*   HCT 28.9* 32.8*    192

## 2019-01-25 NOTE — ASSESSMENT & PLAN NOTE
Admittedly non-compliant with medications at home  Truvada stopped and unknown ID physician in the outpatient setting  CD4 count 22  ID consult appreciated  Continued rocephin and stopped azithromycin  On IV bactrim and high dose fluconazole  Possible cryptococcal meningitis as cause for encephalopathy (acute mental status change, skin lesions).    Cryptococcal serum antigen negative  LP still needed given still with fever and AMS (although improving)  ID recommending the need for LP still at this time and antibiotics and antifungal treatment adjusted as per ID  IR consulted for LP with opening pressure delayed due to scheduling of room and again due to agitation of the patient  Will premedicate patient with zyprexa 10 mg IM now in prep for procedure, although she is very calm and cooperative today and have PRN medication if she become acutely agitated in the procedure suite  LP performed on 1/24 with normal opening pressure.  Work up mostly unremarkable.  Appreciate ID input.  Changes in mental status possibly secondary to HIV encephalopathy.  Mentation improving.  Stop Rocephin after today per ID.  Can probably go home over the weekend.

## 2019-01-25 NOTE — PLAN OF CARE
"Problem: Adult Inpatient Plan of Care  Goal: Plan of Care Review  Outcome: Ongoing (interventions implemented as appropriate)   01/25/19 4010   Plan of Care Review   Plan of Care Reviewed With patient     Patient remained free from falls, trauma, and injuries throughout shift. No complaints of nausea or vomiting. Pain controlled with prn analgesics. IV antibiotics and medications administered as prescribed. [employer] at bedside; alarms audible. Patient has poor appetite, often stating "I will eat later." Telemetry monitor on and alarms audible. Father at bedside periodically throughout shift; very supportive in care.  No acute distress noted.      "

## 2019-01-25 NOTE — ASSESSMENT & PLAN NOTE
43 year old female with AIDS (CD4 22/2.9%, VL 202K) admitted with acute worsening of mental status, chronic cough, and burning with swallow. Patient's father in law at bedside and is aware of AIDS diagnosis. Said that pt was in hospice, but this was reversed and is now living with him. She has apparently been having increasing paranoid behavior, but over past few days has become more quiet and nonverbal and confused. He said that she has been taking her HIV meds daily. Spoke with her [facility] pharmacy outpatient and she has been filling Truvada monotherapy as prescribed by Dr Kohler. Denies that she has outpatient ID physician, but is interested in establishing care at [facility] on [address].      Patient initially nonverbal to questioning with clonus present. Since then her mentation has improved and she reported cough, neck stiffness, headache, blurry left sided vision, mouth pain and rash. No hypoxia.      Work up: CT/MRI head, no acute findings. CXR mild interstitial edema vs pneumonia.      Given AIDS/low CD4, c/f a number of opportunistic infections. Primary admission problem was AMS. Has been on empiric Ceftriaxone, Acyclovir, Bactrim and Fluconazole. LP 1/24 revealed normal opening pressure, 2 WBC, 235 RBC, glucose 32 , protein 29. CSF cultures pending. Cryptococcal meningitis unlikely based on studies. RPR/Toxo IgG/Fungitell negative. Also screening for histoplasma. Fungal immunodiffusion ordered.     Infiltrate on CXR could be fungal process (work up above) or known NTM. Not classic presentation of PCP and patient sating well on room air. Checking pcp smear. Would also check AFB smear x3 and 2 M.TB PCR sputum for TB rule out (and to see if NTM continues to grow). Check afb bcx for dMAC. Needs Bactrim 3x a week for PCP prophylaxis. No clear signs of bacterial pneumonia, resp culture pending. Can continue ceftriaxone x 7 days total. Would also continue fluconazole 400 mg PO once daily x 14 days  for possible candida esophagitis f/b supression with fluconazole 200 mg PO 3x/weekly. Complete course of acyclovir for herpes labialis.      If infectious w/u negative, AMS could be simply explained by HIV encephalopathy. She is much more alert today and appears to be clinically improving on current therapy. Patient's ultimate prognosis depends on HARRT. Would not start now given risk of IRIS. Have sent genotype. Would coordinate f/u at [facility] after discharge.

## 2019-01-25 NOTE — ASSESSMENT & PLAN NOTE
Given her history of HIV and medication noncompliance  Pt was on treatment for possible PCP pneumonia, now changed for prophylaxis only  Continue Rocephin, Bactrim as per ID  DuoNeb breathing treatments q.6 hours  Sputum culture pending  Supplemental O2 as needed  AFB smears in progress  Appreciate ID input  Stop Rocephin after today.

## 2019-01-26 LAB
ALBUMIN SERPL BCP-MCNC: 2.9 G/DL
ALP SERPL-CCNC: 71 U/L
ALT SERPL W/O P-5'-P-CCNC: 43 U/L
ANION GAP SERPL CALC-SCNC: 7 MMOL/L
AST SERPL-CCNC: 57 U/L
BASOPHILS # BLD AUTO: 0.01 K/UL
BASOPHILS NFR BLD: 0.2 %
BILIRUB SERPL-MCNC: 0.3 MG/DL
BUN SERPL-MCNC: 17 MG/DL
CALCIUM SERPL-MCNC: 9.5 MG/DL
CHLORIDE SERPL-SCNC: 107 MMOL/L
CO2 SERPL-SCNC: 22 MMOL/L
CREAT SERPL-MCNC: 1.1 MG/DL
DIFFERENTIAL METHOD: ABNORMAL
EOSINOPHIL # BLD AUTO: 0.1 K/UL
EOSINOPHIL NFR BLD: 1.4 %
ERYTHROCYTE [DISTWIDTH] IN BLOOD BY AUTOMATED COUNT: 16.5 %
EST. GFR  (AFRICAN AMERICAN): >60 ML/MIN/1.73 M^2
EST. GFR  (NON AFRICAN AMERICAN): >60 ML/MIN/1.73 M^2
GLUCOSE SERPL-MCNC: 73 MG/DL
HCT VFR BLD AUTO: 31.1 %
HGB BLD-MCNC: 10.4 G/DL
LYMPHOCYTES # BLD AUTO: 0.5 K/UL
LYMPHOCYTES NFR BLD: 11.9 %
MAGNESIUM SERPL-MCNC: 1.8 MG/DL
MCH RBC QN AUTO: 29.8 PG
MCHC RBC AUTO-ENTMCNC: 33.4 G/DL
MCV RBC AUTO: 89 FL
MONOCYTES # BLD AUTO: 0.5 K/UL
MONOCYTES NFR BLD: 11.2 %
NEUTROPHILS # BLD AUTO: 3.3 K/UL
NEUTROPHILS NFR BLD: 75.3 %
PHOSPHATE SERPL-MCNC: 3.2 MG/DL
PLATELET # BLD AUTO: 249 K/UL
PMV BLD AUTO: 10.5 FL
POTASSIUM SERPL-SCNC: 3.8 MMOL/L
PROT SERPL-MCNC: 8.6 G/DL
RBC # BLD AUTO: 3.49 M/UL
SODIUM SERPL-SCNC: 136 MMOL/L
WBC # BLD AUTO: 4.37 K/UL

## 2019-01-26 PROCEDURE — 85025 COMPLETE CBC W/AUTO DIFF WBC: CPT

## 2019-01-26 PROCEDURE — S4991 NICOTINE PATCH NONLEGEND: HCPCS | Performed by: EMERGENCY MEDICINE

## 2019-01-26 PROCEDURE — 25000003 PHARM REV CODE 250: Performed by: EMERGENCY MEDICINE

## 2019-01-26 PROCEDURE — 25000003 PHARM REV CODE 250: Performed by: HOSPITALIST

## 2019-01-26 PROCEDURE — 80053 COMPREHEN METABOLIC PANEL: CPT

## 2019-01-26 PROCEDURE — 36415 COLL VENOUS BLD VENIPUNCTURE: CPT

## 2019-01-26 PROCEDURE — 63600175 PHARM REV CODE 636 W HCPCS: Performed by: PHYSICIAN ASSISTANT

## 2019-01-26 PROCEDURE — 63600175 PHARM REV CODE 636 W HCPCS: Performed by: HOSPITALIST

## 2019-01-26 PROCEDURE — 84100 ASSAY OF PHOSPHORUS: CPT

## 2019-01-26 PROCEDURE — 83735 ASSAY OF MAGNESIUM: CPT

## 2019-01-26 PROCEDURE — 11000001 HC ACUTE MED/SURG PRIVATE ROOM

## 2019-01-26 PROCEDURE — 25000003 PHARM REV CODE 250: Performed by: PHYSICIAN ASSISTANT

## 2019-01-26 RX ORDER — HYDROXYZINE HYDROCHLORIDE 25 MG/1
25 TABLET, FILM COATED ORAL 4 TIMES DAILY PRN
Status: DISCONTINUED | OUTPATIENT
Start: 2019-01-26 | End: 2019-01-27 | Stop reason: HOSPADM

## 2019-01-26 RX ORDER — ENOXAPARIN SODIUM 100 MG/ML
40 INJECTION SUBCUTANEOUS EVERY 24 HOURS
Status: DISCONTINUED | OUTPATIENT
Start: 2019-01-26 | End: 2019-01-27 | Stop reason: HOSPADM

## 2019-01-26 RX ADMIN — ACYCLOVIR 400 MG: 200 CAPSULE ORAL at 09:01

## 2019-01-26 RX ADMIN — NICOTINE 1 PATCH: 21 PATCH, EXTENDED RELEASE TRANSDERMAL at 09:01

## 2019-01-26 RX ADMIN — CITALOPRAM HYDROBROMIDE 20 MG: 20 TABLET ORAL at 09:01

## 2019-01-26 RX ADMIN — BUSPIRONE HYDROCHLORIDE 10 MG: 10 TABLET ORAL at 03:01

## 2019-01-26 RX ADMIN — ACETAMINOPHEN 650 MG: 325 TABLET ORAL at 10:01

## 2019-01-26 RX ADMIN — ACYCLOVIR 400 MG: 200 CAPSULE ORAL at 03:01

## 2019-01-26 RX ADMIN — HYDROXYCHLOROQUINE SULFATE 200 MG: 200 TABLET, FILM COATED ORAL at 09:01

## 2019-01-26 RX ADMIN — HYDROXYZINE HYDROCHLORIDE 25 MG: 25 TABLET, FILM COATED ORAL at 09:01

## 2019-01-26 RX ADMIN — SENNOSIDES AND DOCUSATE SODIUM 1 TABLET: 8.6; 5 TABLET ORAL at 09:01

## 2019-01-26 RX ADMIN — QUETIAPINE FUMARATE 25 MG: 25 TABLET ORAL at 11:01

## 2019-01-26 RX ADMIN — HYDROXYZINE HYDROCHLORIDE 25 MG: 25 TABLET, FILM COATED ORAL at 03:01

## 2019-01-26 RX ADMIN — SULFAMETHOXAZOLE AND TRIMETHOPRIM 1 TABLET: 800; 160 TABLET ORAL at 09:01

## 2019-01-26 RX ADMIN — FLUCONAZOLE, SODIUM CHLORIDE 400 MG: 2 INJECTION INTRAVENOUS at 05:01

## 2019-01-26 RX ADMIN — PANCRELIPASE LIPASE, PANCRELIPASE PROTEASE, PANCRELIPASE AMYLASE 1 CAPSULE: 5000; 17000; 24000 CAPSULE, DELAYED RELEASE ORAL at 07:01

## 2019-01-26 RX ADMIN — PANCRELIPASE LIPASE, PANCRELIPASE PROTEASE, PANCRELIPASE AMYLASE 1 CAPSULE: 5000; 17000; 24000 CAPSULE, DELAYED RELEASE ORAL at 12:01

## 2019-01-26 RX ADMIN — PANTOPRAZOLE SODIUM 40 MG: 40 TABLET, DELAYED RELEASE ORAL at 09:01

## 2019-01-26 RX ADMIN — MEGESTROL ACETATE 40 MG: 20 TABLET ORAL at 09:01

## 2019-01-26 RX ADMIN — PANCRELIPASE LIPASE, PANCRELIPASE PROTEASE, PANCRELIPASE AMYLASE 1 CAPSULE: 5000; 17000; 24000 CAPSULE, DELAYED RELEASE ORAL at 05:01

## 2019-01-26 RX ADMIN — MONTELUKAST SODIUM 10 MG: 10 TABLET, FILM COATED ORAL at 09:01

## 2019-01-26 RX ADMIN — KETOROLAC TROMETHAMINE 10 MG: 10 TABLET, FILM COATED ORAL at 01:01

## 2019-01-26 RX ADMIN — KETOROLAC TROMETHAMINE 10 MG: 10 TABLET, FILM COATED ORAL at 04:01

## 2019-01-26 RX ADMIN — ENOXAPARIN SODIUM 40 MG: 100 INJECTION SUBCUTANEOUS at 05:01

## 2019-01-26 RX ADMIN — BUSPIRONE HYDROCHLORIDE 10 MG: 10 TABLET ORAL at 09:01

## 2019-01-26 NOTE — PLAN OF CARE
Problem: Adult Inpatient Plan of Care  Goal: Plan of Care Review  Outcome: Ongoing (interventions implemented as appropriate)   01/26/19 1508   Plan of Care Review   Plan of Care Reviewed With patient   Progress no change     Patient oriented x3 and cooperative.  Anxiety managed prn medication. Poor appetite; encouraged patient to eat small meals. Pain managed w/ prn medication.  Will continue to monitor.

## 2019-01-26 NOTE — ASSESSMENT & PLAN NOTE
Admittedly non-compliant with medications at home  Truvada stopped and unknown ID physician in the outpatient setting  CD4 count 22  ID consult appreciated  Continued rocephin and stopped azithromycin  On IV bactrim and high dose fluconazole  Possible cryptococcal meningitis as cause for encephalopathy (acute mental status change, skin lesions).    Cryptococcal serum antigen negative  LP still needed given still with fever and AMS (although improving)  ID recommending the need for LP still at this time and antibiotics and antifungal treatment adjusted as per ID  IR consulted for LP with opening pressure delayed due to scheduling of room and again due to agitation of the patient  Will premedicate patient with zyprexa 10 mg IM now in prep for procedure, although she is very calm and cooperative today and have PRN medication if she become acutely agitated in the procedure suite  LP performed on 1/24 with normal opening pressure.  Work up mostly unremarkable.  Appreciate ID input.  Changes in mental status possibly secondary to HIV encephalopathy.  Mentation improving.  Stop Rocephin.  Ambulate.  Encourage PO intake.  Probably home tomorrow.

## 2019-01-26 NOTE — SUBJECTIVE & OBJECTIVE
Interval History: Feeling better.  Poor oral intake.    Review of Systems   HENT: Negative for ear discharge and ear pain.    Eyes: Negative for pain and itching.   Endocrine: Negative for polyphagia and polyuria.   Neurological: Negative for seizures and syncope.     Objective:     Vital Signs (Most Recent):  Temp: 98.6 °F (37 °C) (01/26/19 1134)  Pulse: 87 (01/26/19 1134)  Resp: 18 (01/26/19 1134)  BP: 108/74 (01/26/19 1134)  SpO2: 96 % (01/26/19 1134) Vital Signs (24h Range):  Temp:  [97.8 °F (36.6 °C)-99 °F (37.2 °C)] 98.6 °F (37 °C)  Pulse:  [86-93] 87  Resp:  [17-18] 18  SpO2:  [90 %-97 %] 96 %  BP: (108-119)/(71-77) 108/74     Weight: 48.5 kg (106 lb 14.8 oz)  Body mass index is 20.2 kg/m².    Intake/Output Summary (Last 24 hours) at 1/26/2019 1513  Last data filed at 1/26/2019 0800  Gross per 24 hour   Intake 50 ml   Output --   Net 50 ml      Physical Exam   Constitutional: Vital signs are normal. She appears well-developed. No distress.   Chronically ill appearing, but appears much improved   HENT:   Head: Normocephalic.   Mouth/Throat: Mucous membranes are not dry.   Mucosal ulcerations improving   Cardiovascular: Normal rate and regular rhythm.   No murmur heard.  Pulmonary/Chest: Effort normal and breath sounds normal. No respiratory distress. She has no wheezes. She has no rales.   Abdominal: Soft. Normal appearance and bowel sounds are normal. She exhibits no distension and no mass. There is no tenderness. There is no guarding.   Musculoskeletal: She exhibits no edema.   Neurological: She is alert.   Talking more, calm and cooperative, oriented x 3   Skin: Skin is warm. Capillary refill takes less than 2 seconds. She is not diaphoretic. No erythema.   Multiple macular dark skin lesions noted on back   Psychiatric: Her mood appears not anxious. Her speech is delayed. She is slowed. Cognition and memory are impaired. She is attentive.       Significant Labs:   BMP:   Recent Labs   Lab 01/26/19  0611    GLU 73      K 3.8      CO2 22*   BUN 17   CREATININE 1.1   CALCIUM 9.5   MG 1.8     CBC:   Recent Labs   Lab 01/25/19  0442 01/26/19  0611   WBC 4.83 4.37   HGB 10.8* 10.4*   HCT 32.8* 31.1*    249

## 2019-01-26 NOTE — PROGRESS NOTES
"[facility] Medicine  Progress Note    Patient Name: Jayden Marcial  MRN: [id number]  Patient Class: IP- Inpatient   Admission Date: 1/19/2019  Length of Stay: 7 days  Attending Physician: Mohsen Davila MD  Primary Care Provider: [facility]        Subjective:     Principal Problem:AIDS    HPI:  Patient is a 43 year old F with a past medical history of HIV/AIDS, medication non-compliance, crack-cocaine use, anxiety, history of KANDY that presents with feeling "sick" for several days.  Patient not able to elaborate more at time of examination.  She has had symptoms of nausea, vomiting, abdominal pain, cough with mild sputum production.  She denies any fevers, shortness of breath, diarrhea, constipation, dizziness, lightheadedness, vision changes.  Patient states that she ran out of all her home medications about a week ago and that she has not had a chance to refill them as of yet.  Patient also endorses using crack-cocaine 2 days ago and began feeling worse after this.    Hospital Course:  Ms. Marcial was admitted with feeling "sick" in addition to underlying AIDS, non-compliance with meds and crack-cocaine use. She was placed in ICU due to uncontrollable behavior that required restraints and haldol injections. Her BP dropped briefly but did not require pressor support. Patient was calm, cooperative, out of restraints and stable for transfer to floor on 1/20. Of note, father at bedside said that patient was previously on hospice at NH. She was taken off hospice because they felt she was improving and patient decided to go back home to live with her father. He reported that she leaves the home and uses crack-cocaine. She missed last appt with PCP and did not get meds refilled. Pt was placed on empiric rocephin and azithromycin for pneumonia. Father feels dementia and psychosis worsening but also confounded by drug use. Pt with need of sitter for safety given constant " need for redirection while on the floor. Psych and ID consulted. ID stopped azithromycin and added bactrim given her CD4 count. There was also concern for cryptococcal meningitis (acute mental status change, skin lesions). Cryptococcal serum antigen has been ordered and high dose fluconazole for Cryptococcal coverage in the interim on 1/21. ID recommended LP with opening pressure to get crypto antigen csf, cell count, glucose, protein. Also screen for histoplasma, syphilis, and toxoplasma. IR consulted for LP.  LP performed on 1/24 with normal opening pressure.  Completed course of Rocephin.    Interval History: Feeling better.  Poor oral intake.    Review of Systems   HENT: Negative for ear discharge and ear pain.    Eyes: Negative for pain and itching.   Endocrine: Negative for polyphagia and polyuria.   Neurological: Negative for seizures and syncope.     Objective:     Vital Signs (Most Recent):  Temp: 98.6 °F (37 °C) (01/26/19 1134)  Pulse: 87 (01/26/19 1134)  Resp: 18 (01/26/19 1134)  BP: 108/74 (01/26/19 1134)  SpO2: 96 % (01/26/19 1134) Vital Signs (24h Range):  Temp:  [97.8 °F (36.6 °C)-99 °F (37.2 °C)] 98.6 °F (37 °C)  Pulse:  [86-93] 87  Resp:  [17-18] 18  SpO2:  [90 %-97 %] 96 %  BP: (108-119)/(71-77) 108/74     Weight: 48.5 kg (106 lb 14.8 oz)  Body mass index is 20.2 kg/m².    Intake/Output Summary (Last 24 hours) at 1/26/2019 1513  Last data filed at 1/26/2019 0800  Gross per 24 hour   Intake 50 ml   Output --   Net 50 ml      Physical Exam   Constitutional: Vital signs are normal. She appears well-developed. No distress.   Chronically ill appearing, but appears much improved   HENT:   Head: Normocephalic.   Mouth/Throat: Mucous membranes are not dry.   Mucosal ulcerations improving   Cardiovascular: Normal rate and regular rhythm.   No murmur heard.  Pulmonary/Chest: Effort normal and breath sounds normal. No respiratory distress. She has no wheezes. She has no rales.   Abdominal: Soft. Normal  appearance and bowel sounds are normal. She exhibits no distension and no mass. There is no tenderness. There is no guarding.   Musculoskeletal: She exhibits no edema.   Neurological: She is alert.   Talking more, calm and cooperative, oriented x 3   Skin: Skin is warm. Capillary refill takes less than 2 seconds. She is not diaphoretic. No erythema.   Multiple macular dark skin lesions noted on back   Psychiatric: Her mood appears not anxious. Her speech is delayed. She is slowed. Cognition and memory are impaired. She is attentive.       Significant Labs:   BMP:   Recent Labs   Lab 01/26/19  0611   GLU 73      K 3.8      CO2 22*   BUN 17   CREATININE 1.1   CALCIUM 9.5   MG 1.8     CBC:   Recent Labs   Lab 01/25/19  0442 01/26/19  0611   WBC 4.83 4.37   HGB 10.8* 10.4*   HCT 32.8* 31.1*    249         Assessment/Plan:      * AIDS    Admittedly non-compliant with medications at home  Truvada stopped and unknown ID physician in the outpatient setting  CD4 count 22  ID consult appreciated  Continued rocephin and stopped azithromycin  On IV bactrim and high dose fluconazole  Possible cryptococcal meningitis as cause for encephalopathy (acute mental status change, skin lesions).    Cryptococcal serum antigen negative  LP still needed given still with fever and AMS (although improving)  ID recommending the need for LP still at this time and antibiotics and antifungal treatment adjusted as per ID  IR consulted for LP with opening pressure delayed due to scheduling of room and again due to agitation of the patient  Will premedicate patient with zyprexa 10 mg IM now in prep for procedure, although she is very calm and cooperative today and have PRN medication if she become acutely agitated in the procedure suite  LP performed on 1/24 with normal opening pressure.  Work up mostly unremarkable.  Appreciate ID input.  Changes in mental status possibly secondary to HIV encephalopathy.  Mentation improving.   Stop Rocephin.  Ambulate.  Encourage PO intake.  Probably home tomorrow.     Encephalopathy acute    As discussed above  Unclear baseline but with reported change  Slowly improving and becoming more appropriate       Polysubstance abuse    Noted and reported by family       Thrombocytopenia    No signs of bleeding at this time  Normalized platelets  Resolved     Pneumonia due to infectious organism    Given her history of HIV and medication noncompliance  Pt was on treatment for possible PCP pneumonia, now changed for prophylaxis only  Continue Rocephin, Bactrim as per ID  DuoNeb breathing treatments q.6 hours  Sputum culture pending  Supplemental O2 as needed  AFB smears in progress  Appreciate ID input  Stop Rocephin after today.     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       Cocaine abuse    Patient was continued crack cocaine use  Counseled extensively on crack cocaine cessation given her continued chronic medical issues done by Dr. Corey  Avoiding all opiates and educated patient on multiple occasions that pain medications will not be given.     Tobacco abuse    Nicotine patch as needed.       Acute renal failure    Patient with baseline creatinine around 0.8 normally  On admit it was 3.3  Dehydration likely component to acute renal failure  IV fluids with normal saline at 125 cc an hour continuous  Resolved       VTE Risk Mitigation (From admission, onward)        Ordered     IP VTE LOW RISK PATIENT  Once      01/19/19 1118     Place JOHANN hose  Until discontinued      01/19/19 1118              Mohsen Davila MD  Department of Hospital Medicine   [facility]

## 2019-01-27 VITALS
BODY MASS INDEX: 20.19 KG/M2 | RESPIRATION RATE: 17 BRPM | OXYGEN SATURATION: 98 % | TEMPERATURE: 99 F | DIASTOLIC BLOOD PRESSURE: 64 MMHG | WEIGHT: 106.94 LBS | HEIGHT: 61 IN | HEART RATE: 93 BPM | SYSTOLIC BLOOD PRESSURE: 97 MMHG

## 2019-01-27 PROBLEM — J18.9 PNEUMONIA DUE TO INFECTIOUS ORGANISM: Status: RESOLVED | Noted: 2019-01-19 | Resolved: 2019-01-27

## 2019-01-27 PROBLEM — D69.6 THROMBOCYTOPENIA: Chronic | Status: RESOLVED | Noted: 2019-01-19 | Resolved: 2019-01-27

## 2019-01-27 LAB
ASPERGILLUS AB SER QL ID: ABNORMAL
B DERMAT AB SER QL ID: ABNORMAL
BACTERIA CSF CULT: NO GROWTH
C IMMITIS AB SER QL ID: DETECTED
GRAM STN SPEC: NORMAL
H CAPSUL AB SER QL ID: ABNORMAL

## 2019-01-27 PROCEDURE — 25000003 PHARM REV CODE 250: Performed by: HOSPITALIST

## 2019-01-27 PROCEDURE — 25000003 PHARM REV CODE 250: Performed by: PHYSICIAN ASSISTANT

## 2019-01-27 PROCEDURE — S4991 NICOTINE PATCH NONLEGEND: HCPCS | Performed by: EMERGENCY MEDICINE

## 2019-01-27 PROCEDURE — 25000003 PHARM REV CODE 250: Performed by: EMERGENCY MEDICINE

## 2019-01-27 RX ORDER — ACETAMINOPHEN 325 MG/1
650 TABLET ORAL EVERY 4 HOURS PRN
Refills: 0 | COMMUNITY
Start: 2019-01-27

## 2019-01-27 RX ORDER — SULFAMETHOXAZOLE AND TRIMETHOPRIM 800; 160 MG/1; MG/1
1 TABLET ORAL
Qty: 30 TABLET | Refills: 3 | Status: SHIPPED | OUTPATIENT
Start: 2019-01-28

## 2019-01-27 RX ORDER — FLUCONAZOLE 200 MG/1
400 TABLET ORAL DAILY
Qty: 20 TABLET | Refills: 0 | Status: ON HOLD | OUTPATIENT
Start: 2019-01-27 | End: 2019-02-07 | Stop reason: HOSPADM

## 2019-01-27 RX ADMIN — MEGESTROL ACETATE 40 MG: 20 TABLET ORAL at 09:01

## 2019-01-27 RX ADMIN — HYDROXYCHLOROQUINE SULFATE 200 MG: 200 TABLET, FILM COATED ORAL at 09:01

## 2019-01-27 RX ADMIN — PANCRELIPASE LIPASE, PANCRELIPASE PROTEASE, PANCRELIPASE AMYLASE 1 CAPSULE: 5000; 17000; 24000 CAPSULE, DELAYED RELEASE ORAL at 09:01

## 2019-01-27 RX ADMIN — SENNOSIDES AND DOCUSATE SODIUM 1 TABLET: 8.6; 5 TABLET ORAL at 09:01

## 2019-01-27 RX ADMIN — CITALOPRAM HYDROBROMIDE 20 MG: 20 TABLET ORAL at 09:01

## 2019-01-27 RX ADMIN — BUSPIRONE HYDROCHLORIDE 10 MG: 10 TABLET ORAL at 09:01

## 2019-01-27 RX ADMIN — SULFAMETHOXAZOLE AND TRIMETHOPRIM 1 TABLET: 800; 160 TABLET ORAL at 09:01

## 2019-01-27 RX ADMIN — ACYCLOVIR 400 MG: 200 CAPSULE ORAL at 09:01

## 2019-01-27 RX ADMIN — KETOROLAC TROMETHAMINE 10 MG: 10 TABLET, FILM COATED ORAL at 09:01

## 2019-01-27 RX ADMIN — HYDROXYZINE HYDROCHLORIDE 25 MG: 25 TABLET, FILM COATED ORAL at 09:01

## 2019-01-27 RX ADMIN — PANTOPRAZOLE SODIUM 40 MG: 40 TABLET, DELAYED RELEASE ORAL at 09:01

## 2019-01-27 RX ADMIN — NICOTINE 1 PATCH: 21 PATCH, EXTENDED RELEASE TRANSDERMAL at 09:01

## 2019-01-27 NOTE — DISCHARGE SUMMARY
"[facility] Medicine  Discharge Summary      Patient Name: Jayden Marcial  MRN: [id number]  Admission Date: 1/19/2019  Hospital Length of Stay: 8 days  Discharge Date and Time:  01/27/2019 10:13 AM  Attending Physician: Mohsen Davila MD   Discharging Provider: Mohsen Davila MD  Primary Care Provider: [facility]      HPI:   Patient is a 43 year old F with a past medical history of HIV/AIDS, medication non-compliance, crack-cocaine use, anxiety, history of KANDY that presents with feeling "sick" for several days.  Patient not able to elaborate more at time of examination.  She has had symptoms of nausea, vomiting, abdominal pain, cough with mild sputum production.  She denies any fevers, shortness of breath, diarrhea, constipation, dizziness, lightheadedness, vision changes.  Patient states that she ran out of all her home medications about a week ago and that she has not had a chance to refill them as of yet.  Patient also endorses using crack-cocaine 2 days ago and began feeling worse after this.    * No surgery found *      Hospital Course:   Ms. Marcial was admitted with feeling "sick" in addition to underlying AIDS, non-compliance with meds and crack-cocaine use. She was placed in ICU due to uncontrollable behavior that required restraints and haldol injections. Her BP dropped briefly but did not require pressor support. Patient was calm, cooperative, out of restraints and stable for transfer to floor on 1/20. Of note, father at bedside said that patient was previously on hospice at NH. She was taken off hospice because they felt she was improving and patient decided to go back home to live with her father. He reported that she leaves the home and uses crack-cocaine. She missed last appt with PCP and did not get meds refilled. Pt was placed on empiric rocephin and azithromycin for pneumonia. Father feels dementia and psychosis worsening but also confounded by " drug use. Pt with need of sitter for safety given constant need for redirection while on the floor. Psych and ID consulted. ID stopped azithromycin and added bactrim given her CD4 count of 22. There was also concern for cryptococcal meningitis (acute mental status change, skin lesions). Cryptococcal serum antigen has been ordered and high dose fluconazole for Cryptococcal coverage in the interim on 1/21. ID recommended LP with opening pressure to get crypto antigen csf, cell count, glucose, protein. Also screen for histoplasma, syphilis, and toxoplasma. IR consulted for LP.  LP performed on 1/24 with normal opening pressure.  All CSF labs either negative or pending.  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  Completed course of Rocephin for pneumonia during hospital stay.  Patient with advanced AIDS and poor overall prognosis.  She will be discharged with Diflucan for probable candida esophagitis.  Will also order Bactrim 3 times a week for PCP prophylaxis. Follow up with [facility] has been arranged.  Holding HAART until appointment.  Patient will be discharged home.     Consults:   Consults (From admission, onward)        Status Ordering Provider     Inpatient consult to Infectious Diseases  Once     Provider:  Nanette Reagan MD    Completed RANDY WHITE.     Inpatient consult to Interventional Radiology  Once     Provider:  Atilio Mckeon MD    Completed RANDY WHITE.     Inpatient consult to Psychiatry  Once     Provider:  En Thompson MD    Completed CHANDU STEELE     Inpatient consult to Registered Dietitian/Nutritionist  Once     Provider:  (Not yet assigned)    Completed RANDY WHITE.          No new Assessment & Plan notes have been filed under this hospital service since the last note was generated.  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 [B20, F02.81] 06/03/2018 Yes    Cocaine abuse [F14.10] 05/31/2018 Yes    Tobacco abuse [Z72.0] 10/22/2013 Yes     Chronic      Problems Resolved During this Admission:    Diagnosis Date Noted Date Resolved POA    Altered mental status [R41.82]  01/27/2019 Yes    Pneumonia due to infectious organism [J18.9] 01/19/2019 01/27/2019 Yes    Thrombocytopenia [D69.6] 01/19/2019 01/27/2019 Yes     Chronic    Acute renal failure [N17.9] 04/24/2013 01/27/2019 Yes       Discharged Condition: fair    Disposition: Home or Self Care    Follow Up:  Follow-up Information     [facility]. Go on 1/31/2019.    Specialties:  Internal Medicine, Family Medicine  Why:  Outpatient Services, ID Follow-up Appointment, Please arrive to clinic for 3:30PM, You will see Dr. Malgorzata Bui, Report to [facility] [address] [place] [id number] [phone]  Contact information:  [address]  [facility] [id number] [phone]                 Patient Instructions:      Diet Adult Regular     Notify your health care provider if you experience any of the following:  temperature >100.4     Notify your health care provider if you experience any of the following:  persistent nausea and vomiting or diarrhea     Notify your health care provider if you experience any of the following:  difficulty breathing or increased cough     Notify your health care provider if you experience any of the following:  severe persistent headache     Notify your health care provider if you experience any of the following:  persistent dizziness, light-headedness, or visual disturbances     Notify your health care provider if you experience any of the following:  increased confusion or weakness     Activity as tolerated         Pending Diagnostic Studies:     Procedure Component Value Units Date/Time     Flow Cytometry Analysis (CSF) [[id number]] Collected:  01/24/19 1205    Order Status:  Sent Lab Status:  In process Updated:  01/25/19 0736    Specimen:  Cerebrospinal Fluid     HIV-1 GenotypR PLUS [[id number]] Collected:  01/22/19 0725    Order Status:  Sent Lab Status:  In process Updated:  01/22/19 0803    Specimen:  Blood     Mycoplasma Pneumoniae Ab, CF, CSF [[id number]] Collected:  01/24/19 1205    Order Status:  Sent Lab Status:  In process Updated:  01/24/19 1231    Specimen:  CSF (Spinal Fluid) from Cerebrospinal Fluid     Quantiferon Gold TB [[id number]] Collected:  01/25/19 1003    Order Status:  Sent Lab Status:  In process Updated:  01/25/19 1842    Specimen:  Blood     VDRL, CSF [[id number]] Collected:  01/24/19 1205    Order Status:  Sent Lab Status:  In process Updated:  01/24/19 1641    Specimen:  CSF (Spinal Fluid) from Cerebrospinal Fluid          Medications:  Reconciled Home Medications:      Medication List      START taking these medications    acetaminophen 325 MG tablet  Commonly known as:  TYLENOL  Take 2 tablets (650 mg total) by mouth every 4 (four) hours as needed for Pain or Temperature greater than (100).     fluconazole 200 MG Tab  Commonly known as:  DIFLUCAN  Take 2 tablets (400 mg total) by mouth once daily. for 10 days        CHANGE how you take these medications    lipase-protease-amylase 3,000-9,500- 15,000 unit Cpdr  Commonly known as:  CREON  Take 1 capsule by mouth 2 (two) times daily.  What changed:    · medication strength  · how much to take     sulfamethoxazole-trimethoprim 800-160mg 800-160 mg Tab  Commonly known as:  BACTRIM DS  Take 1 tablet by mouth every Mon, Wed, Fri.  Start taking on:  1/28/2019  What changed:  when to take this        CONTINUE taking these medications    busPIRone 10 MG tablet  Commonly known as:  BUSPAR  Take 10 mg by mouth 3 (three) times daily.     citalopram 20 MG tablet  Commonly known as:  CELEXA  Take 1 tablet (20 mg total) by mouth once  daily.     cyproheptadine 2 mg/5 mL syrup  Commonly known as:  (PERIACTIN)  Take by mouth every 8 (eight) hours.     ferrous sulfate 325 mg (65 mg iron) Tab tablet  Commonly known as:  FEOSOL  Take 325 mg by mouth daily with breakfast.     hydrocortisone 1 % cream  Apply topically 2 (two) times daily.     hydroxychloroquine 200 mg tablet  Commonly known as:  PLAQUENIL  Take 200 mg by mouth 2 (two) times daily.     hydrOXYzine HCl 25 MG tablet  Commonly known as:  ATARAX  Take 25 mg by mouth 3 (three) times daily.     ibuprofen 600 MG tablet  Commonly known as:  ADVIL,MOTRIN  Take 1 tablet (600 mg total) by mouth every 6 (six) hours as needed for Pain.     lactulose 10 gram/15 mL solution  Commonly known as:  CHRONULAC  Take by mouth 3 (three) times daily.     megestrol 40 MG Tab  Commonly known as:  MEGACE  Take 1 tablet (40 mg total) by mouth once daily.     miconazole 2 % vaginal cream  Commonly known as:  MICOTIN  Place 1 applicator vaginally every evening.     montelukast 10 mg tablet  Commonly known as:  SINGULAIR  Take 10 mg by mouth every evening.     nicotine 21 mg/24 hr  Commonly known as:  NICODERM CQ  Place 1 patch onto the skin once daily.     omeprazole 20 MG capsule  Commonly known as:  PRILOSEC  Take 20 mg by mouth once daily.     ondansetron 8 MG tablet  Commonly known as:  ZOFRAN  Take 8 mg by mouth every 8 (eight) hours.     VITAMIN D2 50,000 unit Cap  Generic drug:  ergocalciferol  Take 50,000 Units by mouth every 7 days.        STOP taking these medications    acyclovir 200 MG capsule  Commonly known as:  ZOVIRAX     azithromycin 500 MG tablet  Commonly known as:  ZITHROMAX     emtricitabine-tenofovir 200-300 mg 200-300 mg Tab  Commonly known as:  TRUVADA     methadone 10 mg/mL solution  Commonly known as:  DOLOPHINE     oxyCODONE-acetaminophen 2.5-325 mg per tablet  Commonly known as:  PERCOCET            Indwelling Lines/Drains at time of discharge:   Lines/Drains/Airways          None           Time spent on the discharge of patient: >30 minutes  Patient was seen and examined on the date of discharge and determined to be suitable for discharge.         Mohsen Davila MD  Department of Hospital Medicine  [facility]

## 2019-01-27 NOTE — PROGRESS NOTES
WRITTEN HEALTHCARE DISCHARGE INFORMATION     Things that YOU are RESPONSIBLE for to Manage Your Care At Home:    1. Getting your prescriptions filled.  2. Taking you medications as directed. DO NOT MISS ANY DOSES!  3. Going to your follow-up doctor appointments. This is important because it allows the doctor to monitor your progress and to determine if any changes need to be made to your treatment plan.    If you are unable to make your follow up appointments, please call the number listed and reschedule this appointment.     ____________HELP AT HOME____________________    Experiencing any SIGNS or SYMPTOMS: YOU CAN    Schedule a same day appopintment with your Primary Care Doctor or  you can call [facility] On Call Nurse Care Line for 24/7 assistance at [phone]    If you are experience any signs or symptoms that have become severe, Call 911 and come to your nearest Emergency Room.    Thank you for choosing [facility] and allowing us to care for you.   From your care management team: JORGE Li, [facility] [phone]     You should receive a call from [facility] within 48-72 hours to help manage your care after discharge. Please try to make sure that you answer your phone for this important phone call.     Follow-up Information      & [facility]. Go on 1/31/2019.    Specialties:  Internal Medicine, Family Medicine  Why:  Outpatient Services, ID Follow-up Appointment, Please arrive to clinic for 3:30PM, You will see Dr. Malgorzata Bui, Report to [address] [place] [id number] [phone]  Contact information:  [address]  [facility] [id number] [phone]

## 2019-01-27 NOTE — PLAN OF CARE
Problem: Adult Inpatient Plan of Care  Goal: Plan of Care Review  Outcome: Ongoing (interventions implemented as appropriate)  Plan of care discussed w/pt.Pt AAOX3 and able to make needs known. VSSAF. Pt repositions in bed independently. Incontinence care provided. No s/s of distress noted. Safety measures continued. Call bell placed within reach. Will continue to monitor.

## 2019-01-27 NOTE — PLAN OF CARE
"AMARJIT informed Ajay Nurse that pt is cleared from CM standpoint.        01/27/19 1050   Final Note   Assessment Type Final Discharge Note   Anticipated Discharge Disposition Home   What phone number can be called within the next 1-3 days to see how you are doing after discharge? (listed in Chart)   Hospital Follow Up  Appt(s) scheduled? Yes   Discharge plans and expectations educations in teach back method with documentation complete? Yes   Right Care Referral Info   Post Acute Recommendation No Care     EDUCATION:  Pt provided with educational information on " Sepsis/HIV ".  Information reviewed and placed in :My Healthcare Packet" to be brought home for pt to use as resource after discharge.  Information included:  signs and symptoms to look for and call the doctor if experiencing, and symptoms that may indicate a medical emergency: CALL 911.      All questions answered.  Teach back method used.  Pt  verbalized understanding of all information by stating, "  That he is aware of the signs and symptoms to watch for and when to contact MD and when to report to the ED immediately. Also SW took this time to ask pt to provide at least 3 symptoms. Pt stated Fevers, Chest Pain or Difficult breathing.     "

## 2019-01-27 NOTE — NURSING
Removed IV access.  Reviewed discharge instructions w/ patient.  Called patient's parents to  for discharge.  Provided prescriptions. VSS. Patient calm and alert.  Will continue to monitor.

## 2019-01-28 LAB
M TB IFN-G CD4+ BCKGRND COR BLD-ACNC: 0.01 IU/ML
MITOGEN IGNF BCKGRD COR BLD-ACNC: 0.14 IU/ML
MITOGEN IGNF BCKGRD COR BLD-ACNC: ABNORMAL [IU]/ML
NIL: 0.06 IU/ML
TB2 - NIL: 0.01 IU/ML
VDRL CSF QL: NORMAL

## 2019-01-30 LAB
HIV GENTYP ISLT: DETECTED
Lab: NORMAL

## 2019-02-01 ENCOUNTER — TELEPHONE (OUTPATIENT)
Dept: INFECTIOUS DISEASES | Facility: HOSPITAL | Age: 44
End: 2019-02-01

## 2019-02-01 NOTE — TELEPHONE ENCOUNTER
Reviewed labs that returned after discharge-- quantiferon gold indeterminant and cocci positive. Spoke with lab and yvrose doesn't differentiate igm from igg. Spoke with her [facility] physician, Dr Bui to update with results.    Per dr Bui..  Patient showed up for her appt with her yesterday and says she spent >90 min with her and her step father. Step father is caretaker and is overwelmed and cannot care for her at home; at home she is using cocaine, etoh, and prescription narcotics (she supplements her supply with street drugs as well). Step father reports lise's dementia is worsening. Goals of care is to lessen pain. Hospice being considered.

## 2019-02-05 ENCOUNTER — HOSPITAL ENCOUNTER (INPATIENT)
Facility: HOSPITAL | Age: 44
LOS: 2 days | Discharge: HOME | DRG: 315 | End: 2019-02-07
Attending: EMERGENCY MEDICINE | Admitting: EMERGENCY MEDICINE
Payer: MEDICAID

## 2019-02-05 DIAGNOSIS — R45.851 SUICIDAL IDEATION: ICD-10-CM

## 2019-02-05 DIAGNOSIS — Z00.8 MEDICAL CLEARANCE FOR PSYCHIATRIC ADMISSION: ICD-10-CM

## 2019-02-05 DIAGNOSIS — I31.9 PERICARDITIS: ICD-10-CM

## 2019-02-05 DIAGNOSIS — F14.20 COCAINE USE DISORDER, SEVERE, DEPENDENCE: ICD-10-CM

## 2019-02-05 DIAGNOSIS — F19.94 SUBSTANCE INDUCED MOOD DISORDER: ICD-10-CM

## 2019-02-05 DIAGNOSIS — F11.20 OPIOID USE DISORDER, SEVERE, DEPENDENCE: ICD-10-CM

## 2019-02-05 DIAGNOSIS — F02.818 DEMENTIA DUE TO HIV INFECTION WITH BEHAVIORAL DISTURBANCE: ICD-10-CM

## 2019-02-05 DIAGNOSIS — R07.9 CHEST PAIN: ICD-10-CM

## 2019-02-05 DIAGNOSIS — I31.9 PERICARDITIS, UNSPECIFIED CHRONICITY, UNSPECIFIED TYPE: Primary | ICD-10-CM

## 2019-02-05 DIAGNOSIS — B20 HIV DEMENTIA: ICD-10-CM

## 2019-02-05 DIAGNOSIS — B20 DEMENTIA DUE TO HIV INFECTION WITH BEHAVIORAL DISTURBANCE: ICD-10-CM

## 2019-02-05 DIAGNOSIS — F02.80 HIV DEMENTIA: ICD-10-CM

## 2019-02-05 PROBLEM — N28.9 ACUTE RENAL INSUFFICIENCY: Status: ACTIVE | Noted: 2019-02-05

## 2019-02-05 LAB
ALBUMIN SERPL BCP-MCNC: 3 G/DL
ALP SERPL-CCNC: 82 U/L
ALT SERPL W/O P-5'-P-CCNC: 44 U/L
AMPHET+METHAMPHET UR QL: NEGATIVE
ANION GAP SERPL CALC-SCNC: 9 MMOL/L
APAP SERPL-MCNC: <3 UG/ML
AST SERPL-CCNC: 41 U/L
BACTERIA #/AREA URNS HPF: NORMAL /HPF
BARBITURATES UR QL SCN>200 NG/ML: NEGATIVE
BASOPHILS # BLD AUTO: 0.02 K/UL
BASOPHILS NFR BLD: 0.4 %
BENZODIAZ UR QL SCN>200 NG/ML: NEGATIVE
BILIRUB SERPL-MCNC: 0.2 MG/DL
BILIRUB UR QL STRIP: NEGATIVE
BNP SERPL-MCNC: 56 PG/ML
BUN SERPL-MCNC: 45 MG/DL
BZE UR QL SCN: NORMAL
CALCIUM SERPL-MCNC: 9.8 MG/DL
CANNABINOIDS UR QL SCN: NEGATIVE
CHLORIDE SERPL-SCNC: 109 MMOL/L
CK SERPL-CCNC: 197 U/L
CLARITY UR: CLEAR
CO2 SERPL-SCNC: 18 MMOL/L
COLOR UR: YELLOW
CREAT SERPL-MCNC: 1.9 MG/DL
CREAT UR-MCNC: 110.2 MG/DL
CRP SERPL-MCNC: 153.3 MG/L
CTP QC/QA: YES
DIFFERENTIAL METHOD: ABNORMAL
EOSINOPHIL # BLD AUTO: 0.1 K/UL
EOSINOPHIL NFR BLD: 2.8 %
ERYTHROCYTE [DISTWIDTH] IN BLOOD BY AUTOMATED COUNT: 16.5 %
ERYTHROCYTE [SEDIMENTATION RATE] IN BLOOD BY WESTERGREN METHOD: >140 MM/HR
EST. GFR  (AFRICAN AMERICAN): 36 ML/MIN/1.73 M^2
EST. GFR  (NON AFRICAN AMERICAN): 31 ML/MIN/1.73 M^2
ETHANOL SERPL-MCNC: <10 MG/DL
FLUAV AG NPH QL: NEGATIVE
FLUBV AG NPH QL: NEGATIVE
GLUCOSE SERPL-MCNC: 76 MG/DL
GLUCOSE UR QL STRIP: NEGATIVE
HCT VFR BLD AUTO: 28.6 %
HGB BLD-MCNC: 9.7 G/DL
HGB UR QL STRIP: NEGATIVE
HYALINE CASTS #/AREA URNS LPF: 0 /LPF
KETONES UR QL STRIP: NEGATIVE
LDH SERPL L TO P-CCNC: 337 U/L
LEUKOCYTE ESTERASE UR QL STRIP: ABNORMAL
LYMPHOCYTES # BLD AUTO: 0.9 K/UL
LYMPHOCYTES NFR BLD: 17.9 %
MAGNESIUM SERPL-MCNC: 2.4 MG/DL
MCH RBC QN AUTO: 29.3 PG
MCHC RBC AUTO-ENTMCNC: 33.9 G/DL
MCV RBC AUTO: 86 FL
METHADONE UR QL SCN>300 NG/ML: NEGATIVE
MICROSCOPIC COMMENT: NORMAL
MONOCYTES # BLD AUTO: 0.7 K/UL
MONOCYTES NFR BLD: 13.7 %
NEUTROPHILS # BLD AUTO: 3.2 K/UL
NEUTROPHILS NFR BLD: 65.6 %
NITRITE UR QL STRIP: NEGATIVE
OPIATES UR QL SCN: NEGATIVE
PCP UR QL SCN>25 NG/ML: NEGATIVE
PH UR STRIP: 6 [PH] (ref 5–8)
PLATELET # BLD AUTO: 330 K/UL
PLATELET BLD QL SMEAR: ABNORMAL
PMV BLD AUTO: 10.5 FL
POTASSIUM SERPL-SCNC: 4.1 MMOL/L
PROT SERPL-MCNC: 9.7 G/DL
PROT UR QL STRIP: ABNORMAL
RBC # BLD AUTO: 3.31 M/UL
RBC #/AREA URNS HPF: 1 /HPF (ref 0–4)
SODIUM SERPL-SCNC: 136 MMOL/L
SP GR UR STRIP: 1.02 (ref 1–1.03)
SQUAMOUS #/AREA URNS HPF: 3 /HPF
TOXICOLOGY INFORMATION: NORMAL
TROPONIN I SERPL DL<=0.01 NG/ML-MCNC: 0.02 NG/ML
TROPONIN I SERPL DL<=0.01 NG/ML-MCNC: <0.006 NG/ML
TSH SERPL DL<=0.005 MIU/L-ACNC: 0.9 UIU/ML
URN SPEC COLLECT METH UR: ABNORMAL
UROBILINOGEN UR STRIP-ACNC: NEGATIVE EU/DL
WBC # BLD AUTO: 4.96 K/UL
WBC #/AREA URNS HPF: 1 /HPF (ref 0–5)

## 2019-02-05 PROCEDURE — 25000003 PHARM REV CODE 250: Performed by: EMERGENCY MEDICINE

## 2019-02-05 PROCEDURE — 84443 ASSAY THYROID STIM HORMONE: CPT

## 2019-02-05 PROCEDURE — 81000 URINALYSIS NONAUTO W/SCOPE: CPT

## 2019-02-05 PROCEDURE — 80329 ANALGESICS NON-OPIOID 1 OR 2: CPT

## 2019-02-05 PROCEDURE — 85025 COMPLETE CBC W/AUTO DIFF WBC: CPT

## 2019-02-05 PROCEDURE — 80307 DRUG TEST PRSMV CHEM ANLYZR: CPT

## 2019-02-05 PROCEDURE — 80053 COMPREHEN METABOLIC PANEL: CPT

## 2019-02-05 PROCEDURE — 90792 PSYCH DIAG EVAL W/MED SRVCS: CPT | Mod: ,,, | Performed by: PSYCHIATRY & NEUROLOGY

## 2019-02-05 PROCEDURE — 83735 ASSAY OF MAGNESIUM: CPT

## 2019-02-05 PROCEDURE — 84484 ASSAY OF TROPONIN QUANT: CPT | Mod: 91

## 2019-02-05 PROCEDURE — 93010 ELECTROCARDIOGRAM REPORT: CPT | Mod: ,,, | Performed by: INTERNAL MEDICINE

## 2019-02-05 PROCEDURE — 93010 EKG 12-LEAD: ICD-10-PCS | Mod: ,,, | Performed by: INTERNAL MEDICINE

## 2019-02-05 PROCEDURE — 87040 BLOOD CULTURE FOR BACTERIA: CPT | Mod: 59

## 2019-02-05 PROCEDURE — 21400001 HC TELEMETRY ROOM

## 2019-02-05 PROCEDURE — 99285 EMERGENCY DEPT VISIT HI MDM: CPT | Mod: 25

## 2019-02-05 PROCEDURE — 86140 C-REACTIVE PROTEIN: CPT

## 2019-02-05 PROCEDURE — 85652 RBC SED RATE AUTOMATED: CPT

## 2019-02-05 PROCEDURE — 96361 HYDRATE IV INFUSION ADD-ON: CPT

## 2019-02-05 PROCEDURE — 25000003 PHARM REV CODE 250: Performed by: HOSPITALIST

## 2019-02-05 PROCEDURE — 36415 COLL VENOUS BLD VENIPUNCTURE: CPT

## 2019-02-05 PROCEDURE — 96374 THER/PROPH/DIAG INJ IV PUSH: CPT

## 2019-02-05 PROCEDURE — 83615 LACTATE (LD) (LDH) ENZYME: CPT

## 2019-02-05 PROCEDURE — 90792 PR PSYCHIATRIC DIAGNOSTIC EVALUATION W/MEDICAL SERVICES: ICD-10-PCS | Mod: ,,, | Performed by: PSYCHIATRY & NEUROLOGY

## 2019-02-05 PROCEDURE — 82550 ASSAY OF CK (CPK): CPT

## 2019-02-05 PROCEDURE — 63600175 PHARM REV CODE 636 W HCPCS: Performed by: EMERGENCY MEDICINE

## 2019-02-05 PROCEDURE — 83880 ASSAY OF NATRIURETIC PEPTIDE: CPT

## 2019-02-05 PROCEDURE — 80320 DRUG SCREEN QUANTALCOHOLS: CPT

## 2019-02-05 PROCEDURE — 93005 ELECTROCARDIOGRAM TRACING: CPT

## 2019-02-05 RX ORDER — ACETAMINOPHEN 325 MG/1
650 TABLET ORAL EVERY 8 HOURS PRN
Status: DISCONTINUED | OUTPATIENT
Start: 2019-02-05 | End: 2019-02-07 | Stop reason: HOSPADM

## 2019-02-05 RX ORDER — COLCHICINE 0.6 MG/1
0.6 TABLET, FILM COATED ORAL DAILY
Status: DISCONTINUED | OUTPATIENT
Start: 2019-02-06 | End: 2019-02-07 | Stop reason: HOSPADM

## 2019-02-05 RX ORDER — ONDANSETRON 2 MG/ML
4 INJECTION INTRAMUSCULAR; INTRAVENOUS EVERY 8 HOURS PRN
Status: DISCONTINUED | OUTPATIENT
Start: 2019-02-05 | End: 2019-02-07 | Stop reason: HOSPADM

## 2019-02-05 RX ORDER — SODIUM CHLORIDE 0.9 % (FLUSH) 0.9 %
5 SYRINGE (ML) INJECTION
Status: DISCONTINUED | OUTPATIENT
Start: 2019-02-05 | End: 2019-02-07 | Stop reason: HOSPADM

## 2019-02-05 RX ORDER — DICYCLOMINE HYDROCHLORIDE 10 MG/1
10 CAPSULE ORAL EVERY 6 HOURS PRN
Status: DISCONTINUED | OUTPATIENT
Start: 2019-02-05 | End: 2019-02-07 | Stop reason: HOSPADM

## 2019-02-05 RX ORDER — SERTRALINE HYDROCHLORIDE 25 MG/1
25 TABLET, FILM COATED ORAL DAILY
Status: DISCONTINUED | OUTPATIENT
Start: 2019-02-06 | End: 2019-02-07 | Stop reason: HOSPADM

## 2019-02-05 RX ORDER — RAMELTEON 8 MG/1
8 TABLET ORAL NIGHTLY PRN
Status: DISCONTINUED | OUTPATIENT
Start: 2019-02-05 | End: 2019-02-07 | Stop reason: HOSPADM

## 2019-02-05 RX ORDER — LORAZEPAM 2 MG/ML
1 INJECTION INTRAMUSCULAR
Status: COMPLETED | OUTPATIENT
Start: 2019-02-05 | End: 2019-02-05

## 2019-02-05 RX ORDER — SODIUM CHLORIDE 9 MG/ML
INJECTION, SOLUTION INTRAVENOUS CONTINUOUS
Status: DISCONTINUED | OUTPATIENT
Start: 2019-02-05 | End: 2019-02-06

## 2019-02-05 RX ORDER — OLANZAPINE 10 MG/2ML
10 INJECTION, POWDER, FOR SOLUTION INTRAMUSCULAR ONCE AS NEEDED
Status: COMPLETED | OUTPATIENT
Start: 2019-02-05 | End: 2019-02-07

## 2019-02-05 RX ORDER — COLCHICINE 0.6 MG/1
0.6 TABLET, FILM COATED ORAL
Status: COMPLETED | OUTPATIENT
Start: 2019-02-05 | End: 2019-02-05

## 2019-02-05 RX ORDER — PANTOPRAZOLE SODIUM 40 MG/10ML
40 INJECTION, POWDER, LYOPHILIZED, FOR SOLUTION INTRAVENOUS DAILY
Status: DISCONTINUED | OUTPATIENT
Start: 2019-02-06 | End: 2019-02-07

## 2019-02-05 RX ORDER — HYDROXYZINE HYDROCHLORIDE 25 MG/1
50 TABLET, FILM COATED ORAL NIGHTLY PRN
Status: DISCONTINUED | OUTPATIENT
Start: 2019-02-05 | End: 2019-02-07 | Stop reason: HOSPADM

## 2019-02-05 RX ORDER — CLONIDINE HYDROCHLORIDE 0.1 MG/1
0.1 TABLET ORAL EVERY 12 HOURS
Status: DISCONTINUED | OUTPATIENT
Start: 2019-02-05 | End: 2019-02-07

## 2019-02-05 RX ORDER — CYCLOBENZAPRINE HCL 5 MG
5 TABLET ORAL EVERY 8 HOURS PRN
Status: DISCONTINUED | OUTPATIENT
Start: 2019-02-05 | End: 2019-02-07 | Stop reason: HOSPADM

## 2019-02-05 RX ORDER — SULFAMETHOXAZOLE AND TRIMETHOPRIM 800; 160 MG/1; MG/1
1 TABLET ORAL DAILY
Status: DISCONTINUED | OUTPATIENT
Start: 2019-02-06 | End: 2019-02-05

## 2019-02-05 RX ORDER — IBUPROFEN 200 MG
1 TABLET ORAL DAILY
Status: DISCONTINUED | OUTPATIENT
Start: 2019-02-06 | End: 2019-02-07 | Stop reason: HOSPADM

## 2019-02-05 RX ORDER — LOPERAMIDE HYDROCHLORIDE 2 MG/1
2 CAPSULE ORAL
Status: DISCONTINUED | OUTPATIENT
Start: 2019-02-05 | End: 2019-02-07 | Stop reason: HOSPADM

## 2019-02-05 RX ADMIN — HYDROXYZINE HYDROCHLORIDE 50 MG: 25 TABLET, FILM COATED ORAL at 08:02

## 2019-02-05 RX ADMIN — PANCRELIPASE LIPASE, PANCRELIPASE PROTEASE, PANCRELIPASE AMYLASE 1 CAPSULE: 5000; 17000; 24000 CAPSULE, DELAYED RELEASE ORAL at 05:02

## 2019-02-05 RX ADMIN — CLONIDINE HYDROCHLORIDE 0.1 MG: 0.1 TABLET ORAL at 09:02

## 2019-02-05 RX ADMIN — SODIUM CHLORIDE 1000 ML: 0.9 INJECTION, SOLUTION INTRAVENOUS at 02:02

## 2019-02-05 RX ADMIN — SODIUM CHLORIDE: 0.9 INJECTION, SOLUTION INTRAVENOUS at 05:02

## 2019-02-05 RX ADMIN — COLCHICINE 0.6 MG: 0.6 TABLET, FILM COATED ORAL at 03:02

## 2019-02-05 RX ADMIN — LORAZEPAM 1 MG: 2 INJECTION, SOLUTION INTRAMUSCULAR; INTRAVENOUS at 02:02

## 2019-02-05 NOTE — H&P
"[facility] Medicine  History & Physical    Patient Name: Jayden Marcial  MRN: [id number]  Admission Date: 2/5/2019  Attending Physician: Irina Lima MD   Primary Care Provider: [facility]         Patient information was obtained from patient, past medical records and ER records.     Subjective:     Principal Problem:Pericarditis    Chief Complaint:   Chief Complaint   Patient presents with    Psychiatric Evaluation     + auditory hallucinations telling "her to harm herself", insomnia, hx of dementia and mental health, pt also becomes combative easily. Father reports ot has not been eating or drinking.         HPI: Ms Jayden Marcial is a 43 y.o. woman with HIV/AIDS, HIV dementia, depression, polysubstance abuse who presents with "pain all over." Per Psych notes, she was actually taken to ED "patient wandering the streets and stating that she has been hearing voices to kill herself." She currently says that she is sometimes depressed and anxious but that she does not have any plans to kill herself or anyone else. She continues to complain of pain all over. She does not specify any chest pain currently, but previously in ED she did describe chest pain. She denies shortness of breath and fevers. She does have a cough productive of white sputum. Her adherence to mediations is questionable. She smokes crack cocaine and uses opioids. She requests IV dilaudid and IV benadryl to "knock [her] out" so that she will sleep tonight.     In ED, EKG showed diffuse ST segment elevation. Troponin not elevated. TTE pending. CRP and ESR elevated. Psych consulted and patient PEC'd.         Past Medical History:   Diagnosis Date    Addiction to drug     AIDS     Blind right eye     Cocaine use     HIV dementia     Malnutrition     Non compliance with medical treatment     Pancreatitis     Pericarditis 02/05/2019    Psychiatric problem     TB (pulmonary tuberculosis)     " Tobacco abuse        Past Surgical History:   Procedure Laterality Date    ABSCESS DRAINAGE N/A 06/02/2015    mid uppeer lip    APPENDECTOMY      ASPIRATION ABSCESS FACE N/A 6/2/2015    Performed by [facility] at [facility] OR    BRONCHOSCOPY N/A 7/26/2016    Performed by Jamil Ryder MD at [facility] ENDO    CORNEAL TRANSPLANT Right        Review of patient's allergies indicates:  No Known Allergies    No current facility-administered medications on file prior to encounter.      Current Outpatient Medications on File Prior to Encounter   Medication Sig    acetaminophen (TYLENOL) 325 MG tablet Take 2 tablets (650 mg total) by mouth every 4 (four) hours as needed for Pain or Temperature greater than (100).    busPIRone (BUSPAR) 10 MG tablet Take 10 mg by mouth 3 (three) times daily.    citalopram (CELEXA) 20 MG tablet Take 1 tablet (20 mg total) by mouth once daily.    cyproheptadine (,PERIACTIN,) 2 mg/5 mL syrup Take by mouth every 8 (eight) hours.    ergocalciferol (VITAMIN D2) 50,000 unit Cap Take 50,000 Units by mouth every 7 days.    ferrous sulfate (FEOSOL) 325 mg (65 mg iron) Tab tablet Take 325 mg by mouth daily with breakfast.    fluconazole (DIFLUCAN) 200 MG Tab Take 2 tablets (400 mg total) by mouth once daily. for 10 days    hydrocortisone 1 % cream Apply topically 2 (two) times daily.    hydroxychloroquine (PLAQUENIL) 200 mg tablet Take 200 mg by mouth 2 (two) times daily.    hydrOXYzine HCl (ATARAX) 25 MG tablet Take 25 mg by mouth 3 (three) times daily.    ibuprofen (ADVIL,MOTRIN) 600 MG tablet Take 1 tablet (600 mg total) by mouth every 6 (six) hours as needed for Pain.    lactulose (CHRONULAC) 10 gram/15 mL solution Take by mouth 3 (three) times daily.    lipase-protease-amylase (CREON) 3,000-9,500- 15,000 unit CpDR Take 1 capsule by mouth 2 (two) times daily.    megestrol (MEGACE) 40 MG Tab Take 1 tablet (40 mg total) by mouth once daily.    miconazole (MICOTIN) 2 % vaginal cream  Place 1 applicator vaginally every evening.    montelukast (SINGULAIR) 10 mg tablet Take 10 mg by mouth every evening.    nicotine (NICODERM CQ) 21 mg/24 hr Place 1 patch onto the skin once daily.    omeprazole (PRILOSEC) 20 MG capsule Take 20 mg by mouth once daily.    ondansetron (ZOFRAN) 8 MG tablet Take 8 mg by mouth every 8 (eight) hours.    sulfamethoxazole-trimethoprim 800-160mg (BACTRIM DS) 800-160 mg Tab Take 1 tablet by mouth every Mon, Wed, Fri.     Family History     Problem Relation (Age of Onset)    COPD Mother    Cancer Maternal Grandmother    Emphysema Mother    Hypertension Mother        Tobacco Use    Smoking status: Current Every Day Smoker     Packs/day: 0.50     Years: 20.00     Pack years: 10.00    Smokeless tobacco: Never Used   Substance and Sexual Activity    Alcohol use: Yes     Alcohol/week: 0.0 oz     Comment: 2/5/19: per step father a 1/2 pint a day, drink around mid January 2019     Drug use: Yes     Frequency: 1.0 times per week     Types: Cocaine, Benzodiazepines, Marijuana, Methamphetamines, Oxycodone, Hydrocodone     Comment: 2/5/19: last used cocaine a few days ago; frequent methadone and pain pill user (purchases from the street)    Sexual activity: Not on file     Review of Systems   Constitutional: Negative for activity change, appetite change, chills, fatigue and fever.   HENT: Negative for congestion, nosebleeds, postnasal drip, rhinorrhea, sinus pressure, sinus pain, sore throat and trouble swallowing.    Eyes: Negative for visual disturbance.   Respiratory: Positive for cough. Negative for chest tightness, shortness of breath and wheezing.    Cardiovascular: Negative for chest pain, palpitations and leg swelling.   Gastrointestinal: Negative for abdominal pain, constipation, diarrhea, nausea and vomiting.   Genitourinary: Negative for decreased urine volume, difficulty urinating, dysuria, frequency and urgency.   Musculoskeletal: Negative for arthralgias and  myalgias.   Skin: Positive for rash. Negative for wound.   Neurological: Negative for dizziness, weakness, light-headedness, numbness and headaches.   Psychiatric/Behavioral: Negative for agitation, confusion, hallucinations and suicidal ideas. The patient is not nervous/anxious.      Objective:     Vital Signs (Most Recent):  Temp: 98 °F (36.7 °C) (02/05/19 1630)  Pulse: 98 (02/05/19 1630)  Resp: 19 (02/05/19 1630)  BP: 112/82 (02/05/19 1630)  SpO2: 100 % (02/05/19 1630) Vital Signs (24h Range):  Temp:  [97.9 °F (36.6 °C)-98.9 °F (37.2 °C)] 98 °F (36.7 °C)  Pulse:  [] 98  Resp:  [19-22] 19  SpO2:  [99 %-100 %] 100 %  BP: (101-112)/(65-82) 112/82     Weight: 49.9 kg (110 lb)  Body mass index is 21.48 kg/m².    Physical Exam   Constitutional: She is oriented to person, place, and time. No distress.   Thin woman   HENT:   Head: Normocephalic and atraumatic.   Nose: Nose normal.   Mouth/Throat: Oropharynx is clear and moist. No oropharyngeal exudate.   Eyes: Conjunctivae and EOM are normal. No scleral icterus.   Neck: Neck supple. No JVD present. No thyromegaly present.   Cardiovascular: Normal rate, regular rhythm, normal heart sounds and intact distal pulses. Exam reveals no gallop and no friction rub.   No murmur heard.  Pulmonary/Chest: Effort normal and breath sounds normal. No stridor. No respiratory distress. She has no wheezes. She has no rales.   Abdominal: Soft. Bowel sounds are normal. She exhibits no distension and no mass. There is no tenderness. There is no guarding.   Musculoskeletal: She exhibits no edema.   Lymphadenopathy:     She has no cervical adenopathy.   Neurological: She is alert and oriented to person, place, and time. No cranial nerve deficit or sensory deficit.   Skin: Skin is warm and dry. Rash (hyperpigmented macular rash) noted. She is not diaphoretic.   Nursing note and vitals reviewed.        CRANIAL NERVES     CN III, IV, VI   Extraocular motions are normal.        Significant  Labs: All pertinent labs within the past 24 hours have been reviewed.    Significant Imaging: I have reviewed and interpreted all pertinent imaging results/findings within the past 24 hours.    Assessment/Plan:     * Pericarditis    At one point complained of chest pain  EKG with diffuse ST elevations  Troponin not elevated  ESR and CRP elevated  TTE pending  May be viral in etiology- patient does have URI symptoms and cough  Given colchicine 1.2mg in ED. Continue colchicine 0.6mg daily  Cardiololgy consulted and ID consulted for pericarditis in setting of HIV/AIDS       Acute renal insufficiency    Cr 1.9 on admit from baseline 1.1  UA 1+ protein, trace leukocytes  May be prerenal vs due to bactrim. Hold bactrim for now and start IVF.        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 She is oriented x3 and mostly appropriate, though malingering for IV dilaudid, in her communication. Monitor.        Cocaine use disorder, severe, dependence    Positive on admit  Admits to crack cocaine use. Denies injecting and snorting  Counseled on cessation  Avoid scheduled medications        AIDS    CD4= 22/2.9% on 1/2019. Viral load positive  Not on ART at home  On last admit 1/19-1/27, she had thorough workup for AMS that was all negative. Infectious workup reviewed. Cause of AMS attributed to HIV dementia. She is not altered on admit.   Was discharged on bactrim for ppx- unclear if she has been taking it, but if she has, that may explain elevated Cr on presentation. Hold bactrim for now while waiting for repeat Cr in AM. May be able to resume bactrim 3x/week if renal function improved         Moderate protein-calorie malnutrition    Body mass index is 21.48  kg/m².  Encourage diet       Tobacco abuse    Patient was counseled on smoking cessation for 4 minutes. We discussed how smoking is detrimental to the patient's health. Prescription for nicotine patch was offered.         Anemia of chronic disorder    Hgb 9.7 on admit, slightly below prior  Likely due to HIV/AIDS         VTE Risk Mitigation (From admission, onward)        Ordered     IP VTE LOW RISK PATIENT  Once      02/05/19 1705     Place JOHANN hose  Until discontinued      02/05/19 1705             Irina Lima MD  Department of Hospital Medicine   [facility]

## 2019-02-05 NOTE — ASSESSMENT & PLAN NOTE
Patient with substance induced depression and suicidal ideations.  Will need to be treated.  Start sertraline 25 mg daily targeting depression.  Agree with PEC and one-to-one sitter because of suicidal ideation.  Notify corner of commitment process.  Seek acute inpatient psychiatric facility when medically cleared.  Hopefully as she clears up from her medical problems and drug use, her suicidal intent will also lessen.  Should she have any non redirectable or psychotic agitation, utilize olanzapine 10 mg p.o./IM every 8 hr p.r.n..  She does not have enough evidence for schizophrenia diagnosis.

## 2019-02-05 NOTE — ED PROVIDER NOTES
"Encounter Date: 2/5/2019    SCRIBE #1 NOTE: I, Adi Luna, am scribing for, and in the presence of,  Lev Oliva MD . I have scribed the following portions of the note - Other sections scribed: HPI, ROS .       History     Chief Complaint   Patient presents with    Psychiatric Evaluation     + auditory hallucinations telling "her to harm herself", insomnia, hx of dementia and mental health, pt also becomes combative easily. Father reports ot has not been eating or drinking.      CC: Psychiatric Eval    HPI: 42 y/o F who has a past medical history of AIDS, Blind right eye, Cocaine use, HIV dementia, Malnutrition, Non compliance with medical treatment, Pancreatitis, Sleep difficulties, TB (pulmonary tuberculosis), and Tobacco abuse presents to the ED via EMS for emergent evaluation of a psychiatric evaluation. Pt has a hx of dementia and symptoms similar to schizophrenia. She was found by her neighbors wondering the neighborhoods. As a result, her family call EMS for a psychiatric evaluation. Family reports the pt is compliant with her medications. The pt herself states that she is in "pain all over and that her heart hearts". While being evaluated the pt tried to leave the room by walking out stating repeatedly "I am really thirsty."        The history is provided by the patient. No  was used.     Review of patient's allergies indicates:  No Known Allergies  Past Medical History:   Diagnosis Date    Addiction to drug     AIDS     Blind right eye     Cocaine use     HIV dementia     Malnutrition     Non compliance with medical treatment     Pancreatitis     Pericarditis 02/05/2019    Psychiatric problem     TB (pulmonary tuberculosis)     Tobacco abuse      Past Surgical History:   Procedure Laterality Date    ABSCESS DRAINAGE N/A 06/02/2015    mid uppeer lip    APPENDECTOMY      ASPIRATION ABSCESS FACE N/A 6/2/2015    [facility]    " BRONCHOSCOPY N/A 7/26/2016    [facility] ENDO    CORNEAL TRANSPLANT Right      Family History   Problem Relation Age of Onset    Hypertension Mother     COPD Mother     Emphysema Mother     Cancer Maternal Grandmother      Social History     Tobacco Use    Smoking status: Current Every Day Smoker     Packs/day: 0.50     Years: 20.00     Pack years: 10.00    Smokeless tobacco: Never Used   Substance Use Topics    Alcohol use: Yes     Alcohol/week: 0.0 oz     Comment: 2/5/19: per step father a 1/2 pint a day, drink around mid January 2019     Drug use: Yes     Frequency: 1.0 times per week     Types: Cocaine, Benzodiazepines, Marijuana, Methamphetamines, Oxycodone, Hydrocodone     Comment: 2/5/19: last used cocaine a few days ago; frequent methadone and pain pill user (purchases from the street)     Review of Systems   Constitutional: Negative for appetite change and fever.   HENT: Negative for rhinorrhea and sore throat.    Eyes: Negative for visual disturbance.   Respiratory: Negative for cough and shortness of breath.    Cardiovascular: Negative for chest pain.   Gastrointestinal: Negative for abdominal pain.   Genitourinary: Negative for dysuria.   Musculoskeletal: Negative for gait problem.   Skin: Negative for rash.   Neurological: Negative for syncope.   Psychiatric/Behavioral: Positive for hallucinations.       Physical Exam     Initial Vitals [02/05/19 1035]   BP Pulse Resp Temp SpO2   103/71 102 (!) 22 98.9 °F (37.2 °C) 99 %      MAP       --         Physical Exam    Nursing note and vitals reviewed.  Constitutional: She appears well-developed and well-nourished.   Disheveled appearance   HENT:   Dry oropharynx and dry cracked lips that have dried blood on them   Eyes: EOM are normal. Pupils are equal, round, and reactive to light.   Neck: Normal range of motion. Neck supple. No thyromegaly present. No JVD present.   Cardiovascular: Normal rate, regular rhythm, normal  heart sounds and intact distal pulses. Exam reveals no gallop and no friction rub.    No murmur heard.  Pulmonary/Chest: Breath sounds normal. No respiratory distress. She exhibits no tenderness.   Abdominal: Soft. Bowel sounds are normal. There is no tenderness.   Musculoskeletal: Normal range of motion. She exhibits no edema or tenderness.   Neurological: She is alert. She has normal strength.   Oriented to person and type of place (hospital) but not specific place or current time.   Skin: Skin is warm and dry.         ED Course   Critical Care  Date/Time: 2/5/2019 4:35 PM  Performed by: Lev Oliva MD  Authorized by: Lev Oliva MD   Direct patient critical care time: 20 minutes  Additional history critical care time: 5 minutes  Ordering / reviewing critical care time: 10 minutes  Documentation critical care time: 10 minutes  Consulting other physicians critical care time: 15 minutes  Other critical care time: 10 (admission) minutes  Total critical care time (exclusive of procedural time) : 70 minutes  Critical care time was exclusive of separately billable procedures and treating other patients and teaching time.  Critical care was necessary to treat or prevent imminent or life-threatening deterioration of the following conditions: cardiac failure and toxidrome (psychiatric).  Critical care was time spent personally by me on the following activities: development of treatment plan with patient or surrogate, discussions with consultants, interpretation of cardiac output measurements, evaluation of patient's response to treatment, examination of patient, obtaining history from patient or surrogate, ordering and performing treatments and interventions, ordering and review of laboratory studies, ordering and review of radiographic studies, pulse oximetry, re-evaluation of patient's condition and review of old charts.        Labs Reviewed   CBC W/ AUTO DIFFERENTIAL - Abnormal; Notable for the  following components:       Result Value    RBC 3.31 (*)     Hemoglobin 9.7 (*)     Hematocrit 28.6 (*)     RDW 16.5 (*)     Lymph # 0.9 (*)     Lymph% 17.9 (*)     All other components within normal limits   COMPREHENSIVE METABOLIC PANEL - Abnormal; Notable for the following components:    CO2 18 (*)     BUN, Bld 45 (*)     Creatinine 1.9 (*)     Total Protein 9.7 (*)     Albumin 3.0 (*)     AST 41 (*)     eGFR if  36 (*)     eGFR if non  31 (*)     All other components within normal limits   URINALYSIS, REFLEX TO URINE CULTURE - Abnormal; Notable for the following components:    Protein, UA 1+ (*)     Leukocytes, UA Trace (*)     All other components within normal limits    Narrative:     Preferred Collection Type->Urine, Clean Catch   ACETAMINOPHEN LEVEL - Abnormal; Notable for the following components:    Acetaminophen (Tylenol), Serum <3.0 (*)     All other components within normal limits   SEDIMENTATION RATE - Abnormal; Notable for the following components:    Sed Rate >140 (*)     All other components within normal limits   C-REACTIVE PROTEIN - Abnormal; Notable for the following components:    .3 (*)     All other components within normal limits   CK - Abnormal; Notable for the following components:     (*)     All other components within normal limits   LACTATE DEHYDROGENASE - Abnormal; Notable for the following components:     (*)     All other components within normal limits   TSH   DRUG SCREEN PANEL, URINE EMERGENCY    Narrative:     Preferred Collection Type->Urine, Clean Catch   ALCOHOL,MEDICAL (ETHANOL)   B-TYPE NATRIURETIC PEPTIDE   TROPONIN I   MAGNESIUM   URINALYSIS MICROSCOPIC    Narrative:     Preferred Collection Type->Urine, Clean Catch   POCT INFLUENZA A/B     EKG Readings: (Independently Interpreted)   Initial Reading: No STEMI. Rhythm: Sinus Tachycardia. Heart Rate: 108.   Diffuse inferior and lateral ST elevations and p.r. depressions  consistent with pericarditis     ECG Results          EKG 12-lead (Final result)  Result time 02/06/19 19:21:28    Final result by Interface, Lab In [facility] (02/06/19 19:21:28)                 Narrative:    Test Reason : R07.9,    Vent. Rate : 111 BPM     Atrial Rate : 111 BPM     P-R Int : 128 ms          QRS Dur : 080 ms      QT Int : 402 ms       P-R-T Axes : 081 078 070 degrees     QTc Int : 546 ms    Sinus tachycardia  Possible Left atrial enlargement  ST elevation, consider early repolarization, pericarditis, or injury  Prolonged QT  Abnormal ECG  When compared with ECG of 05-FEB-2019 12:35,  No significant change was found  Confirmed by Ronni Olson MD ([id number]) on 2/6/2019 7:21:14 PM    Referred By: [email or web address]   SELF           Confirmed By:Ronni Olson MD                             EKG 12-lead (Final result)  Result time 02/06/19 19:20:55    Final result by Interface, Lab In [facility] (02/06/19 19:20:55)                 Narrative:    Test Reason : Z00.8,    Vent. Rate : 108 BPM     Atrial Rate : 108 BPM     P-R Int : 130 ms          QRS Dur : 080 ms      QT Int : 302 ms       P-R-T Axes : 085 078 069 degrees     QTc Int : 404 ms    Sinus tachycardia  Possible Left atrial enlargement  LVH  ST elevation, consider early repolarization, pericarditis, or injury  Abnormal ECG  When compared with ECG of 19-JAN-2019 07:26,  Significant changes have occurred  Confirmed by Ronni Olson MD ([id number]) on 2/6/2019 7:20:41 PM    Referred By: [email or web address]   SELF           Confirmed By:Ronni Olson MD                            Imaging Results          X-Ray Chest 1 View (Final result)  Result time 02/05/19 12:53:37    Final result by Isidro Burk MD (02/05/19 12:53:37)                 Impression:      Mild bibasilar linear opacity likely representing scarring or atelectasis      Electronically signed by: Isidro Burk MD  Date:    02/05/2019  Time:    12:53             Narrative:    EXAMINATION:  XR CHEST 1 VIEW    CLINICAL  HISTORY:  Encounter for other general examination    TECHNIQUE:  Single frontal view of the chest was performed.    COMPARISON:  Chest radiograph 01/19/2019    FINDINGS:  There is mild bibasilar linear opacity.  Otherwise, the lungs are clear.  The cardiac silhouette is unremarkable.  Osseous and soft tissue structures demonstrate no acute abnormality.                              X-Rays:   Independently Interpreted Readings:   Chest X-Ray: Normal heart size.  No infiltrates.  No acute abnormalities.       patient reportedly sent to ER by family for auditory hallucinations telling her to harm herself and suicidal ideation.  Apparently had been wandering around the neighborhood and family feels she is gravely disabled as well as potentially suicidal.  Patient however states she wanted to come the hospital for chest pain.  EKG concerning for pericarditis.  Patient is cocaine positive but without immediate toxidrome.  Patient did have some tachycardia but no elevated blood pressure or fever or dilated pupils.  Patient's ESR and CRP are significantly elevated.  Patient is anemic.  Patient does not have a white cell count however has a history of HIV/aids.  Per EMS the family states she is compliant with her medications.  Patient states she is compliant with the medications.  Chart suggests history of noncompliance.  Patient's BUN and creatinine are also elevated.  Clinically the patient appears dehydrated.  Unable to medically clear the patient for psychiatric placement.  Will admit to the hospital under physician emergent certificate for suicidal ideation and grave disability and evaluate the patient further for pericarditis.  Will consult cardiology infectious disease.  Psychiatry saw the patient in the emergency room and will continue to follow.          Scribe Attestation:   Scribe #1: I performed the above scribed service and the documentation accurately describes the services I performed. I attest to the accuracy  of the note.    Attending Attestation:           Physician Attestation for Scribe:  Physician Attestation Statement for Scribe #1: I, Lev Oliva MD , reviewed documentation, as scribed by Adi Luna  in my presence, and it is both accurate and complete.                    Clinical Impression:   The primary encounter diagnosis was Pericarditis, unspecified chronicity, unspecified type. 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                             Lev Oliva MD  02/06/19 1938

## 2019-02-05 NOTE — ED NOTES
"Pt was place in room 15 by EMS. Pt walked out of the room and stated, It's ok. I want to leave." Security was called and pt was back into the room without in harm done to pt or staff.  "

## 2019-02-05 NOTE — ASSESSMENT & PLAN NOTE
Patient with prominent changes in cognitive functioning due to dementia with HIV.  Not sure of how good her baseline will get because of her comorbid significant drug problem.  Although cared for in the home setting, she apparently is still able to have access to her drugs.  She is not caring for herself, physically or mentally.  Consideration will need to be given in the future as to if her home setting is appropriate to her medical needs.

## 2019-02-05 NOTE — ASSESSMENT & PLAN NOTE
Cr 1.9 on admit from baseline 1.1  UA 1+ protein, trace leukocytes  May be prerenal vs due to bactrim. Hold bactrim for now and start IVF.

## 2019-02-05 NOTE — HPI
Patient Jayden Marcial was brought to the emergency room after family called EMS because of patient wandering the streets and stating that she has been hearing voices to kill herself.  Patient has a significant history of AIDS, Blind right eye, Cocaine use, HIV dementia, Malnutrition, Non compliance with medical treatment, Pancreatitis, Sleep difficulties, TB (pulmonary tuberculosis).  She is a difficult and poor historian.  Every other sentence out of her mouth is can I have some pain medicine.  She is not able to answer most questions.  She is only oriented to her name and hospital.  She is not able to provide me with the month or year.  She tells me that she lives with her father.  She states that she has children but is not able to tell me how many or their names.  She is not able to tell me where she grew up or her current address.  She is not able to tell me her doctor or which medications she is prescribed.  She is not able to tell me her detailed medical history only I'm sick.  She shakes her head no when asked if she has ever tried to hurt herself in the past.  When I ask if she has any access to weapons, she stares at me blankly.  She shakes her head yes when asked if she is depressed.  She also shakes her head yes when asked if she is anxious.  She says that she has voices telling her to kill herself.  She also shakes her head yes when asked if she can see things that others do not see or may bother her, but does not elaborate.  She shakes her head yes when asked if she smokes but does not tell me how much.  I asked her if she does any drugs and she says crack.  Again, she asks for pain medicines on multiple instances during our conversation.  I asked her if she takes pain medications and she shakes her head yes again.  I asked her if she has a prescription for them and she shakes her head no and states that she gets them from people.  Of note, she was a little combative and attempted elopement  within the emergency room setting.  She has required redirection and sitters.  PEC was written in the emergency room for suicidal statement.    There is concern for cardiac related issues because of her complaint that her heart was hurting when she came into the emergency room.  She is being admitted to the medical service for monitoring.  A review of the [place] Board [employer] Pharm[employer] website shows multiple prescriptions for oxycodone, methadone, alprazolam with the last ones being filled in late November 2018.  Our records indicate that she at 1 point was prescribed buspirone, citalopram, hydroxyzine as an outpatient and has multiple previous admissions for drug use.  No psychiatric admissions noted in our charting.

## 2019-02-05 NOTE — ED TRIAGE NOTES
"Pt arrived to Ed due to reported  "voices telling me to kill myself" Denies HI. Pt refusing to answer other questions. Security at bedside do to pt being combative, and trying to leave room  "

## 2019-02-05 NOTE — HPI
"Ms Jayden Marcial is a 43 y.o. woman with HIV/AIDS, HIV dementia, depression, polysubstance abuse who presents with "pain all over." Per Psych notes, she was actually taken to ED "patient wandering the streets and stating that she has been hearing voices to kill herself." She currently says that she is sometimes depressed and anxious but that she does not have any plans to kill herself or anyone else. She continues to complain of pain all over. She does not specify any chest pain currently, but previously in ED she did describe chest pain. She denies shortness of breath and fevers. She does have a cough productive of white sputum. Her adherence to mediations is questionable. She smokes crack cocaine and uses opioids. She requests IV dilaudid and IV benadryl to "knock [her] out" so that she will sleep tonight.     In ED, EKG showed diffuse ST segment elevation. Troponin not elevated. TTE pending. CRP and ESR elevated. Psych consulted and patient PEC'd.       "

## 2019-02-05 NOTE — ED NOTES
"When asking pt about cp. Pt states " I have pain all over." rates pain 8/10. MD notified and verbalized understanding  "

## 2019-02-05 NOTE — ASSESSMENT & PLAN NOTE
Ongoing and chronic cocaine use.  Needs rehab and counseling but is not amenable at this time.  Should not be provided controlled substances given her significant drug problem.

## 2019-02-05 NOTE — ASSESSMENT & PLAN NOTE
At one point complained of chest pain  EKG with diffuse ST elevations  Troponin not elevated  ESR and CRP elevated  TTE pending  May be viral in etiology- patient does have URI symptoms and cough  Given colchicine 1.2mg in ED. Continue colchicine 0.6mg daily  Cardiololgy consulted and ID consulted for pericarditis in setting of HIV/AIDS

## 2019-02-05 NOTE — SUBJECTIVE & OBJECTIVE
Patient History           Medical as of 2/5/2019     Past Medical History     Diagnosis Date Comments Source    Addiction to drug -- -- Provider    AIDS -- -- Provider    Blind right eye -- -- Provider    Cocaine use -- -- Provider    HIV dementia -- -- Provider    Malnutrition -- -- Provider    Non compliance with medical treatment -- -- Provider    Pancreatitis -- -- Provider    Psychiatric problem -- -- Provider    TB (pulmonary tuberculosis) -- -- Provider    Tobacco abuse -- -- Provider          Pertinent Negatives     Diagnosis Date Noted Comments Source    Suicide attempt 02/05/2019 -- Provider                  Surgical as of 2/5/2019     Past Surgical History     Procedure Laterality Date Comments Source    APPENDECTOMY -- -- -- Provider    ABSCESS DRAINAGE N/A 06/02/2015 mid [facility] lip Provider    CORNEAL TRANSPLANT Right -- -- Provider                  Family as of 2/5/2019     Problem Relation Name Age of Onset Comments Source    Hypertension Mother -- -- -- Provider    COPD Mother -- -- -- Provider    Emphysema Mother -- -- -- Provider    Cancer Maternal Grandmother -- -- -- Provider            Tobacco Use as of 2/5/2019     Smoking Status Smoking Start Date Smoking Quit Date Packs/Day Years Used    Current Every Day Smoker -- -- 0.50 20.00    Types Comments Smokeless Tobacco Status Smokeless Tobacco Quit Date Source    -- -- Never Used -- Provider            Alcohol Use as of 2/5/2019     Alcohol Use Drinks/Week Alcohol/Week Comments Source    Yes -- 0.0 oz 2/5/19: per step father a 1/2 pint a day, drink around mid January 2019  Provider    Frequency Standard Drinks Binge Drinking Source      -- -- -- Provider             Drug Use as of 2/5/2019     Drug Use Types Frequency Comments Source    Yes  Cocaine, Benzodiazepines, Marijuana, Methamphetamines, Oxycodone, Hydrocodone 1.0 2/5/19: last used cocaine a few days ago; frequent methadone and pain pill user (purchases from the street) Provider             Sexual Activity as of 2/5/2019     Sexually Active Birth Control Partners Comments Source    Not Asked -- -- -- Provider            Activities of Daily Living as of 2/5/2019     Activities of Daily Living Question Response Comments Source    Patient feels they ought to cut down on drinking/drug use Not Asked -- Provider    Patient annoyed by others criticizing their drinking/drug use Not Asked -- Provider    Patient has felt bad or guilty about drinking/drug use Not Asked -- Provider    Patient has had a drink/used drugs as an eye opener in the AM Not Asked -- Provider            Social Documentation as of 2/5/2019    None           Occupational as of 2/5/2019    None           Socioeconomic as of 2/5/2019     Marital Status Spouse Name Number of Children Years Education Education Level Preferred Language Ethnicity Race Source    Single -- -- -- -- English /Black Black or  Provider    Financial Resource Strain Food Insecurity: Worry Food Insecurity: Inability Transportation Needs: Medical Transportation Needs: Non-medical       -- -- -- -- --             Pertinent History     Question Response Comments    Lives with family Lives with father    Place in Birth Order -- --    Lives in -- --    Number of Siblings -- --    Raised by -- --    Legal Involvement -- --    Childhood Trauma -- --    Criminal History of -- --    Financial Status -- --    Highest Level of Education -- --    Does patient have access to a firearm? -- --     Service -- --    Primary Leisure Activity -- --    Spirituality -- --        Past Medical History:   Diagnosis Date    Addiction to drug     AIDS     Blind right eye     Cocaine use     HIV dementia     Malnutrition     Non compliance with medical treatment     Pancreatitis     Psychiatric problem     TB (pulmonary tuberculosis)     Tobacco abuse      Past Surgical History:   Procedure Laterality Date    ABSCESS DRAINAGE N/A 06/02/2015     [facility] lip    APPENDECTOMY      ASPIRATION ABSCESS FACE N/A 6/2/2015    [facility]    BRONCHOSCOPY N/A 7/26/2016    Per[facility] Jamil Ryder [facility] at [facility] ENDO    CORNEAL TRANSPLANT Right      Family History     Problem Relation (Age of Onset)    COPD Mother    Cancer Maternal Grandmother    Emphysema Mother    Hypertension Mother        Tobacco Use    Smoking status: Current Every Day Smoker     Packs/day: 0.50     Years: 20.00     Pack years: 10.00    Smokeless tobacco: Never Used   Substance and Sexual Activity    Alcohol use: Yes     Alcohol/week: 0.0 oz     Comment: 2/5/19: per step father a 1/2 pint a day, drink around mid January 2019     Drug use: Yes     Frequency: 1.0 times per week     Types: Cocaine, Benzodiazepines, Marijuana, Methamphetamines, Oxycodone, Hydrocodone     Comment: 2/5/19: last used cocaine a few days ago; frequent methadone and pain pill user (purchases from the street)    Sexual activity: Not on file     Review of patient's allergies indicates:  No Known Allergies    No current facility-administered medications on file prior to encounter.      Current Outpatient Medications on File Prior to Encounter   Medication Sig    acetaminophen (TYLENOL) 325 MG tablet Take 2 tablets (650 mg total) by mouth every 4 (four) hours as needed for Pain or Temperature greater than (100).    busPIRone (BUSPAR) 10 MG tablet Take 10 mg by mouth 3 (three) times daily.    citalopram (CELEXA) 20 MG tablet Take 1 tablet (20 mg total) by mouth once daily.    cyproheptadine (,PERIACTIN,) 2 mg/5 mL syrup Take by mouth every 8 (eight) hours.    ergocalciferol (VITAMIN D2) 50,000 unit Cap Take 50,000 Units by mouth every 7 days.    ferrous sulfate (FEOSOL) 325 mg (65 mg iron) Tab tablet Take 325 mg by mouth daily with breakfast.    fluconazole (DIFLUCAN) 200 MG Tab Take 2 tablets (400 mg total) by mouth once daily. for 10 days    hydrocortisone 1 % cream Apply  topically 2 (two) times daily.    hydroxychloroquine (PLAQUENIL) 200 mg tablet Take 200 mg by mouth 2 (two) times daily.    hydrOXYzine HCl (ATARAX) 25 MG tablet Take 25 mg by mouth 3 (three) times daily.    ibuprofen (ADVIL,MOTRIN) 600 MG tablet Take 1 tablet (600 mg total) by mouth every 6 (six) hours as needed for Pain.    lactulose (CHRONULAC) 10 gram/15 mL solution Take by mouth 3 (three) times daily.    lipase-protease-amylase (CREON) 3,000-9,500- 15,000 unit CpDR Take 1 capsule by mouth 2 (two) times daily.    megestrol (MEGACE) 40 MG Tab Take 1 tablet (40 mg total) by mouth once daily.    miconazole (MICOTIN) 2 % vaginal cream Place 1 applicator vaginally every evening.    montelukast (SINGULAIR) 10 mg tablet Take 10 mg by mouth every evening.    nicotine (NICODERM CQ) 21 mg/24 hr Place 1 patch onto the skin once daily.    omeprazole (PRILOSEC) 20 MG capsule Take 20 mg by mouth once daily.    ondansetron (ZOFRAN) 8 MG tablet Take 8 mg by mouth every 8 (eight) hours.    sulfamethoxazole-trimethoprim 800-160mg (BACTRIM DS) 800-160 mg Tab Take 1 tablet by mouth every Mon, Wed, Fri.     Psychotherapeutics (From admission, onward)    None        Review of Systems   Unable to perform ROS: Dementia (She is a very poor historian)     Strengths and Liabilities: Liability: Patient is defensive., Liability: Patient is dependent., Liability: Patient lacks social skills., Liability: Patient has poor health., Liability: Patient has poor judgment, Liability: Patient has possible cognitive impairment., Liability: Patient lacks coping skills.    Objective:     Vital Signs (Most Recent):  Temp: 97.9 °F (36.6 °C) (02/05/19 1412)  Pulse: 97 (02/05/19 1412)  Resp: 20 (02/05/19 1412)  BP: 102/73 (02/05/19 1412)  SpO2: 100 % (02/05/19 1412) Vital Signs (24h Range):  Temp:  [97.9 °F (36.6 °C)-98.9 °F (37.2 °C)] 97.9 °F (36.6 °C)  Pulse:  [] 97  Resp:  [20-22] 20  SpO2:  [99 %-100 %] 100 %  BP: (101-103)/(65-73)  102/73     Height: 5' (152.4 cm)  Weight: 49.9 kg (110 lb)  Body mass index is 21.48 kg/m².    No intake or output data in the 24 hours ending 02/05/19 1533    Physical Exam   Psychiatric:   EXAMINATION    CONSTITUTIONAL  General Appearance:  Disheveled, blue scrubs    MUSCULOSKELETAL  Muscle Strength and Tone:  Appears weak  Abnormal Involuntary Movements:  None noted  Gait and Station:  Not observed    PSYCHIATRIC MENTAL STATUS EXAM   Level of Consciousness:  Awake  Orientation:  Name and hospital only  Grooming:  Very poor  Psychomotor Behavior:  Slowed  Speech:  Soft and delayed  Language:  No abnormalities  Mood:  Not able to obtain  Affect:  Odd and severely blunted/flat  Thought Process:  [place] with poverty  Associations:  Not necessarily intact  Thought Content:  Perseverative on pain meds; admits to auditory and visual hallucinations; admits to command hallucinations of suicide with no particular plan  Memory:  Not intact to recent and remote  Attention:  Distracted  Fund of Knowledge:  Simple to conversation, poor  Insight:  Poor into current medical condition and care  Judgment:  Poor into self-care and compliance with treatment          Significant Labs:   Last 24 Hours:   Recent Lab Results       02/05/19  1402   02/05/19  1252   02/05/19  1251   02/05/19  1228   02/05/19  1110        Benzodiazepines         Negative     Methadone metabolites         Negative     Phencyclidine         Negative     Acetaminophen (Tylenol), Serum       <3.0  Comment:  Toxic Levels:  Adults (4 hr post-ingestion).........>150 ug/mL  Adults (12 hr post-ingestion)........>40 ug/mL  Peds (2 hr post-ingestion, liquid)...>225 ug/mL         Albumin       3.0       Alcohol, Medical, Serum       <10       Alkaline Phosphatase       82       ALT       44       Amphetamine Screen, Ur         Negative     Anion Gap       9       Appearance, UA         Clear     AST       41       Bacteria, UA         None     Barbiturate Screen,  Ur         Negative     Baso #       0.02       Basophil%       0.4       Bilirubin (UA)         Negative     Total Bilirubin       0.2  Comment:  For infants and newborns, interpretation of results should be based  on gestational age, weight and in agreement with clinical  observations.  Premature Infant recommended reference ranges:  Up to 24 hours.............<8.0 mg/dL  Up to 48 hours............<12.0 mg/dL  3-5 days..................<15.0 mg/dL  6-29 days.................<15.0 mg/dL         BNP     56  Comment:  Values of less than 100 pg/ml are consistent with non-CHF populations.         BUN, Bld       45       Calcium       9.8       Chloride       109       CO2       18       Cocaine (Metab.)         Presumptive Positive     Color, UA         Yellow     CPK   197           Creatinine       1.9       Creatinine, Random Ur         110.2  Comment:  The random urine reference ranges provided were established   for 24 hour urine collections.  No reference ranges exist for  random urine specimens.  Correlate clinically.       CRP   153.3           Differential Method       Automated       eGFR if        36       eGFR if non        31  Comment:  Calculation used to obtain the estimated glomerular filtration  rate (eGFR) is the CKD-EPI equation.          Eos #       0.1       Eosinophil%       2.8       Glucose       76       Glucose, UA         Negative     Gran # (ANC)       3.2       Gran%       65.6       Hematocrit       28.6       Hemoglobin       9.7       Hyaline Casts, UA         0     Ketones, UA         Negative     LD   337  Comment:  Results are increased in hemolyzed samples.           Leukocytes, UA         Trace     Lymph #       0.9       Lymph%       17.9       Magnesium   2.4           MCH       29.3       MCHC       33.9       MCV       86       Microscopic Comment         SEE COMMENT  Comment:  Other formed elements not mentioned in the report are not   present  in the microscopic examination.        Mono #       0.7       Mono%       13.7       MPV       10.5       Nitrite, UA         Negative     Occult Blood UA         Negative     Opiate Scrn, Ur         Negative     pH, UA         6.0     Platelet Estimate       Appears normal       Platelets       330       Potassium       4.1       Total Protein       9.7       Protein, UA         1+  Comment:  Recommend a 24 hour urine protein or a urine   protein/creatinine ratio if globulin induced proteinuria is  clinically suspected.        Acceptable Yes             Rapid Influenza A Ag Negative             Rapid Influenza B Ag Negative             RBC       3.31       RBC, UA         1     RDW       16.5       Sed Rate   >140           Sodium       136       Specific Gravity, UA         1.020     Specimen UA         Urine, Clean Catch     Squam Epithel, UA         3     Marijuana (THC) Metabolite         Negative     Toxicology Information         SEE COMMENT  Comment:  This screen includes the following classes of drugs at the   listed cut-off:  Benzodiazepines                  200 ng/ml  Methadone                        300 ng/ml  Cocaine metabolite               300 ng/ml  Opiates                          300 ng/ml  Barbiturates                     200 ng/ml  Amphetamines                    1000 ng/ml  Marijuana metabs (THC)            50 ng/ml  Phencyclidine (PCP)               25 ng/ml  High concentrations of Diphenhydramine may cross-react with  Phencyclidine PCP screening immunoassay giving a false   positive result.  High concentrations of Methylenedioxymethamphetamine (MDMA aka  Ectasy) and other structurally similar compounds may cross-   react with the Amphetamine/Methamphetamine screening   immunoassay giving a false positive result.  A metabolite of the anti-HIV drug Sustiva () may cause  false positive results in the Marijuana metabolite (THC)   screening assay.  Note: This exception list  includes only more common   interferants in toxicology screen testing.  Because of many   cross-reactantspositive results on toxicology drug screens   should be confirmed whenever results do not correlate with   clinical presentation.  This report is intended for use in clinical monitoring and  management of patients. It is not intended for use in   employment related drug testing.  Because of any cross-reactants, positive results on toxicology  drug screens should be confirmed whenever results do not  correlate with clinical presentation.  Presumptive positive results are unconfirmed and may be used   only for medical purposes.       Troponin I   0.016  Comment:  The reference interval for Troponin I represents the 99th percentile   cutoff   for our facility and is consistent with 3rd generation assay   performance.             TSH       0.898       Urobilinogen, UA         Negative     WBC, UA         1     WBC       4.96                          All pertinent labs within the past 24 hours have been reviewed.    Significant Imaging: I have reviewed all pertinent imaging results/findings within the past 24 hours.

## 2019-02-05 NOTE — ED NOTES
"Pt attempting to exit facility. Pt yelling "just take me to [facility]". Pt uncooperative with staff. Security notified. MD made aware. Pt escorted back to room.   "

## 2019-02-05 NOTE — NURSING
Pt arrived from ED via bed. Telemetry monitor on. No signs of distress noted. Safety sitter @bedside. Received report from JOSTIN Howard.

## 2019-02-05 NOTE — CONSULTS
[facility]  Psychiatry  Consult Note    Patient Name: Jayden Marcial  MRN: [id number]   Code Status: Prior  Admission Date: 2/5/2019  Hospital Length of Stay: 0 days  Attending Physician: Lev Oliva MD  Primary Care Provider: [facility]    Current Legal Status: [facility]    Patient information was obtained from patient, Chart review, nursing and ER records.   Inpatient consult to Psychiatry  Consult performed by: En Thompson MD  Consult ordered by: Lev Oliva MD        Subjective:     Principal Problem:<principal problem not specified>    Chief Complaint:  Drug use and suicidal ideation     HPI: Patient Jayden Marcial was brought to the emergency room after family called EMS because of patient wandering the streets and stating that she has been hearing voices to kill herself.  Patient has a significant history of AIDS, Blind right eye, Cocaine use, HIV dementia, Malnutrition, Non compliance with medical treatment, Pancreatitis, Sleep difficulties, TB (pulmonary tuberculosis).  She is a difficult and poor historian.  Every other sentence out of her mouth is can I have some pain medicine.  She is not able to answer most questions.  She is only oriented to her name and hospital.  She is not able to provide me with the month or year.  She tells me that she lives with her father.  She states that she has children but is not able to tell me how many or their names.  She is not able to tell me where she grew up or her current address.  She is not able to tell me her doctor or which medications she is prescribed.  She is not able to tell me her detailed medical history only I'm sick.  She shakes her head no when asked if she has ever tried to hurt herself in the past.  When I ask if she has any access to weapons, she stares at me blankly.  She shakes her head yes when asked if she is depressed.  She also shakes her head yes when asked if she is anxious.   She says that she has voices telling her to kill herself.  She also shakes her head yes when asked if she can see things that others do not see or may bother her, but does not elaborate.  She shakes her head yes when asked if she smokes but does not tell me how much.  I asked her if she does any drugs and she says crack.  Again, she asks for pain medicines on multiple instances during our conversation.  I asked her if she takes pain medications and she shakes her head yes again.  I asked her if she has a prescription for them and she shakes her head no and states that she gets them from people.  Of note, she was a little combative and attempted elopement within the emergency room setting.  She has required redirection and sitters.  PEC was written in the emergency room for suicidal statement.    There is concern for cardiac related issues because of her complaint that her heart was hurting when she came into the emergency room.  She is being admitted to the medical service for monitoring.  A review of the [place] Board of Pharmacy website shows multiple prescriptions for oxycodone, methadone, alprazolam with the last ones being filled in late November 2018.  Our records indicate that she at 1 point was prescribed buspirone, citalopram, hydroxyzine as an outpatient and has multiple previous admissions for drug use.  No psychiatric admissions noted in our charting.          Hospital Course: No notes on file         Patient History           Medical as of 2/5/2019     Past Medical History     Diagnosis Date Comments Source    Addiction to drug -- -- Provider    AIDS -- -- Provider    Blind right eye -- -- Provider    Cocaine use -- -- Provider    HIV dementia -- -- Provider    Malnutrition -- -- Provider    Non compliance with medical treatment -- -- Provider    Pancreatitis -- -- Provider    Psychiatric problem -- -- Provider    TB (pulmonary tuberculosis) -- -- Provider    Tobacco abuse -- -- Provider           Pertinent Negatives     Diagnosis Date Noted Comments Source    Suicide attempt 02/05/2019 -- Provider                  Surgical as of 2/5/2019     Past Surgical History     Procedure Laterality Date Comments Source    APPENDECTOMY -- -- -- Provider    ABSCESS DRAINAGE N/A 06/02/2015 mid [facility] lip Provider    CORNEAL TRANSPLANT Right -- -- Provider                  Family as of 2/5/2019     Problem Relation Name Age of Onset Comments Source    Hypertension Mother -- -- -- Provider    COPD Mother -- -- -- Provider    Emphysema Mother -- -- -- Provider    Cancer Maternal Grandmother -- -- -- Provider            Tobacco Use as of 2/5/2019     Smoking Status Smoking Start Date Smoking Quit Date Packs/Day Years Used    Current Every Day Smoker -- -- 0.50 20.00    Types Comments Smokeless Tobacco Status Smokeless Tobacco Quit Date Source    -- -- Never Used -- Provider            Alcohol Use as of 2/5/2019     Alcohol Use Drinks/Week Alcohol/Week Comments Source    Yes -- 0.0 oz 2/5/19: per step father a 1/2 pint a day, drink around mid January 2019  Provider    Frequency Standard Drinks Binge Drinking Source      -- -- -- Provider             Drug Use as of 2/5/2019     Drug Use Types Frequency Comments Source    Yes  Cocaine, Benzodiazepines, Marijuana, Methamphetamines, Oxycodone, Hydrocodone 1.0 2/5/19: last used cocaine a few days ago; frequent methadone and pain pill user (purchases from the street) Provider            Sexual Activity as of 2/5/2019     Sexually Active Birth Control Partners Comments Source    Not Asked -- -- -- Provider            Activities of Daily Living as of 2/5/2019     Activities of Daily Living Question Response Comments Source    Patient feels they ought to cut down on drinking/drug use Not Asked -- Provider    Patient annoyed by others criticizing their drinking/drug use Not Asked -- Provider    Patient has felt bad or guilty about drinking/drug use Not Asked -- Provider    Patient has  had a drink/used drugs as an eye opener in the AM Not Asked -- Provider            Social Documentation as of 2/5/2019    None           Occupational as of 2/5/2019    None           Socioeconomic as of 2/5/2019     Marital Status Spouse Name Number of Children Years Education Education Level Preferred Language Ethnicity Race Source    Single -- -- -- -- English /Black Black or  Provider    Financial Resource Strain Food Insecurity: Worry Food Insecurity: Inability Transportation Needs: Medical Transportation Needs: Non-medical       -- -- -- -- --             Pertinent History     Question Response Comments    Lives with family Lives with father    Place in Birth Order -- --    Lives in -- --    Number of Siblings -- --    Raised by -- --    Legal Involvement -- --    Childhood Trauma -- --    Criminal History of -- --    Financial Status -- --    Highest Level of Education -- --    Does patient have access to a firearm? -- --     Service -- --    Primary Leisure Activity -- --    Spirituality -- --        Past Medical History:   Diagnosis Date    Addiction to drug     AIDS     Blind right eye     Cocaine use     HIV dementia     Malnutrition     Non compliance with medical treatment     Pancreatitis     Psychiatric problem     TB (pulmonary tuberculosis)     Tobacco abuse      Past Surgical History:   Procedure Laterality Date    ABSCESS DRAINAGE N/A 06/02/2015    mid [facility] lip    APPENDECTOMY      ASPIRATION ABSCESS FACE N/A 6/2/2015    [facility]    BRONCHOSCOPY N/A 7/26/2016    [facility]formed [facility]il [facility]der [facility] at [facility] ENDO    CORNEAL TRANSPLANT Right      Family History     Problem Relation (Age of Onset)    COPD Mother    Cancer Maternal Grandmother    Emphysema Mother    Hypertension Mother        Tobacco Use    Smoking status: Current Every Day Smoker     Packs/day: 0.50     Years: 20.00     Pack years: 10.00     Smokeless tobacco: Never Used   Substance and Sexual Activity    Alcohol use: Yes     Alcohol/week: 0.0 oz     Comment: 2/5/19: per step father a 1/2 pint a day, drink around mid January 2019     Drug use: Yes     Frequency: 1.0 times per week     Types: Cocaine, Benzodiazepines, Marijuana, Methamphetamines, Oxycodone, Hydrocodone     Comment: 2/5/19: last used cocaine a few days ago; frequent methadone and pain pill user (purchases from the street)    Sexual activity: Not on file     Review of patient's allergies indicates:  No Known Allergies    No current facility-administered medications on file prior to encounter.      Current Outpatient Medications on File Prior to Encounter   Medication Sig    acetaminophen (TYLENOL) 325 MG tablet Take 2 tablets (650 mg total) by mouth every 4 (four) hours as needed for Pain or Temperature greater than (100).    busPIRone (BUSPAR) 10 MG tablet Take 10 mg by mouth 3 (three) times daily.    citalopram (CELEXA) 20 MG tablet Take 1 tablet (20 mg total) by mouth once daily.    cyproheptadine (,PERIACTIN,) 2 mg/5 mL syrup Take by mouth every 8 (eight) hours.    ergocalciferol (VITAMIN D2) 50,000 unit Cap Take 50,000 Units by mouth every 7 days.    ferrous sulfate (FEOSOL) 325 mg (65 mg iron) Tab tablet Take 325 mg by mouth daily with breakfast.    fluconazole (DIFLUCAN) 200 MG Tab Take 2 tablets (400 mg total) by mouth once daily. for 10 days    hydrocortisone 1 % cream Apply topically 2 (two) times daily.    hydroxychloroquine (PLAQUENIL) 200 mg tablet Take 200 mg by mouth 2 (two) times daily.    hydrOXYzine HCl (ATARAX) 25 MG tablet Take 25 mg by mouth 3 (three) times daily.    ibuprofen (ADVIL,MOTRIN) 600 MG tablet Take 1 tablet (600 mg total) by mouth every 6 (six) hours as needed for Pain.    lactulose (CHRONULAC) 10 gram/15 mL solution Take by mouth 3 (three) times daily.    lipase-protease-amylase (CREON) 3,000-9,500- 15,000 unit CpDR Take 1 capsule by mouth  2 (two) times daily.    megestrol (MEGACE) 40 MG Tab Take 1 tablet (40 mg total) by mouth once daily.    miconazole (MICOTIN) 2 % vaginal cream Place 1 applicator vaginally every evening.    montelukast (SINGULAIR) 10 mg tablet Take 10 mg by mouth every evening.    nicotine (NICODERM CQ) 21 mg/24 hr Place 1 patch onto the skin once daily.    omeprazole (PRILOSEC) 20 MG capsule Take 20 mg by mouth once daily.    ondansetron (ZOFRAN) 8 MG tablet Take 8 mg by mouth every 8 (eight) hours.    sulfamethoxazole-trimethoprim 800-160mg (BACTRIM DS) 800-160 mg Tab Take 1 tablet by mouth every Mon, Wed, Fri.     Psychotherapeutics (From admission, onward)    None        Review of Systems   Unable to perform ROS: Dementia (She is a very poor historian)     Strengths and Liabilities: Liability: Patient is defensive., Liability: Patient is dependent., Liability: Patient lacks social skills., Liability: Patient has poor health., Liability: Patient has poor judgment, Liability: Patient has possible cognitive impairment., Liability: Patient lacks coping skills.    Objective:     Vital Signs (Most Recent):  Temp: 97.9 °F (36.6 °C) (02/05/19 1412)  Pulse: 97 (02/05/19 1412)  Resp: 20 (02/05/19 1412)  BP: 102/73 (02/05/19 1412)  SpO2: 100 % (02/05/19 1412) Vital Signs (24h Range):  Temp:  [97.9 °F (36.6 °C)-98.9 °F (37.2 °C)] 97.9 °F (36.6 °C)  Pulse:  [] 97  Resp:  [20-22] 20  SpO2:  [99 %-100 %] 100 %  BP: (101-103)/(65-73) 102/73     Height: 5' (152.4 cm)  Weight: 49.9 kg (110 lb)  Body mass index is 21.48 kg/m².    No intake or output data in the 24 hours ending 02/05/19 1533    Physical Exam   Psychiatric:   EXAMINATION    CONSTITUTIONAL  General Appearance:  Disheveled, blue scrubs    MUSCULOSKELETAL  Muscle Strength and Tone:  Appears weak  Abnormal Involuntary Movements:  None noted  Gait and Station:  Not observed    PSYCHIATRIC MENTAL STATUS EXAM   Level of Consciousness:  Awake  Orientation:  Name and hospital  only  Grooming:  Very poor  Psychomotor Behavior:  Slowed  Speech:  Soft and delayed  Language:  No abnormalities  Mood:  Not able to obtain  Affect:  Odd and severely blunted/flat  Thought Process:  [place] with poverty  Associations:  Not necessarily intact  Thought Content:  Perseverative on pain meds; admits to auditory and visual hallucinations; admits to command hallucinations of suicide with no particular plan  Memory:  Not intact to recent and remote  Attention:  Distracted  Fund of Knowledge:  Simple to conversation, poor  Insight:  Poor into current medical condition and care  Judgment:  Poor into self-care and compliance with treatment          Significant Labs:   Last 24 Hours:   Recent Lab Results       02/05/19  1402   02/05/19  1252   02/05/19  1251   02/05/19  1228   02/05/19  1110        Benzodiazepines         Negative     Methadone metabolites         Negative     Phencyclidine         Negative     Acetaminophen (Tylenol), Serum       <3.0  Comment:  Toxic Levels:  Adults (4 hr post-ingestion).........>150 ug/mL  Adults (12 hr post-ingestion)........>40 ug/mL  Peds (2 hr post-ingestion, liquid)...>225 ug/mL         Albumin       3.0       Alcohol, Medical, Serum       <10       Alkaline Phosphatase       82       ALT       44       Amphetamine Screen, Ur         Negative     Anion Gap       9       Appearance, UA         Clear     AST       41       Bacteria, UA         None     Barbiturate Screen, Ur         Negative     Baso #       0.02       Basophil%       0.4       Bilirubin (UA)         Negative     Total Bilirubin       0.2  Comment:  For infants and newborns, interpretation of results should be based  on gestational age, weight and in agreement with clinical  observations.  Premature Infant recommended reference ranges:  Up to 24 hours.............<8.0 mg/dL  Up to 48 hours............<12.0 mg/dL  3-5 days..................<15.0 mg/dL  6-29 days.................<15.0 mg/dL         BNP      56  Comment:  Values of less than 100 pg/ml are consistent with non-CHF populations.         BUN, Bld       45       Calcium       9.8       Chloride       109       CO2       18       Cocaine (Metab.)         Presumptive Positive     Color, UA         Yellow     CPK   197           Creatinine       1.9       Creatinine, Random Ur         110.2  Comment:  The random urine reference ranges provided were established   for 24 hour urine collections.  No reference ranges exist for  random urine specimens.  Correlate clinically.       CRP   153.3           Differential Method       Automated       eGFR if        36       eGFR if non        31  Comment:  Calculation used to obtain the estimated glomerular filtration  rate (eGFR) is the CKD-EPI equation.          Eos #       0.1       Eosinophil%       2.8       Glucose       76       Glucose, UA         Negative     Gran # (ANC)       3.2       Gran%       65.6       Hematocrit       28.6       Hemoglobin       9.7       Hyaline Casts, UA         0     Ketones, UA         Negative     LD   337  Comment:  Results are increased in hemolyzed samples.           Leukocytes, UA         Trace     Lymph #       0.9       Lymph%       17.9       Magnesium   2.4           MCH       29.3       MCHC       33.9       MCV       86       Microscopic Comment         SEE COMMENT  Comment:  Other formed elements not mentioned in the report are not   present in the microscopic examination.        Mono #       0.7       Mono%       13.7       MPV       10.5       Nitrite, UA         Negative     Occult Blood UA         Negative     Opiate Scrn, Ur         Negative     pH, UA         6.0     Platelet Estimate       Appears normal       Platelets       330       Potassium       4.1       Total Protein       9.7       Protein, UA         1+  Comment:  Recommend a 24 hour urine protein or a urine   protein/creatinine ratio if globulin induced proteinuria  is  clinically suspected.        Acceptable Yes             Rapid Influenza A Ag Negative             Rapid Influenza B Ag Negative             RBC       3.31       RBC, UA         1     RDW       16.5       Sed Rate   >140           Sodium       136       Specific Gravity, UA         1.020     Specimen UA         Urine, Clean Catch     Squam Epithel, UA         3     Marijuana (THC) Metabolite         Negative     Toxicology Information         SEE COMMENT  Comment:  This screen includes the following classes of drugs at the   listed cut-off:  Benzodiazepines                  200 ng/ml  Methadone                        300 ng/ml  Cocaine metabolite               300 ng/ml  Opiates                          300 ng/ml  Barbiturates                     200 ng/ml  Amphetamines                    1000 ng/ml  Marijuana metabs (THC)            50 ng/ml  Phencyclidine (PCP)               25 ng/ml  High concentrations of Diphenhydramine may cross-react with  Phencyclidine PCP screening immunoassay giving a false   positive result.  High concentrations of Methylenedioxymethamphetamine (MDMA aka  Ectasy) and other structurally similar compounds may cross-   react with the Amphetamine/Methamphetamine screening   immunoassay giving a false positive result.  A metabolite of the anti-HIV drug Sustiva () may cause  false positive results in the Marijuana metabolite (THC)   screening assay.  Note: This exception list includes only more common   interferants in toxicology screen testing.  Because of many   cross-reactantspositive results on toxicology drug screens   should be confirmed whenever results do not correlate with   clinical presentation.  This report is intended for use in clinical monitoring and  management of patients. It is not intended for use in   employment related drug testing.  Because of any cross-reactants, positive results on toxicology  drug screens should be confirmed whenever results do  not  correlate with clinical presentation.  Presumptive positive results are unconfirmed and may be used   only for medical purposes.       Troponin I   0.016  Comment:  The reference interval for Troponin I represents the 99th percentile   cutoff   for our facility and is consistent with 3rd generation assay   performance.             TSH       0.898       Urobilinogen, UA         Negative     WBC, UA         1     WBC       4.96                          All pertinent labs within the past 24 hours have been reviewed.    Significant Imaging: I have reviewed all pertinent imaging results/findings within the past 24 hours.    Assessment/Plan:     Substance induced mood disorder    Patient with substance induced depression and suicidal ideations.  Will need to be treated.  Start sertraline 25 mg daily targeting depression.  Agree with PEC and one-to-one sitter because of suicidal ideation.  Notify corner of commitment process.  Seek acute inpatient psychiatric facility when medically cleared.  Hopefully as she clears up from her medical problems and drug use, her suicidal intent will also lessen.  Should she have any non redirectable or psychotic agitation, utilize olanzapine 10 mg p.o./IM every 8 hr p.r.n..  She does not have enough evidence for schizophrenia diagnosis.     Opioid use disorder, severe, dependence    Ongoing and chronic opioid use.  Needs rehab and counseling but is not amenable at this time.  Should not be provided controlled substances given her significant drug problem.     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  Not sure of how good her baseline will get because of her comorbid significant drug problem.  Although cared for in the home setting, she apparently is still able to have access to her drugs.  She is not caring for herself, physically or mentally.  Consideration will need to be given in  the future as to if her home setting is appropriate to her medical needs.     Cocaine use disorder, severe, dependence    Ongoing and chronic cocaine use.  Needs rehab and counseling but is not amenable at this time.  Should not be provided controlled substances given her significant drug problem.          Total Time:  60 minutes      En Thompson MD   Psychiatry  [facility]

## 2019-02-05 NOTE — ASSESSMENT & PLAN NOTE
CD4= 22/2.9% on 1/2019. Viral load positive  Not on ART at home  On last admit 1/19-1/27, she had thorough workup for AMS that was all negative. Infectious workup reviewed. Cause of AMS attributed to HIV dementia. She is not altered on admit.   Was discharged on bactrim for ppx- unclear if she has been taking it, but if she has, that may explain elevated Cr on presentation. Hold bactrim for now while waiting for repeat Cr in AM. May be able to resume bactrim 3x/week if renal function improved

## 2019-02-05 NOTE — ASSESSMENT & PLAN NOTE
Likely the cause of her suicidal ideation on admit  Psych consulted  Patient PEC'd  Sertraline 25mg daily started per Psych recs

## 2019-02-05 NOTE — ASSESSMENT & PLAN NOTE
No active issues. She is oriented x3 and mostly appropriate, though malingering for IV dilaudid, in her communication. Monitor.

## 2019-02-05 NOTE — SUBJECTIVE & OBJECTIVE
Past Medical History:   Diagnosis Date    Addiction to drug     AIDS     Blind right eye     Cocaine use     HIV dementia     Malnutrition     Non compliance with medical treatment     Pancreatitis     Pericarditis 02/05/2019    Psychiatric problem     TB (pulmonary tuberculosis)     Tobacco abuse        Past Surgical History:   Procedure Laterality Date    ABSCESS DRAINAGE N/A 06/02/2015    mid uppeer lip    APPENDECTOMY      ASPIRATION ABSCESS FACE N/A 6/2/2015    Performed by [facility] OR    BRONCHOSCOPY N/A 7/26/2016    Performed by Jamil Ryder MD at [facility] ENDO    CORNEAL TRANSPLANT Right        Review of patient's allergies indicates:  No Known Allergies    No current facility-administered medications on file prior to encounter.      Current Outpatient Medications on File Prior to Encounter   Medication Sig    acetaminophen (TYLENOL) 325 MG tablet Take 2 tablets (650 mg total) by mouth every 4 (four) hours as needed for Pain or Temperature greater than (100).    busPIRone (BUSPAR) 10 MG tablet Take 10 mg by mouth 3 (three) times daily.    citalopram (CELEXA) 20 MG tablet Take 1 tablet (20 mg total) by mouth once daily.    cyproheptadine (,PERIACTIN,) 2 mg/5 mL syrup Take by mouth every 8 (eight) hours.    ergocalciferol (VITAMIN D2) 50,000 unit Cap Take 50,000 Units by mouth every 7 days.    ferrous sulfate (FEOSOL) 325 mg (65 mg iron) Tab tablet Take 325 mg by mouth daily with breakfast.    fluconazole (DIFLUCAN) 200 MG Tab Take 2 tablets (400 mg total) by mouth once daily. for 10 days    hydrocortisone 1 % cream Apply topically 2 (two) times daily.    hydroxychloroquine (PLAQUENIL) 200 mg tablet Take 200 mg by mouth 2 (two) times daily.    hydrOXYzine HCl (ATARAX) 25 MG tablet Take 25 mg by mouth 3 (three) times daily.    ibuprofen (ADVIL,MOTRIN) 600 MG tablet Take 1 tablet (600 mg total) by mouth every 6 (six) hours as needed for Pain.    lactulose  (CHRONULAC) 10 gram/15 mL solution Take by mouth 3 (three) times daily.    lipase-protease-amylase (CREON) 3,000-9,500- 15,000 unit CpDR Take 1 capsule by mouth 2 (two) times daily.    megestrol (MEGACE) 40 MG Tab Take 1 tablet (40 mg total) by mouth once daily.    miconazole (MICOTIN) 2 % vaginal cream Place 1 applicator vaginally every evening.    montelukast (SINGULAIR) 10 mg tablet Take 10 mg by mouth every evening.    nicotine (NICODERM CQ) 21 mg/24 hr Place 1 patch onto the skin once daily.    omeprazole (PRILOSEC) 20 MG capsule Take 20 mg by mouth once daily.    ondansetron (ZOFRAN) 8 MG tablet Take 8 mg by mouth every 8 (eight) hours.    sulfamethoxazole-trimethoprim 800-160mg (BACTRIM DS) 800-160 mg Tab Take 1 tablet by mouth every Mon, Wed, Fri.     Family History     Problem Relation (Age of Onset)    COPD Mother    Cancer Maternal Grandmother    Emphysema Mother    Hypertension Mother        Tobacco Use    Smoking status: Current Every Day Smoker     Packs/day: 0.50     Years: 20.00     Pack years: 10.00    Smokeless tobacco: Never Used   Substance and Sexual Activity    Alcohol use: Yes     Alcohol/week: 0.0 oz     Comment: 2/5/19: per step father a 1/2 pint a day, drink around mid January 2019     Drug use: Yes     Frequency: 1.0 times per week     Types: Cocaine, Benzodiazepines, Marijuana, Methamphetamines, Oxycodone, Hydrocodone     Comment: 2/5/19: last used cocaine a few days ago; frequent methadone and pain pill user (purchases from the street)    Sexual activity: Not on file     Review of Systems   Constitutional: Negative for activity change, appetite change, chills, fatigue and fever.   HENT: Negative for congestion, nosebleeds, postnasal drip, rhinorrhea, sinus pressure, sinus pain, sore throat and trouble swallowing.    Eyes: Negative for visual disturbance.   Respiratory: Positive for cough. Negative for chest tightness, shortness of breath and wheezing.    Cardiovascular:  Negative for chest pain, palpitations and leg swelling.   Gastrointestinal: Negative for abdominal pain, constipation, diarrhea, nausea and vomiting.   Genitourinary: Negative for decreased urine volume, difficulty urinating, dysuria, frequency and urgency.   Musculoskeletal: Negative for arthralgias and myalgias.   Skin: Positive for rash. Negative for wound.   Neurological: Negative for dizziness, weakness, light-headedness, numbness and headaches.   Psychiatric/Behavioral: Negative for agitation, confusion, hallucinations and suicidal ideas. The patient is not nervous/anxious.      Objective:     Vital Signs (Most Recent):  Temp: 98 °F (36.7 °C) (02/05/19 1630)  Pulse: 98 (02/05/19 1630)  Resp: 19 (02/05/19 1630)  BP: 112/82 (02/05/19 1630)  SpO2: 100 % (02/05/19 1630) Vital Signs (24h Range):  Temp:  [97.9 °F (36.6 °C)-98.9 °F (37.2 °C)] 98 °F (36.7 °C)  Pulse:  [] 98  Resp:  [19-22] 19  SpO2:  [99 %-100 %] 100 %  BP: (101-112)/(65-82) 112/82     Weight: 49.9 kg (110 lb)  Body mass index is 21.48 kg/m².    Physical Exam   Constitutional: She is oriented to person, place, and time. No distress.   Thin woman   HENT:   Head: Normocephalic and atraumatic.   Nose: Nose normal.   Mouth/Throat: Oropharynx is clear and moist. No oropharyngeal exudate.   Eyes: Conjunctivae and EOM are normal. No scleral icterus.   Neck: Neck supple. No JVD present. No thyromegaly present.   Cardiovascular: Normal rate, regular rhythm, normal heart sounds and intact distal pulses. Exam reveals no gallop and no friction rub.   No murmur heard.  Pulmonary/Chest: Effort normal and breath sounds normal. No stridor. No respiratory distress. She has no wheezes. She has no rales.   Abdominal: Soft. Bowel sounds are normal. She exhibits no distension and no mass. There is no tenderness. There is no guarding.   Musculoskeletal: She exhibits no edema.   Lymphadenopathy:     She has no cervical adenopathy.   Neurological: She is alert and  oriented to person, place, and time. No cranial nerve deficit or sensory deficit.   Skin: Skin is warm and dry. Rash (hyperpigmented macular rash) noted. She is not diaphoretic.   Nursing note and vitals reviewed.        CRANIAL NERVES     CN III, IV, VI   Extraocular motions are normal.        Significant Labs: All pertinent labs within the past 24 hours have been reviewed.    Significant Imaging: I have reviewed and interpreted all pertinent imaging results/findings within the past 24 hours.

## 2019-02-05 NOTE — ASSESSMENT & PLAN NOTE
Positive on admit  Admits to crack cocaine use. Denies injecting and snorting  Counseled on cessation  Avoid scheduled medications

## 2019-02-05 NOTE — ASSESSMENT & PLAN NOTE
Patient was counseled on smoking cessation for 4 minutes. We discussed how smoking is detrimental to the patient's health. Prescription for nicotine patch was offered.

## 2019-02-05 NOTE — ASSESSMENT & PLAN NOTE
Ongoing and chronic opioid use.  Needs rehab and counseling but is not amenable at this time.  Should not be provided controlled substances given her significant drug problem.

## 2019-02-06 LAB
ANION GAP SERPL CALC-SCNC: 5 MMOL/L
AORTIC ROOT ANNULUS: 2.71 CM
AORTIC VALVE CUSP SEPERATION: 1.97 CM
ASCENDING AORTA: 2.64 CM
AV INDEX (PROSTH): 0.73
AV MEAN GRADIENT: 2.98 MMHG
AV PEAK GRADIENT: 5.11 MMHG
AV VALVE AREA: 2.43 CM2
AV VELOCITY RATIO: 0.78
BASOPHILS # BLD AUTO: 0.02 K/UL
BASOPHILS NFR BLD: 0.7 %
BSA FOR ECHO PROCEDURE: 1.45 M2
BUN SERPL-MCNC: 27 MG/DL
CALCIUM SERPL-MCNC: 8.8 MG/DL
CHLORIDE SERPL-SCNC: 113 MMOL/L
CO2 SERPL-SCNC: 17 MMOL/L
CREAT SERPL-MCNC: 1.1 MG/DL
CV ECHO LV RWT: 0.48 CM
DIFFERENTIAL METHOD: ABNORMAL
DOP CALC AO PEAK VEL: 1.13 M/S
DOP CALC AO VTI: 18.2 CM
DOP CALC LVOT AREA: 3.33 CM2
DOP CALC LVOT DIAMETER: 2.06 CM
DOP CALC LVOT PEAK VEL: 0.88 M/S
DOP CALC LVOT STROKE VOLUME: 44.17 CM3
DOP CALCLVOT PEAK VEL VTI: 13.26 CM
E WAVE DECELERATION TIME: 161.22 MSEC
E/A RATIO: 0.81
E/E' RATIO: 4.2
ECHO LV POSTERIOR WALL: 0.95 CM (ref 0.6–1.1)
EOSINOPHIL # BLD AUTO: 0.1 K/UL
EOSINOPHIL NFR BLD: 4.1 %
ERYTHROCYTE [DISTWIDTH] IN BLOOD BY AUTOMATED COUNT: 16.6 %
EST. GFR  (AFRICAN AMERICAN): >60 ML/MIN/1.73 M^2
EST. GFR  (NON AFRICAN AMERICAN): >60 ML/MIN/1.73 M^2
FRACTIONAL SHORTENING: 38 % (ref 28–44)
GLUCOSE SERPL-MCNC: 69 MG/DL
HCT VFR BLD AUTO: 24 %
HGB BLD-MCNC: 8.1 G/DL
INTERVENTRICULAR SEPTUM: 1.03 CM (ref 0.6–1.1)
IVRT: 0.09 MSEC
LA MAJOR: 3.92 CM
LA MINOR: 3.67 CM
LA WIDTH: 3.45 CM
LEFT ATRIUM SIZE: 3.13 CM
LEFT ATRIUM VOLUME INDEX: 24 ML/M2
LEFT ATRIUM VOLUME: 34.8 CM3
LEFT INTERNAL DIMENSION IN SYSTOLE: 2.48 CM (ref 2.1–4)
LEFT VENTRICLE DIASTOLIC VOLUME INDEX: 47.9 ML/M2
LEFT VENTRICLE DIASTOLIC VOLUME: 69.35 ML
LEFT VENTRICLE MASS INDEX: 85.8 G/M2
LEFT VENTRICLE SYSTOLIC VOLUME INDEX: 15.2 ML/M2
LEFT VENTRICLE SYSTOLIC VOLUME: 21.98 ML
LEFT VENTRICULAR INTERNAL DIMENSION IN DIASTOLE: 3.98 CM (ref 3.5–6)
LEFT VENTRICULAR MASS: 124.29 G
LV LATERAL E/E' RATIO: 4.2
LV SEPTAL E/E' RATIO: 4.2
LYMPHOCYTES # BLD AUTO: 0.8 K/UL
LYMPHOCYTES NFR BLD: 27.5 %
MCH RBC QN AUTO: 29.6 PG
MCHC RBC AUTO-ENTMCNC: 33.8 G/DL
MCV RBC AUTO: 88 FL
MONOCYTES # BLD AUTO: 0.6 K/UL
MONOCYTES NFR BLD: 21.4 %
MV PEAK A VEL: 0.52 M/S
MV PEAK E VEL: 0.42 M/S
NEUTROPHILS # BLD AUTO: 1.4 K/UL
NEUTROPHILS NFR BLD: 46.3 %
PISA TR MAX VEL: 2.35 M/S
PLATELET # BLD AUTO: 274 K/UL
PMV BLD AUTO: 10.5 FL
POTASSIUM SERPL-SCNC: 4.5 MMOL/L
PULM VEIN S/D RATIO: 0.82
PV PEAK D VEL: 0.39 M/S
PV PEAK S VEL: 0.32 M/S
PV PEAK VELOCITY: 0.96 CM/S
RA MAJOR: 3.3 CM
RA PRESSURE: 3 MMHG
RA WIDTH: 3.46 CM
RBC # BLD AUTO: 2.74 M/UL
RIGHT VENTRICULAR END-DIASTOLIC DIMENSION: 3.82 CM
RV TISSUE DOPPLER FREE WALL SYSTOLIC VELOCITY 1 (APICAL 4 CHAMBER VIEW): 11.59 M/S
SINUS: 2.87 CM
SODIUM SERPL-SCNC: 135 MMOL/L
STJ: 2.38 CM
TDI LATERAL: 0.1
TDI SEPTAL: 0.1
TDI: 0.1
TR MAX PG: 22.09 MMHG
TRICUSPID ANNULAR PLANE SYSTOLIC EXCURSION: 1.56 CM
TROPONIN I SERPL DL<=0.01 NG/ML-MCNC: <0.006 NG/ML
TV REST PULMONARY ARTERY PRESSURE: 25 MMHG
WBC # BLD AUTO: 2.95 K/UL

## 2019-02-06 PROCEDURE — 36415 COLL VENOUS BLD VENIPUNCTURE: CPT

## 2019-02-06 PROCEDURE — 99232 SBSQ HOSP IP/OBS MODERATE 35: CPT | Mod: 25,,, | Performed by: INTERNAL MEDICINE

## 2019-02-06 PROCEDURE — 25000003 PHARM REV CODE 250: Performed by: HOSPITALIST

## 2019-02-06 PROCEDURE — 99231 PR SUBSEQUENT HOSPITAL CARE,LEVL I: ICD-10-PCS | Mod: ,,, | Performed by: NURSE PRACTITIONER

## 2019-02-06 PROCEDURE — S4991 NICOTINE PATCH NONLEGEND: HCPCS | Performed by: HOSPITALIST

## 2019-02-06 PROCEDURE — 99231 SBSQ HOSP IP/OBS SF/LOW 25: CPT | Mod: ,,, | Performed by: NURSE PRACTITIONER

## 2019-02-06 PROCEDURE — 80048 BASIC METABOLIC PNL TOTAL CA: CPT

## 2019-02-06 PROCEDURE — 21400001 HC TELEMETRY ROOM

## 2019-02-06 PROCEDURE — 85025 COMPLETE CBC W/AUTO DIFF WBC: CPT

## 2019-02-06 PROCEDURE — 99233 SBSQ HOSP IP/OBS HIGH 50: CPT | Mod: ,,, | Performed by: INTERNAL MEDICINE

## 2019-02-06 PROCEDURE — 99232 PR SUBSEQUENT HOSPITAL CARE,LEVL II: ICD-10-PCS | Mod: 25,,, | Performed by: INTERNAL MEDICINE

## 2019-02-06 PROCEDURE — 99233 PR SUBSEQUENT HOSPITAL CARE,LEVL III: ICD-10-PCS | Mod: ,,, | Performed by: INTERNAL MEDICINE

## 2019-02-06 PROCEDURE — C9113 INJ PANTOPRAZOLE SODIUM, VIA: HCPCS | Performed by: EMERGENCY MEDICINE

## 2019-02-06 PROCEDURE — 63600175 PHARM REV CODE 636 W HCPCS: Performed by: EMERGENCY MEDICINE

## 2019-02-06 PROCEDURE — 25000003 PHARM REV CODE 250: Performed by: EMERGENCY MEDICINE

## 2019-02-06 RX ORDER — SULFAMETHOXAZOLE AND TRIMETHOPRIM 800; 160 MG/1; MG/1
1 TABLET ORAL
Status: DISCONTINUED | OUTPATIENT
Start: 2019-02-06 | End: 2019-02-07 | Stop reason: HOSPADM

## 2019-02-06 RX ORDER — BENZONATATE 100 MG/1
100 CAPSULE ORAL 3 TIMES DAILY PRN
Status: DISCONTINUED | OUTPATIENT
Start: 2019-02-06 | End: 2019-02-07 | Stop reason: HOSPADM

## 2019-02-06 RX ORDER — DIPHENHYDRAMINE HCL 25 MG
25 CAPSULE ORAL EVERY 6 HOURS PRN
Status: DISCONTINUED | OUTPATIENT
Start: 2019-02-06 | End: 2019-02-07 | Stop reason: HOSPADM

## 2019-02-06 RX ADMIN — CLONIDINE HYDROCHLORIDE 0.1 MG: 0.1 TABLET ORAL at 10:02

## 2019-02-06 RX ADMIN — PANCRELIPASE LIPASE, PANCRELIPASE PROTEASE, PANCRELIPASE AMYLASE 1 CAPSULE: 5000; 17000; 24000 CAPSULE, DELAYED RELEASE ORAL at 08:02

## 2019-02-06 RX ADMIN — SODIUM CHLORIDE: 0.9 INJECTION, SOLUTION INTRAVENOUS at 03:02

## 2019-02-06 RX ADMIN — BENZONATATE 100 MG: 100 CAPSULE ORAL at 12:02

## 2019-02-06 RX ADMIN — SULFAMETHOXAZOLE AND TRIMETHOPRIM 1 TABLET: 800; 160 TABLET ORAL at 08:02

## 2019-02-06 RX ADMIN — RAMELTEON 8 MG: 8 TABLET, FILM COATED ORAL at 10:02

## 2019-02-06 RX ADMIN — SERTRALINE HYDROCHLORIDE 25 MG: 25 TABLET ORAL at 08:02

## 2019-02-06 RX ADMIN — ACETAMINOPHEN 650 MG: 325 TABLET ORAL at 03:02

## 2019-02-06 RX ADMIN — BENZONATATE 100 MG: 100 CAPSULE ORAL at 05:02

## 2019-02-06 RX ADMIN — DIPHENHYDRAMINE HYDROCHLORIDE 25 MG: 25 CAPSULE ORAL at 01:02

## 2019-02-06 RX ADMIN — PANCRELIPASE LIPASE, PANCRELIPASE PROTEASE, PANCRELIPASE AMYLASE 1 CAPSULE: 5000; 17000; 24000 CAPSULE, DELAYED RELEASE ORAL at 01:02

## 2019-02-06 RX ADMIN — ACETAMINOPHEN 650 MG: 325 TABLET ORAL at 10:02

## 2019-02-06 RX ADMIN — RAMELTEON 8 MG: 8 TABLET, FILM COATED ORAL at 12:02

## 2019-02-06 RX ADMIN — PANCRELIPASE LIPASE, PANCRELIPASE PROTEASE, PANCRELIPASE AMYLASE 1 CAPSULE: 5000; 17000; 24000 CAPSULE, DELAYED RELEASE ORAL at 04:02

## 2019-02-06 RX ADMIN — COLCHICINE 0.6 MG: 0.6 TABLET, FILM COATED ORAL at 08:02

## 2019-02-06 RX ADMIN — CLONIDINE HYDROCHLORIDE 0.1 MG: 0.1 TABLET ORAL at 08:02

## 2019-02-06 RX ADMIN — BENZONATATE 100 MG: 100 CAPSULE ORAL at 10:02

## 2019-02-06 RX ADMIN — PANTOPRAZOLE SODIUM 40 MG: 40 INJECTION, POWDER, FOR SOLUTION INTRAVENOUS at 10:02

## 2019-02-06 RX ADMIN — NICOTINE 1 PATCH: 21 PATCH, EXTENDED RELEASE TRANSDERMAL at 08:02

## 2019-02-06 NOTE — ASSESSMENT & PLAN NOTE
At one point complained of chest pain that is not very well described  EKG with diffuse ST elevations  Troponin not elevated  ESR and CRP elevated on admit  TTE pending  May be viral in etiology- patient did have URI symptoms and cough on admit  Given colchicine 0.6mg in ED. Continue colchicine 0.6mg daily  Cardiololgy consulted and ID consulted for pericarditis in setting of HIV/AIDS

## 2019-02-06 NOTE — PLAN OF CARE
Problem: Adult Inpatient Plan of Care  Goal: Plan of Care Review  Recommendations    Encourage adequate meal and oral supplement intake to meet energy and protein needs  Goals: >50% meal intake daily  Nutrition Goal Status: new

## 2019-02-06 NOTE — PLAN OF CARE
Patient was calm and cooperative for entire shift. Patient's cough was keeping her awake; MD prescribed tessalon pears with good effect. No other significant changes this shift.     Problem: Suicide Risk  Goal: Absence of Self-Harm  Outcome: Ongoing (interventions implemented as appropriate)  Intervention: Assess Risk to Self and Maintain Safety   02/06/19 0436   Support Psychosocial Response to Acute Illness   Safety/Security Measures bed alarm set; at bedside   Provide Emotional and Physical Safety   Behavior Management boundaries reinforced;security enhancements provided     Intervention: Promote Psychosocial Wellbeing   02/06/19 0436   Promote Anxiety Reduction   Family/Support System Care self-care encouraged;support provided   Supportive Measures active listening utilized;decision-making supported;positive reinforcement provided;problem solving facilitated;self-care encouraged;verbalization of feelings encouraged

## 2019-02-06 NOTE — HOSPITAL COURSE
"02/06/2019  Jayden Marcial presents right side lying wearing paper hospital scrubs. 1:1 sitter is at the bedside. Jayden is asleep but easily aroused. She is drowsy throughout the interview and falls back to sleep frequently, so the interview is cut short. She states that she is not suicidal, but wants "medicine." She denies pain but states that she wants something for itching. Nurse Johnna is notified. Jayden is oriented to person, place and situation. Not assessed for orientation to time due to falling asleep and focus on wanting medication. She is no longer endorses suicidal ideation, she denies homicidal ideation and she is not gravely disabled. She no longer meets criteria for a PEC/CEC. Dr. Lima is notified and discontinues the commitment.   "

## 2019-02-06 NOTE — CONSULTS
"[facility]  Infectious Disease  Consult Note    Patient Name: Jayden Marcial  MRN: [id number]  Admission Date: 2/5/2019  Hospital Length of Stay: 1 days  Attending Physician: Irina Lima MD  Primary Care Provider: [facility]     Isolation Status: No active isolations    Patient information was obtained from patient and past medical records.      Inpatient consult to Infectious Diseases  Consult performed by: JANESSA Armstrong  Consult ordered by: Lev Oliva MD        Assessment/Plan:     AIDS    - not currently compliant with therapy  - had infectious work up on last admission; being followed by ID provider at outside facility  - EKG with diffuse ST elevation; troponin elevated; evaluated by cards  - pt afebrile, no leukocytosis; had cough and cold symptoms. Could be a viral pericarditis?    Plan:  1. Would not start ART therapy at this time; according to note from Dr. Reagan, pt has seen outside HIV provider, Dr. Bui and father is wishing to pursue hospice/comfort care; would consider nursing home placement for directly observed therapy  2. Continue colchicine and supportive care for pericarditis; no pericardial effusion seen  3. Continue bactrim DS three times weekly for OI prophylaxis  4. Will sign off; needs f/u with HIV Provider upon discharge     Pt Seen and discussed with Dr. Max  Thank you for your consult. I will sign off. Please contact us if you have any additional questions.  JANESSA Devries Pager: [phone]  Infectious Disease  [facility]    Subjective:     Principal Problem: Pericarditis    HPI: This is a 44 y/o female with HIV/AIDS, HIV dementia, depression, polysubstance abuse who presents with pain. Per Psych notes, she was actually taken to ED as "patient wandering the streets and stating that she has been hearing voices to kill herself." Pt endorsed chest pain in the ED. EKG showed diffuse ST segment elevation. " Troponin negative. 2D echo is pending. Patient started on colchicine for pericarditis.  She denies shortness of breath and fevers. She does have a cough.    Per ID note from Dr. Reagan, patient saw Dr. Bui for ID follow up on 1/31 and had a 90 minute visit with her and step-father. According to her father, her dementia is worsening and she her substance abuse continues. They are considering hospice.     Patient had extensive infectious work up during her last admission in January. Quant gold was indeterminant. Coccidioides returned positive. Patient currently on bactrim prophylaxis three times weekly.     CRP- 153; ESR >140 on admit.     Pt lethargic and not answering questions on my exam.      Past Medical History:   Diagnosis Date    Addiction to drug     AIDS     Blind right eye     Cocaine use     HIV dementia     Malnutrition     Non compliance with medical treatment     Pancreatitis     Pericarditis 02/05/2019    Psychiatric problem     TB (pulmonary tuberculosis)     Tobacco abuse        Past Surgical History:   Procedure Laterality Date    ABSCESS DRAINAGE N/A 06/02/2015    mid uppeer lip    APPENDECTOMY      ASPIRATION ABSCESS FACE N/A 6/2/2015    Performed by [facility] at [facility] OR    BRONCHOSCOPY N/A 7/26/2016    Performed by Jamil Ryder MD at [facility] ENDO    CORNEAL TRANSPLANT Right        Review of patient's allergies indicates:  No Known Allergies    Medications:  Medications Prior to Admission   Medication Sig    acetaminophen (TYLENOL) 325 MG tablet Take 2 tablets (650 mg total) by mouth every 4 (four) hours as needed for Pain or Temperature greater than (100).    busPIRone (BUSPAR) 10 MG tablet Take 10 mg by mouth 3 (three) times daily.    citalopram (CELEXA) 20 MG tablet Take 1 tablet (20 mg total) by mouth once daily.    cyproheptadine (,PERIACTIN,) 2 mg/5 mL syrup Take by mouth every 8 (eight) hours.    ergocalciferol (VITAMIN D2) 50,000 unit Cap Take 50,000  Units by mouth every 7 days.    ferrous sulfate (FEOSOL) 325 mg (65 mg iron) Tab tablet Take 325 mg by mouth daily with breakfast.    fluconazole (DIFLUCAN) 200 MG Tab Take 2 tablets (400 mg total) by mouth once daily. for 10 days    hydrocortisone 1 % cream Apply topically 2 (two) times daily.    hydroxychloroquine (PLAQUENIL) 200 mg tablet Take 200 mg by mouth 2 (two) times daily.    hydrOXYzine HCl (ATARAX) 25 MG tablet Take 25 mg by mouth 3 (three) times daily.    ibuprofen (ADVIL,MOTRIN) 600 MG tablet Take 1 tablet (600 mg total) by mouth every 6 (six) hours as needed for Pain.    lactulose (CHRONULAC) 10 gram/15 mL solution Take by mouth 3 (three) times daily.    lipase-protease-amylase (CREON) 3,000-9,500- 15,000 unit CpDR Take 1 capsule by mouth 2 (two) times daily.    megestrol (MEGACE) 40 MG Tab Take 1 tablet (40 mg total) by mouth once daily.    miconazole (MICOTIN) 2 % vaginal cream Place 1 applicator vaginally every evening.    montelukast (SINGULAIR) 10 mg tablet Take 10 mg by mouth every evening.    nicotine (NICODERM CQ) 21 mg/24 hr Place 1 patch onto the skin once daily.    omeprazole (PRILOSEC) 20 MG capsule Take 20 mg by mouth once daily.    ondansetron (ZOFRAN) 8 MG tablet Take 8 mg by mouth every 8 (eight) hours.    sulfamethoxazole-trimethoprim 800-160mg (BACTRIM DS) 800-160 mg Tab Take 1 tablet by mouth every Mon, Wed, Fri.     Antibiotics (From admission, onward)    Start     Stop Route Frequency Ordered    02/06/19 0915  sulfamethoxazole-trimethoprim 800-160mg per tablet 1 tablet      -- Oral Every Mon, Wed, Fri 02/06/19 0810        Antifungals (From admission, onward)    None        Antivirals (From admission, onward)    None           Immunization History   Administered Date(s) Administered    Influenza - Quadrivalent - PF 11/30/2016    Pneumococcal Polysaccharide - 23 Valent 04/29/2013       Family History     Problem Relation (Age of Onset)    COPD Mother    Cancer  Maternal Grandmother    Emphysema Mother    Hypertension Mother        Social History     Socioeconomic History    Marital status: Single     Spouse name: None    Number of children: None    Years of education: None    Highest education level: None   Social Needs    Financial resource strain: None    Food insecurity - worry: None    Food insecurity - inability: None    Transportation needs - medical: None    Transportation needs - non-medical: None   Occupational History    None   Tobacco Use    Smoking status: Current Every Day Smoker     Packs/day: 0.50     Years: 20.00     Pack years: 10.00    Smokeless tobacco: Never Used   Substance and Sexual Activity    Alcohol use: Yes     Alcohol/week: 0.0 oz     Comment: 2/5/19: per step father a 1/2 pint a day, drink around mid January 2019     Drug use: Yes     Frequency: 1.0 times per week     Types: Cocaine, Benzodiazepines, Marijuana, Methamphetamines, Oxycodone, Hydrocodone     Comment: 2/5/19: last used cocaine a few days ago; frequent methadone and pain pill user (purchases from the street)    Sexual activity: None   Other Topics Concern    Patient feels they ought to cut down on drinking/drug use Not Asked    Patient annoyed by others criticizing their drinking/drug use Not Asked    Patient has felt bad or guilty about drinking/drug use Not Asked    Patient has had a drink/used drugs as an eye opener in the AM Not Asked   Social History Narrative    None     Review of Systems   Unable to perform ROS: Psychiatric disorder     Objective:     Vital Signs (Most Recent):  Temp: 98.3 °F (36.8 °C) (02/06/19 1140)  Pulse: 100 (02/06/19 1140)  Resp: 18 (02/06/19 1140)  BP: 111/65 (02/06/19 1140)  SpO2: 98 % (02/06/19 1140) Vital Signs (24h Range):  Temp:  [97.6 °F (36.4 °C)-99.1 °F (37.3 °C)] 98.3 °F (36.8 °C)  Pulse:  [] 100  Resp:  [16-20] 18  SpO2:  [95 %-100 %] 98 %  BP: ()/(52-82) 111/65     Weight: 49.9 kg (110 lb 0.2 oz)  Body mass  index is 21.48 kg/m².    Estimated Creatinine Clearance: 46.4 mL/min (based on SCr of 1.1 mg/dL).    Physical Exam   Constitutional: She is oriented to person, place, and time. She appears well-developed and well-nourished. She appears cachectic. No distress.   Cardiovascular: Normal rate and regular rhythm.   No murmur heard.  Pulmonary/Chest: Effort normal and breath sounds normal. No respiratory distress.   Abdominal: Soft. Bowel sounds are normal. She exhibits no distension.   Musculoskeletal: Normal range of motion. She exhibits no edema.   Neurological: She is alert and oriented to person, place, and time.   Skin: Skin is warm and dry.   Psychiatric: She has a normal mood and affect. Her behavior is normal.       Significant Labs:   Blood Culture:   Recent Labs   Lab 01/19/19  0935 01/19/19  0950 02/05/19  1419 02/05/19  1427   LABBLOO No growth after 5 days. No growth after 5 days. No Growth to date No Growth to date     CBC:   Recent Labs   Lab 02/05/19  1228 02/06/19  0510   WBC 4.96 2.95*   HGB 9.7* 8.1*   HCT 28.6* 24.0*    274     CMP:   Recent Labs   Lab 02/05/19  1228 02/06/19  0510    135*   K 4.1 4.5    113*   CO2 18* 17*   GLU 76 69*   BUN 45* 27*   CREATININE 1.9* 1.1   CALCIUM 9.8 8.8   PROT 9.7*  --    ALBUMIN 3.0*  --    BILITOT 0.2  --    ALKPHOS 82  --    AST 41*  --    ALT 44  --    ANIONGAP 9 5*   EGFRNONAA 31* >60     HIV 1/2 Antibodies: No results for input(s): [id number] in the last 48 hours.  HIV Rapid: No results for input(s): HIVRAPID in the last 48 hours.    Significant Imaging: I have reviewed all pertinent imaging results/findings within the past 24 hours.

## 2019-02-06 NOTE — PROGRESS NOTES
"[facility] [place] [facility]  Progress Note    Patient Name: Jayden Marcial  MRN: [id number]  Patient Class: IP- Inpatient   Admission Date: 2/5/2019  Length of Stay: 1 days  Attending Physician: Irina Lima MD  Primary Care Provider: [facility]        Subjective:     Principal Problem:Pericarditis    HPI:  Ms Jayden Marcial is a 43 y.o. woman with HIV/AIDS, HIV dementia, depression, polysubstance abuse who presents with "pain all over." Per Psych notes, she was actually taken to ED "patient wandering the streets and stating that she has been hearing voices to kill herself." She currently says that she is sometimes depressed and anxious but that she does not have any plans to kill herself or anyone else. She continues to complain of pain all over. She does not specify any chest pain currently, but previously in ED she did describe chest pain. She denies shortness of breath and fevers. She does have a cough productive of white sputum. Her adherence to mediations is questionable. She smokes crack cocaine and uses opioids. She requests IV dilaudid and IV benadryl to "knock [her] out" so that she will sleep tonight.     In ED, EKG showed diffuse ST segment elevation. Troponin not elevated. TTE pending. CRP and ESR elevated. Psych consulted and patient PEC'd.         Hospital Course:  Admitted for acute pericarditis and suicidal ideation. Also had MARILEE on admit that resolved with IVF. Started on colchicine for acute pericarditis. Cardiology consulted. TTE pending. Psychiatry consulted for suicidal ideation which is most likely related to polysubstance abuse. Started sertraline.     Interval History: Asking for her medicine this morning. No specific complaints but when pressed does say she has chest pain in the center of her chest that does not change with position or inspiration. Also says she has nausea.     Review of Systems   Constitutional: Negative for chills and fever. "   Respiratory: Negative for cough, chest tightness and shortness of breath.    Cardiovascular: Positive for chest pain. Negative for leg swelling.   Gastrointestinal: Positive for nausea. Negative for abdominal pain, constipation, diarrhea and vomiting.   Genitourinary: Negative for difficulty urinating.   Musculoskeletal: Negative for arthralgias and myalgias.   Skin: Positive for rash.   Neurological: Negative for dizziness, weakness, light-headedness, numbness and headaches.     Objective:     Vital Signs (Most Recent):  Temp: 97.6 °F (36.4 °C) (02/06/19 0743)  Pulse: 91 (02/06/19 0743)  Resp: 18 (02/06/19 0743)  BP: 108/72 (02/06/19 0743)  SpO2: 98 % (02/06/19 0743) Vital Signs (24h Range):  Temp:  [97.6 °F (36.4 °C)-98.9 °F (37.2 °C)] 97.6 °F (36.4 °C)  Pulse:  [] 91  Resp:  [16-22] 18  SpO2:  [95 %-100 %] 98 %  BP: ()/(58-82) 108/72     Weight: 49.9 kg (110 lb)  Body mass index is 21.48 kg/m².    Intake/Output Summary (Last 24 hours) at 2/6/2019 0811  Last data filed at 2/6/2019 0700  Gross per 24 hour   Intake 2578.33 ml   Output --   Net 2578.33 ml      Physical Exam   Constitutional: She is oriented to person, place, and time. No distress.   Thin ill appearing woman   HENT:   Head: Normocephalic and atraumatic.   Nose: Nose normal.   Mouth/Throat: Oropharynx is clear and moist.   No white plaques in oropharynx   Cardiovascular: Normal rate, regular rhythm and normal heart sounds. Exam reveals no gallop and no friction rub.   No murmur heard.  Pulmonary/Chest: Effort normal and breath sounds normal. No stridor. No respiratory distress. She has no wheezes. She has no rales.   Abdominal: Soft. Bowel sounds are normal. She exhibits no distension and no mass. There is no tenderness. There is no guarding.   Musculoskeletal: She exhibits no edema.   Neurological: She is alert and oriented to person, place, and time.   Skin: Rash (hyperpigmented macular rash on arms) noted. She is not diaphoretic.    Nursing note and vitals reviewed.      Significant Labs: All pertinent labs within the past 24 hours have been reviewed.    Significant Imaging: I have reviewed and interpreted all pertinent imaging results/findings within the past 24 hours.    Assessment/Plan:      * Pericarditis    At one point complained of chest pain that is not very well described  EKG with diffuse ST elevations  Troponin not elevated  ESR and CRP elevated on admit  TTE pending  May be viral in etiology- patient did have URI symptoms and cough on admit  Given colchicine 0.6mg in ED. Continue colchicine 0.6mg daily  Cardiololgy consulted and ID consulted for pericarditis in setting of HIV/AIDS       Acute renal insufficiency    Cr 1.9 on admit from baseline 1.1  UA 1+ protein, trace leukocytes  May be prerenal vs due to bactrim. Hold bactrim for now and start IVF.   Renal function normalized with IVF. Discontinue IVF. 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 She is oriented x3 and mostly appropriate, though malingering for IV dilaudid, in her communication. Monitor.        Cocaine use disorder, severe, dependence    Positive on admit  Admits to crack cocaine use. Denies injecting and snorting  Counseled on cessation  Avoid opiates and benzos     Chronic leukopenia    WBC dropped on day 2 of admit  She has chronic leukopenia due to HIV/AIDS  Monitor        AIDS    CD4= 22/2.9% on 1/2019. Viral load positive  Not on ART at home  On last admit 1/19-1/27, she had thorough workup for AMS that was all negative. Infectious workup reviewed. Cause of AMS attributed to HIV dementia.  She is not altered currently  Was discharged on bactrim for ppx- unclear if she has been taking it. Resume MWF bactrim for PJP PPX           Moderate protein-calorie malnutrition    Body mass index is 21.48 kg/m².  Encourage diet       Tobacco abuse    Patient was counseled on smoking cessation for 4 minutes. We discussed how smoking is detrimental to the patient's health. Prescription for nicotine patch was offered.         Anemia of chronic disorder    Hgb 9.7 on admit, slightly below prior  Likely due to HIV/AIDS  Monitor          VTE Risk Mitigation (From admission, onward)        Ordered     IP VTE LOW RISK PATIENT  Once      02/05/19 1705     Place JOHANN hose  Until discontinued      02/05/19 1705          1:25 PM: Talked to psych. DC PEC as patient is not actively suicidal or hallucinating.     Irina Lima MD  Department of Hospital Medicine   [facility]

## 2019-02-06 NOTE — ASSESSMENT & PLAN NOTE
Cr 1.9 on admit from baseline 1.1  UA 1+ protein, trace leukocytes  May be prerenal vs due to bactrim. Hold bactrim for now and start IVF.   Renal function normalized with IVF. Discontinue IVF. Resume bactrim PPX

## 2019-02-06 NOTE — ASSESSMENT & PLAN NOTE
Patient with substance induced depression and suicidal ideations.  Will need to be treated.  Start sertraline 25 mg daily targeting depression.  Agree with PEC and one-to-one sitter because of suicidal ideation.  Notify corner of commitment process.  Seek acute inpatient psychiatric facility when medically cleared.  Hopefully as she clears up from her medical problems and drug use, her suicidal intent will also lessen.  Should she have any non redirectable or psychotic agitation, utilize olanzapine 10 mg p.o./IM every 8 hr p.r.n..  She does not have enough evidence for schizophrenia diagnosis.    02/06/2019  Patient no longer endorsing suicidal ideation. She also denies homicidal ideation. She is not gravely disabled and it is illegal to commit someone in the state [facility] for substance abuse issues. Therefore, she no longer meets the criteria for a PEC. Continue Sertraline 25 mg po qd. Should she have any non redirectable or psychotic agitation, utilize olanzapine 10 mg p.o./IM every 8 hr p.r.n..

## 2019-02-06 NOTE — NURSING
Received report from night nurse. Pt has no sign of distress. Safety precautions are maintained with bed alarm set, bed in lowest position, bed wheels locked, and call light in reach. Safety sitter @ bedside.

## 2019-02-06 NOTE — SUBJECTIVE & OBJECTIVE
Past Medical History:   Diagnosis Date    Addiction to drug     AIDS     Blind right eye     Cocaine use     HIV dementia     Malnutrition     Non compliance with medical treatment     Pancreatitis     Pericarditis 02/05/2019    Psychiatric problem     TB (pulmonary tuberculosis)     Tobacco abuse        Past Surgical History:   Procedure Laterality Date    ABSCESS DRAINAGE N/A 06/02/2015    mid uppeer lip    APPENDECTOMY      ASPIRATION ABSCESS FACE N/A 6/2/2015    Per[facility] by [facility] at [facility] OR    BRONCHOSCOPY N/A 7/26/2016    Performed by Jamil Ryder MD at [facility] ENDO    CORNEAL TRANSPLANT Right        Review of patient's allergies indicates:  No Known Allergies    No current facility-administered medications on file prior to encounter.      Current Outpatient Medications on File Prior to Encounter   Medication Sig    acetaminophen (TYLENOL) 325 MG tablet Take 2 tablets (650 mg total) by mouth every 4 (four) hours as needed for Pain or Temperature greater than (100).    busPIRone (BUSPAR) 10 MG tablet Take 10 mg by mouth 3 (three) times daily.    citalopram (CELEXA) 20 MG tablet Take 1 tablet (20 mg total) by mouth once daily.    cyproheptadine (,PERIACTIN,) 2 mg/5 mL syrup Take by mouth every 8 (eight) hours.    ergocalciferol (VITAMIN D2) 50,000 unit Cap Take 50,000 Units by mouth every 7 days.    ferrous sulfate (FEOSOL) 325 mg (65 mg iron) Tab tablet Take 325 mg by mouth daily with breakfast.    fluconazole (DIFLUCAN) 200 MG Tab Take 2 tablets (400 mg total) by mouth once daily. for 10 days    hydrocortisone 1 % cream Apply topically 2 (two) times daily.    hydroxychloroquine (PLAQUENIL) 200 mg tablet Take 200 mg by mouth 2 (two) times daily.    hydrOXYzine HCl (ATARAX) 25 MG tablet Take 25 mg by mouth 3 (three) times daily.    ibuprofen (ADVIL,MOTRIN) 600 MG tablet Take 1 tablet (600 mg total) by mouth every 6 (six) hours as needed for Pain.    lactulose  (CHRONULAC) 10 gram/15 mL solution Take by mouth 3 (three) times daily.    lipase-protease-amylase (CREON) 3,000-9,500- 15,000 unit CpDR Take 1 capsule by mouth 2 (two) times daily.    megestrol (MEGACE) 40 MG Tab Take 1 tablet (40 mg total) by mouth once daily.    miconazole (MICOTIN) 2 % vaginal cream Place 1 applicator vaginally every evening.    montelukast (SINGULAIR) 10 mg tablet Take 10 mg by mouth every evening.    nicotine (NICODERM CQ) 21 mg/24 hr Place 1 patch onto the skin once daily.    omeprazole (PRILOSEC) 20 MG capsule Take 20 mg by mouth once daily.    ondansetron (ZOFRAN) 8 MG tablet Take 8 mg by mouth every 8 (eight) hours.    sulfamethoxazole-trimethoprim 800-160mg (BACTRIM DS) 800-160 mg Tab Take 1 tablet by mouth every Mon, Wed, Fri.     Family History     Problem Relation (Age of Onset)    COPD Mother    Cancer Maternal Grandmother    Emphysema Mother    Hypertension Mother        Tobacco Use    Smoking status: Current Every Day Smoker     Packs/day: 0.50     Years: 20.00     Pack years: 10.00    Smokeless tobacco: Never Used   Substance and Sexual Activity    Alcohol use: Yes     Alcohol/week: 0.0 oz     Comment: 2/5/19: per step father a 1/2 pint a day, drink around mid January 2019     Drug use: Yes     Frequency: 1.0 times per week     Types: Cocaine, Benzodiazepines, Marijuana, Methamphetamines, Oxycodone, Hydrocodone     Comment: 2/5/19: last used cocaine a few days ago; frequent methadone and pain pill user (purchases from the street)    Sexual activity: Not on file     Review of Systems   Constitution: Negative for decreased appetite.   HENT: Negative for ear discharge.    Eyes: Negative for blurred vision.   Respiratory: Negative for hemoptysis.    Endocrine: Negative for polyphagia.   Hematologic/Lymphatic: Negative for adenopathy.   Skin: Negative for color change.   Musculoskeletal: Negative for joint swelling.   Neurological: Negative for brief paralysis.    Psychiatric/Behavioral: Negative for hallucinations.     Objective:     Vital Signs (Most Recent):  Temp: 97.6 °F (36.4 °C) (02/06/19 0743)  Pulse: 91 (02/06/19 0743)  Resp: 18 (02/06/19 0743)  BP: 108/72 (02/06/19 0743)  SpO2: 98 % (02/06/19 0743) Vital Signs (24h Range):  Temp:  [97.6 °F (36.4 °C)-98.9 °F (37.2 °C)] 97.6 °F (36.4 °C)  Pulse:  [] 91  Resp:  [16-22] 18  SpO2:  [95 %-100 %] 98 %  BP: ()/(58-82) 108/72     Weight: 49.9 kg (110 lb 0.2 oz)  Body mass index is 21.48 kg/m².    SpO2: 98 %  O2 Device (Oxygen Therapy): room air      Intake/Output Summary (Last 24 hours) at 2/6/2019 1011  Last data filed at 2/6/2019 0700  Gross per 24 hour   Intake 2578.33 ml   Output --   Net 2578.33 ml       Lines/Drains/Airways     Peripheral Intravenous Line                 Peripheral IV - Single Lumen 02/05/19 1410 Hand less than 1 day                Physical Exam   Constitutional: She is oriented to person, place, and time. She appears well-developed and well-nourished.   HENT:   Head: Normocephalic and atraumatic.   Eyes: Conjunctivae are normal. Pupils are equal, round, and reactive to light.   Neck: Normal range of motion. Neck supple.   Cardiovascular: Normal rate, normal heart sounds and intact distal pulses.   Pulmonary/Chest: Effort normal and breath sounds normal.   Abdominal: Soft. Bowel sounds are normal.   Musculoskeletal: Normal range of motion.   Neurological: She is alert and oriented to person, place, and time.   Skin: Skin is warm and dry.       Significant Labs: All pertinent lab results from the last 24 hours have been reviewed.    Significant Imaging: Echocardiogram:   2D echo with color flow doppler:   Results for orders placed or performed during the hospital encounter of 11/22/16   2D echo with color flow doppler   Result Value Ref Range    QEF 55 55 - 65    Mitral Valve Regurgitation TRIVIAL TO MILD     Diastolic Dysfunction No     Est. PA Systolic Pressure 16.4     Pericardial  Effusion NONE     Tricuspid Valve Regurgitation MILD

## 2019-02-06 NOTE — ASSESSMENT & PLAN NOTE
Positive on admit  Admits to crack cocaine use. Denies injecting and snorting  Counseled on cessation  Avoid opiates and benzos

## 2019-02-06 NOTE — ASSESSMENT & PLAN NOTE
Likely the cause of her suicidal ideation on admit  Psych consulted  Patient PEC'd  Sertraline 25mg daily started per Psych recs  Per Psych, she will need inpatient Psych hospitalization after medically cleared

## 2019-02-06 NOTE — ASSESSMENT & PLAN NOTE
- not currently compliant with therapy  - had infectious work up on last admission; being followed by ID provider at outside facility  - EKG with diffuse ST elevation; troponin elevated; evaluated by cards  - pt afebrile, no leukocytosis; had cough and cold symptoms. Could be a viral pericarditis?    Plan:  1. Would not start ART therapy at this time; according to note from Dr. Reagan, pt has seen outside HIV provider, Dr. Bui and father is wishing to pursue hospice/comfort care; would consider nursing home placement for directly observed therapy  2. Continue colchicine and supportive care for pericarditis; no pericardial effusion seen  3. Continue bactrim DS three times weekly for OI prophylaxis  4. Will sign off; needs f/u with HIV Provider upon discharge

## 2019-02-06 NOTE — ASSESSMENT & PLAN NOTE
Ongoing and chronic cocaine use.  Needs rehab and counseling but is not amenable at this time.  Should not be provided controlled substances given her significant drug problem.    02/06/2019  Patient should not be discharged with any type of IV access.

## 2019-02-06 NOTE — HOSPITAL COURSE
Admitted for acute pericarditis and suicidal ideation. Also had MARILEE on admit that resolved with IVF. Started on colchicine for suspected acute pericarditis that may be viral in origin. Cardiology consulted. TTE did not show pericardial effusion. She has no complaints of chest pain. Psychiatry consulted for suicidal ideation which is most likely related to polysubstance abuse. Started sertraline. Patient is now not suicidal. ID consulted. She has HIV/AIDS and is still a poor candidate for ART because she is not adherent to treatment. Continue bactrim for PJP ppx. She was counseled on her polysubstance abuse. Discharge to home.

## 2019-02-06 NOTE — ASSESSMENT & PLAN NOTE
CD4= 22/2.9% on 1/2019. Viral load positive  Not on ART at home  On last admit 1/19-1/27, she had thorough workup for AMS that was all negative. Infectious workup reviewed. Cause of AMS attributed to HIV dementia. She is not altered currently  Was discharged on bactrim for ppx- unclear if she has been taking it. Resume [facility] bactrim for PJP PPX

## 2019-02-06 NOTE — PROGRESS NOTES
SW  Sent pt referral through [facility] to [facility], [facility], [facility], and [facility].

## 2019-02-06 NOTE — PROGRESS NOTES
[facility]  Psychiatry  Progress Note    Patient Name: Jayden Marcial  MRN: [id number]   Code Status: Full Code  Admission Date: 2/5/2019  Hospital Length of Stay: 1 days  Expected Discharge Date:   Attending Physician: Irina Lima MD  Primary Care Provider: [facility]    Current Legal Status: N/A    Patient information was obtained from patient and Anu Kraft.     Subjective:     Principal Problem:Substance induced mood disorder    Chief Complaint: substance induced mood disorder with suicidal ideation    HPI: Patient Jayden Marcial was brought to the emergency room after family called EMS because of patient wandering the streets and stating that she has been hearing voices to kill herself.  Patient has a significant history of AIDS, Blind right eye, Cocaine use, HIV dementia, Malnutrition, Non compliance with medical treatment, Pancreatitis, Sleep difficulties, TB (pulmonary tuberculosis).  She is a difficult and poor historian.  Every other sentence out of her mouth is can I have some pain medicine.  She is not able to answer most questions.  She is only oriented to her name and hospital.  She is not able to provide me with the month or year.  She tells me that she lives with her father.  She states that she has children but is not able to tell me how many or their names.  She is not able to tell me where she grew up or her current address.  She is not able to tell me her doctor or which medications she is prescribed.  She is not able to tell me her detailed medical history only I'm sick.  She shakes her head no when asked if she has ever tried to hurt herself in the past.  When I ask if she has any access to weapons, she stares at me blankly.  She shakes her head yes when asked if she is depressed.  She also shakes her head yes when asked if she is anxious.  She says that she has voices telling her to kill herself.  She also shakes her head yes when asked if she  "can see things that others do not see or may bother her, but does not elaborate.  She shakes her head yes when asked if she smokes but does not tell me how much.  I asked her if she does any drugs and she says crack.  Again, she asks for pain medicines on multiple instances during our conversation.  I asked her if she takes pain medications and she shakes her head yes again.  I asked her if she has a prescription for them and she shakes her head no and states that she gets them from people.  Of note, she was a little combative and attempted elopement within the emergency room setting.  She has required redirection and sitters.  PEC was written in the emergency room for suicidal statement.    There is concern for cardiac related issues because of her complaint that her heart was hurting when she came into the emergency room.  She is being admitted to the medical service for monitoring.  A review of the [place] Board of Pharmacy website shows multiple prescriptions for oxycodone, methadone, alprazolam with the last ones being filled in late November 2018.  Our records indicate that she at 1 point was prescribed buspirone, citalopram, hydroxyzine as an outpatient and has multiple previous admissions for drug use.  No psychiatric admissions noted in our charting.          Hospital Course: 02/06/2019  Jayden Marcial presents right side lying wearing paper hospital scrubs. 1:1 sitter is at the bedside. Jayden is asleep but easily aroused. She is drowsy throughout the interview and falls back to sleep frequently, so the interview is cut short. She states that she is not suicidal, but wants "medicine." She denies pain but states that she wants something for itching. Nurse Oropeza is notified. Jayden is oriented to person, place and situation. Not assessed for orientation to time due to falling asleep and focus on wanting medication. She is no longer endorses suicidal ideation, she denies homicidal ideation and she is not " gravely disabled. She no longer meets criteria for a PEC/CEC. Dr. Lima is notified and discontinues the commitment.     Interval History: Jayden is no longer endorsing suicidal ideation.    Family History     Problem Relation (Age of Onset)    COPD Mother    Cancer Maternal Grandmother    Emphysema Mother    Hypertension Mother        Tobacco Use    Smoking status: Current Every Day Smoker     Packs/day: 0.50     Years: 20.00     Pack years: 10.00    Smokeless tobacco: Never Used   Substance and Sexual Activity    Alcohol use: Yes     Alcohol/week: 0.0 oz     Comment: 2/5/19: per step father a 1/2 pint a day, drink around mid January 2019     Drug use: Yes     Frequency: 1.0 times per week     Types: Cocaine, Benzodiazepines, Marijuana, Methamphetamines, Oxycodone, Hydrocodone     Comment: 2/5/19: last used cocaine a few days ago; frequent methadone and pain pill user (purchases from the street)    Sexual activity: Not on file     Psychotherapeutics (From admission, onward)    Start     Stop Route Frequency Ordered    02/06/19 0900  sertraline tablet 25 mg      -- Oral Daily 02/05/19 1703    02/05/19 1834  ramelteon tablet 8 mg      -- Oral Nightly PRN 02/05/19 1734    02/05/19 1802  OLANZapine injection 10 mg      07/04 1002 IM Once as needed 02/05/19 1703           Review of Systems   Constitutional: Negative for appetite change.   HENT: Negative for ear pain.    Eyes: Negative for pain.   Respiratory: Negative for chest tightness.    Cardiovascular: Negative for chest pain.   Gastrointestinal: Negative for abdominal pain.   Genitourinary: Negative for flank pain.   Musculoskeletal: Negative for back pain.   Neurological: Negative for headaches.   Psychiatric/Behavioral: Negative for suicidal ideas.     Objective:     Vital Signs (Most Recent):  Temp: 98.3 °F (36.8 °C) (02/06/19 1140)  Pulse: 100 (02/06/19 1140)  Resp: 18 (02/06/19 1140)  BP: 111/65 (02/06/19 1140)  SpO2: 98 % (02/06/19 1140) Vital Signs  (24h Range):  Temp:  [97.6 °F (36.4 °C)-99.1 °F (37.3 °C)] 98.3 °F (36.8 °C)  Pulse:  [] 100  Resp:  [16-20] 18  SpO2:  [95 %-100 %] 98 %  BP: ()/(52-82) 111/65     Height: 5' (152.4 cm)  Weight: 49.9 kg (110 lb 0.2 oz)  Body mass index is 21.48 kg/m².      Intake/Output Summary (Last 24 hours) at 2/6/2019 1332  Last data filed at 2/6/2019 0700  Gross per 24 hour   Intake 2578.33 ml   Output --   Net 2578.33 ml       Physical Exam     Significant Labs: All pertinent labs within the past 24 hours have been reviewed.    Significant Imaging: None    Assessment/Plan:     * Substance induced mood disorder    Patient with substance induced depression and suicidal ideations.  Will need to be treated.  Start sertraline 25 mg daily targeting depression.  Agree with PEC and one-to-one sitter because of suicidal ideation.  Notify corner of commitment process.  Seek acute inpatient psychiatric facility when medically cleared.  Hopefully as she clears up from her medical problems and drug use, her suicidal intent will also lessen.  Should she have any non redirectable or psychotic agitation, utilize olanzapine 10 mg p.o./IM every 8 hr p.r.n..  She does not have enough evidence for schizophrenia diagnosis.    02/06/2019  Patient no longer endorsing suicidal ideation. She also denies homicidal ideation. She is not gravely disabled and it is illegal to commit someone in the state [facility] for substance abuse issues. Therefore, she no longer meets the criteria for a PEC. Continue Sertraline 25 mg po qd. Should she have any non redirectable or psychotic agitation, utilize olanzapine 10 mg p.o./IM every 8 hr p.r.n..       Opioid use disorder, severe, dependence    Ongoing and chronic opioid use.  Needs rehab and counseling but is not amenable at this time.  Should not be provided controlled substances given her significant drug problem.    02/06/2019  Patient should not be discharged with any type of IV access.       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  Not sure of how good her baseline will get because of her comorbid significant drug problem.  Although cared for in the home setting, she apparently is still able to have access to her drugs.  She is not caring for herself, physically or mentally.  Consideration will need to be given in the future as to if her home setting is appropriate to her medical needs.     Cocaine use disorder, severe, dependence    Ongoing and chronic cocaine use.  Needs rehab and counseling but is not amenable at this time.  Should not be provided controlled substances given her significant drug problem.    02/06/2019  Patient should not be discharged with any type of IV access.           Need for Continued Hospitalization:   No need for inpatient psychiatric hospitalization. Continue medical care as per the primary team.    Anticipated Disposition: Nursing Facility     Total time:  15 with greater than 50% of this time spent in counseling and/or coordination of care.       Travis Marion NP   Psychiatry  [facility]

## 2019-02-06 NOTE — SUBJECTIVE & OBJECTIVE
Interval History: Asking for her medicine this morning. No specific complaints but when pressed does say she has chest pain in the center of her chest that does not change with position or inspiration. Also says she has nausea.     Review of Systems   Constitutional: Negative for chills and fever.   Respiratory: Negative for cough, chest tightness and shortness of breath.    Cardiovascular: Positive for chest pain. Negative for leg swelling.   Gastrointestinal: Positive for nausea. Negative for abdominal pain, constipation, diarrhea and vomiting.   Genitourinary: Negative for difficulty urinating.   Musculoskeletal: Negative for arthralgias and myalgias.   Skin: Positive for rash.   Neurological: Negative for dizziness, weakness, light-headedness, numbness and headaches.     Objective:     Vital Signs (Most Recent):  Temp: 97.6 °F (36.4 °C) (02/06/19 0743)  Pulse: 91 (02/06/19 0743)  Resp: 18 (02/06/19 0743)  BP: 108/72 (02/06/19 0743)  SpO2: 98 % (02/06/19 0743) Vital Signs (24h Range):  Temp:  [97.6 °F (36.4 °C)-98.9 °F (37.2 °C)] 97.6 °F (36.4 °C)  Pulse:  [] 91  Resp:  [16-22] 18  SpO2:  [95 %-100 %] 98 %  BP: ()/(58-82) 108/72     Weight: 49.9 kg (110 lb)  Body mass index is 21.48 kg/m².    Intake/Output Summary (Last 24 hours) at 2/6/2019 0811  Last data filed at 2/6/2019 0700  Gross per 24 hour   Intake 2578.33 ml   Output --   Net 2578.33 ml      Physical Exam   Constitutional: She is oriented to person, place, and time. No distress.   Thin ill appearing woman   HENT:   Head: Normocephalic and atraumatic.   Nose: Nose normal.   Mouth/Throat: Oropharynx is clear and moist.   No white plaques in oropharynx   Cardiovascular: Normal rate, regular rhythm and normal heart sounds. Exam reveals no gallop and no friction rub.   No murmur heard.  Pulmonary/Chest: Effort normal and breath sounds normal. No stridor. No respiratory distress. She has no wheezes. She has no rales.   Abdominal: Soft. Bowel  sounds are normal. She exhibits no distension and no mass. There is no tenderness. There is no guarding.   Musculoskeletal: She exhibits no edema.   Neurological: She is alert and oriented to person, place, and time.   Skin: Rash (hyperpigmented macular rash on arms) noted. She is not diaphoretic.   Nursing note and vitals reviewed.      Significant Labs: All pertinent labs within the past 24 hours have been reviewed.    Significant Imaging: I have reviewed and interpreted all pertinent imaging results/findings within the past 24 hours.

## 2019-02-06 NOTE — CONSULTS
"[facility]  Cardiology  Consult Note    Patient Name: Jayden Marcial  MRN: [id number]  Admission Date: 2/5/2019  Hospital Length of Stay: 1 days  Code Status: Full Code   Attending Provider: Irina Lima MD   Consulting Provider: Ronni Olson MD  Primary Care Physician: [facility]  Principal Problem:Pericarditis    Patient information was obtained from patient and ER records.     Consults  Subjective:     Chief Complaint:  CP     HPI:   Ms Jayden Marcial is a 43 y.o. woman with HIV/AIDS, HIV dementia, depression, polysubstance abuse who presents with "pain all over." Per Psych notes, she was actually taken to ED "patient wandering the streets and stating that she has been hearing voices to kill herself." She currently says that she is sometimes depressed and anxious but that she does not have any plans to kill herself or anyone else. She continues to complain of pain all over. She does not specify any chest pain currently, but previously in ED she did describe chest pain. She denies shortness of breath and fevers. She does have a cough productive of white sputum. Her adherence to mediations is questionable. She smokes crack cocaine and uses opioids. She requests IV dilaudid and IV benadryl to "knock [her] out" so that she will sleep tonight.      In ED, EKG showed diffuse ST segment elevation. Troponin not elevated. TTE pending. CRP and ESR elevated. Psych consulted and patient PEC'd.            Hospital Course:  Admitted for acute pericarditis and suicidal ideation. Also had MARILEE on admit that resolved with IVF. Started on colchicine for acute pericarditis. Cardiology consulted. TTE pending. Psychiatry consulted for suicidal ideation which is most likely related to polysubstance abuse. Started sertraline.      Interval History: Asking for her medicine this morning. No specific complaints but when pressed does say she has chest pain in the center of her chest that does " not change with position or inspiration. Also says she has nausea.        EKG NSR with ST elevation inferior and lateral leads    Troponin negative x 3  No prior cardiac Hx    Past Medical History:   Diagnosis Date    Addiction to drug     AIDS     Blind right eye     Cocaine use     HIV dementia     Malnutrition     Non compliance with medical treatment     Pancreatitis     Pericarditis 02/05/2019    Psychiatric problem     TB (pulmonary tuberculosis)     Tobacco abuse        Past Surgical History:   Procedure Laterality Date    ABSCESS DRAINAGE N/A 06/02/2015    mid uppeer lip    APPENDECTOMY      ASPIRATION ABSCESS FACE N/A 6/2/2015    Performed [facility]    BRONCHOSCOPY N/A 7/26/2016    Performed by Jamil Ryder MD at [facility] ENDO    CORNEAL TRANSPLANT Right        Review of patient's allergies indicates:  No Known Allergies    No current facility-administered medications on file prior to encounter.      Current Outpatient Medications on File Prior to Encounter   Medication Sig    acetaminophen (TYLENOL) 325 MG tablet Take 2 tablets (650 mg total) by mouth every 4 (four) hours as needed for Pain or Temperature greater than (100).    busPIRone (BUSPAR) 10 MG tablet Take 10 mg by mouth 3 (three) times daily.    citalopram (CELEXA) 20 MG tablet Take 1 tablet (20 mg total) by mouth once daily.    cyproheptadine (,PERIACTIN,) 2 mg/5 mL syrup Take by mouth every 8 (eight) hours.    ergocalciferol (VITAMIN D2) 50,000 unit Cap Take 50,000 Units by mouth every 7 days.    ferrous sulfate (FEOSOL) 325 mg (65 mg iron) Tab tablet Take 325 mg by mouth daily with breakfast.    fluconazole (DIFLUCAN) 200 MG Tab Take 2 tablets (400 mg total) by mouth once daily. for 10 days    hydrocortisone 1 % cream Apply topically 2 (two) times daily.    hydroxychloroquine (PLAQUENIL) 200 mg tablet Take 200 mg by mouth 2 (two) times daily.    hydrOXYzine HCl (ATARAX) 25 MG tablet Take  25 mg by mouth 3 (three) times daily.    ibuprofen (ADVIL,MOTRIN) 600 MG tablet Take 1 tablet (600 mg total) by mouth every 6 (six) hours as needed for Pain.    lactulose (CHRONULAC) 10 gram/15 mL solution Take by mouth 3 (three) times daily.    lipase-protease-amylase (CREON) 3,000-9,500- 15,000 unit CpDR Take 1 capsule by mouth 2 (two) times daily.    megestrol (MEGACE) 40 MG Tab Take 1 tablet (40 mg total) by mouth once daily.    miconazole (MICOTIN) 2 % vaginal cream Place 1 applicator vaginally every evening.    montelukast (SINGULAIR) 10 mg tablet Take 10 mg by mouth every evening.    nicotine (NICODERM CQ) 21 mg/24 hr Place 1 patch onto the skin once daily.    omeprazole (PRILOSEC) 20 MG capsule Take 20 mg by mouth once daily.    ondansetron (ZOFRAN) 8 MG tablet Take 8 mg by mouth every 8 (eight) hours.    sulfamethoxazole-trimethoprim 800-160mg (BACTRIM DS) 800-160 mg Tab Take 1 tablet by mouth every Mon, Wed, Fri.     Family History     Problem Relation (Age of Onset)    COPD Mother    Cancer Maternal Grandmother    Emphysema Mother    Hypertension Mother        Tobacco Use    Smoking status: Current Every Day Smoker     Packs/day: 0.50     Years: 20.00     Pack years: 10.00    Smokeless tobacco: Never Used   Substance and Sexual Activity    Alcohol use: Yes     Alcohol/week: 0.0 oz     Comment: 2/5/19: per step father a 1/2 pint a day, drink around mid January 2019     Drug use: Yes     Frequency: 1.0 times per week     Types: Cocaine, Benzodiazepines, Marijuana, Methamphetamines, Oxycodone, Hydrocodone     Comment: 2/5/19: last used cocaine a few days ago; frequent methadone and pain pill user (purchases from the street)    Sexual activity: Not on file     Review of Systems   Constitution: Negative for decreased appetite.   HENT: Negative for ear discharge.    Eyes: Negative for blurred vision.   Respiratory: Negative for hemoptysis.    Endocrine: Negative for polyphagia.    Hematologic/Lymphatic: Negative for adenopathy.   Skin: Negative for color change.   Musculoskeletal: Negative for joint swelling.   Neurological: Negative for brief paralysis.   Psychiatric/Behavioral: Negative for hallucinations.     Objective:     Vital Signs (Most Recent):  Temp: 97.6 °F (36.4 °C) (02/06/19 0743)  Pulse: 91 (02/06/19 0743)  Resp: 18 (02/06/19 0743)  BP: 108/72 (02/06/19 0743)  SpO2: 98 % (02/06/19 0743) Vital Signs (24h Range):  Temp:  [97.6 °F (36.4 °C)-98.9 °F (37.2 °C)] 97.6 °F (36.4 °C)  Pulse:  [] 91  Resp:  [16-22] 18  SpO2:  [95 %-100 %] 98 %  BP: ()/(58-82) 108/72     Weight: 49.9 kg (110 lb 0.2 oz)  Body mass index is 21.48 kg/m².    SpO2: 98 %  O2 Device (Oxygen Therapy): room air      Intake/Output Summary (Last 24 hours) at 2/6/2019 1011  Last data filed at 2/6/2019 0700  Gross per 24 hour   Intake 2578.33 ml   Output --   Net 2578.33 ml       Lines/Drains/Airways     Peripheral Intravenous Line                 Peripheral IV - Single Lumen 02/05/19 1410 Hand less than 1 day                Physical Exam   Constitutional: She is oriented to person, place, and time. She appears well-developed and well-nourished.   HENT:   Head: Normocephalic and atraumatic.   Eyes: Conjunctivae are normal. Pupils are equal, round, and reactive to light.   Neck: Normal range of motion. Neck supple.   Cardiovascular: Normal rate, normal heart sounds and intact distal pulses.   Pulmonary/Chest: Effort normal and breath sounds normal.   Abdominal: Soft. Bowel sounds are normal.   Musculoskeletal: Normal range of motion.   Neurological: She is alert and oriented to person, place, and time.   Skin: Skin is warm and dry.       Significant Labs: All pertinent lab results from the last 24 hours have been reviewed.    Significant Imaging: Echocardiogram:   2D echo with color flow doppler:   Results for orders placed or performed during the hospital encounter of 11/22/16   2D echo with color flow  doppler   Result Value Ref Range    QEF 55 55 - 65    Mitral Valve Regurgitation TRIVIAL TO MILD     Diastolic Dysfunction No     Est. PA Systolic Pressure 16.4     Pericardial Effusion NONE     Tricuspid Valve Regurgitation MILD      Assessment and Plan:     * Pericarditis    Agree with colchicine. 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 I will sign off. Please contact us if you have any additional questions.    Ronni Olson MD  Cardiology   [facility]

## 2019-02-06 NOTE — PLAN OF CARE
Problem: Adult Inpatient Plan of Care  Goal: Plan of Care Review  Outcome: Ongoing (interventions implemented as appropriate)   02/06/19 1453   Plan of Care Review   Plan of Care Reviewed With patient       Problem: Fall Injury Risk  Goal: Absence of Fall and Fall-Related Injury  Outcome: Ongoing (interventions implemented as appropriate)  Intervention: Identify and Manage Contributors to Fall Injury Risk   02/06/19 1453   Manage Acute Allergic Reaction   Medication Review/Management medications reviewed   Identify and Manage Contributors to Fall Injury Risk   Self-Care Promotion independence encouraged;BADL personal objects within reach;BADL personal routines maintained  (safety sitter@bedside)         Problem: Suicide Risk  Goal: Absence of Self-Harm  Outcome: Ongoing (interventions implemented as appropriate)  Intervention: Assess Risk to Self and Maintain Safety   02/06/19 1453   Support Psychosocial Response to Acute Illness   Safety/Security Measures bed alarm set; at bedside   Provide Emotional and Physical Safety   Behavior Management boundaries reinforced

## 2019-02-06 NOTE — HPI
"Ms Jayden Marcial is a 43 y.o. woman with HIV/AIDS, HIV dementia, depression, polysubstance abuse who presents with "pain all over." Per Psych notes, she was actually taken to ED "patient wandering the streets and stating that she has been hearing voices to kill herself." She currently says that she is sometimes depressed and anxious but that she does not have any plans to kill herself or anyone else. She continues to complain of pain all over. She does not specify any chest pain currently, but previously in ED she did describe chest pain. She denies shortness of breath and fevers. She does have a cough productive of white sputum. Her adherence to mediations is questionable. She smokes crack cocaine and uses opioids. She requests IV dilaudid and IV benadryl to "knock [her] out" so that she will sleep tonight.      In ED, EKG showed diffuse ST segment elevation. Troponin not elevated. TTE pending. CRP and ESR elevated. Psych consulted and patient PEC'd.            Hospital Course:  Admitted for acute pericarditis and suicidal ideation. Also had MARILEE on admit that resolved with IVF. Started on colchicine for acute pericarditis. Cardiology consulted. TTE pending. Psychiatry consulted for suicidal ideation which is most likely related to polysubstance abuse. Started sertraline.      Interval History: Asking for her medicine this morning. No specific complaints but when pressed does say she has chest pain in the center of her chest that does not change with position or inspiration. Also says she has nausea.        EKG NSR with ST elevation inferior and lateral leads    Troponin negative x 3  No prior cardiac Hx  "

## 2019-02-06 NOTE — HPI
"This is a 42 y/o female with HIV/AIDS, HIV dementia, depression, polysubstance abuse who presents with pain. Per Psych notes, she was actually taken to ED as "patient wandering the streets and stating that she has been hearing voices to kill herself." Pt endorsed chest pain in the ED. EKG showed diffuse ST segment elevation. Troponin negative. 2D echo is pending. Patient started on colchicine for pericarditis.  She denies shortness of breath and fevers. She does have a cough.    Per ID note from Dr. Reagan, patient saw Dr. Bui for ID follow up on 1/31 and had a 90 minute visit with her and step-father. According to her father, her dementia is worsening and she her substance abuse continues. They are considering hospice.     Patient had extensive infectious work up during her last admission in January. Quant gold was indeterminant. Coccidioides returned positive. Patient currently on bactrim prophylaxis three times weekly.     CRP- 153; ESR >140 on admit.     Pt lethargic and not answering questions on my exam.    "

## 2019-02-06 NOTE — PROGRESS NOTES
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  Pt was visibly agitated, kept asking for her pain meds- previously mentioned in her chart.  Pt stopped responding to my questions during the session, so limited insight was acquired.  Pt did state that she was only eating a little, maybe half of her meals, and agreed to try Boost Plus.  Because of her agitated state, NFPE was not peformed today (2/6/2019).    Wt hx in EPIC was reviewed, and pt has actually had wt gain of 8-9 kg x 6 months. It is noted that malnutrition is on pt's problem list. RD is unable to dx pt at this time since NFPE was unable to be performed & pt does not meet other criteria.   Nutrition Discharge Planning: adequate nutrient intake    Nutrition Risk Screen    Nutrition Risk Screen: other (see comments)(Poor appetite due to multiple medical conditions)    Nutrition/Diet History    Spiritual, Cultural Beliefs, Islam Practices, Values that Affect Care: no  Food Allergies: NKFA  Factors Affecting Nutritional Intake: impaired cognitive status/motor control, decreased appetite    Anthropometrics    Temp: 98.3  °F (36.8 °C)  Height Method: Stated  Height: 5' (152.4 cm)  Height (inches): 60 in  Weight Method: Stated  Weight: 49.9 kg (110 lb 0.2 oz)  Weight (lb): 110.01 lb  Ideal Body Weight (IBW), Female: 100 lb  % Ideal Body Weight, Female (lb): 110.01 lb  BMI (Calculated): 21.5  BMI Grade: 18.5-24.9 - normal       Lab/Procedures/Meds    Pertinent Labs Reviewed: reviewed  Pertinent Labs Comments: Na 135, Cl 113, CO2, 17, BUN 27, Glucose 69  Pertinent Medications Reviewed: reviewed  Pertinent Medications Comments: clonidine, pantoprazole injection, lipase-protease-amylase capsule, sertraline, abx         Estimated/Assessed Needs    Weight Used For Calorie Calculations: 49.9 kg (110 lb 0.2 oz)  Energy Calorie Requirements (kcal): 6093-2420 (30-35 kcal/kg)  Energy Need Method: Kcal/kg  Protein Requirements: 50-60g (1-1.2g/kg)  Weight Used For Protein Calculations: 49.9 kg (110 lb 0.2 oz)     Estimated Fluid Requirement Method: RDA Method  RDA Method (mL): 1497         Nutrition Prescription Ordered    Current Diet Order: Regular    Evaluation of Received Nutrient/Fluid Intake    IV Fluid (mL): 100(.9% NaCl @ 100mL/hr)  I/O: reviewed  Energy Calories Required: not meeting needs  Protein Required: not meeting needs  Fluid Required: meeting needs  Comments: LBM 2/5  Tolerance: tolerating  % Intake of Estimated Energy Needs: 25 - 50 %  % Meal Intake: 0 - 25 %    Nutrition Risk    Level of Risk/Frequency of Follow-up: (1x/wk)     Assessment and Plan  Nutrition Problem  Decreased energy intake    Related to (etiology):   Decreased appetite, AMS    Signs and Symptoms (as evidenced by):   < 50% meal intake, < 85% energy and protein needs    Interventions:  Commercial beverage    Nutrition Diagnosis Status:   New         Monitor and Evaluation    Food and Nutrient Intake: energy intake, food and beverage intake  Food and Nutrient Adminstration: diet order, enteral and parenteral nutrition administration  Knowledge/Beliefs/Attitudes:  food and nutrition knowledge/skill, beliefs and attitudes  Anthropometric Measurements: weight, weight change, body mass index  Biochemical Data, Medical Tests and Procedures: electrolyte and renal panel, glucose/endocrine profile  Nutrition-Focused Physical Findings: overall appearance       Nutrition Follow-Up    RD Follow-up?: Yes

## 2019-02-06 NOTE — ASSESSMENT & PLAN NOTE
Ongoing and chronic opioid use.  Needs rehab and counseling but is not amenable at this time.  Should not be provided controlled substances given her significant drug problem.    02/06/2019  Patient should not be discharged with any type of IV access.

## 2019-02-06 NOTE — SUBJECTIVE & OBJECTIVE
Past Medical History:   Diagnosis Date    Addiction to drug     AIDS     Blind right eye     Cocaine use     HIV dementia     Malnutrition     Non compliance with medical treatment     Pancreatitis     Pericarditis 02/05/2019    Psychiatric problem     TB (pulmonary tuberculosis)     Tobacco abuse        Past Surgical History:   Procedure Laterality Date    ABSCESS DRAINAGE N/A 06/02/2015    mid uppeer lip    APPENDECTOMY      ASPIRATION ABSCESS FACE N/A 6/2/2015    Performed by [facility]ana [facility]aeza MD at [facility] OR    BRONCHOSCOPY N/A 7/26/2016    Performed by Jamil Ryder MD at [facility] ENDO    CORNEAL TRANSPLANT Right        Review of patient's allergies indicates:  No Known Allergies    Medications:  Medications Prior to Admission   Medication Sig    acetaminophen (TYLENOL) 325 MG tablet Take 2 tablets (650 mg total) by mouth every 4 (four) hours as needed for Pain or Temperature greater than (100).    busPIRone (BUSPAR) 10 MG tablet Take 10 mg by mouth 3 (three) times daily.    citalopram (CELEXA) 20 MG tablet Take 1 tablet (20 mg total) by mouth once daily.    cyproheptadine (,PERIACTIN,) 2 mg/5 mL syrup Take by mouth every 8 (eight) hours.    ergocalciferol (VITAMIN D2) 50,000 unit Cap Take 50,000 Units by mouth every 7 days.    ferrous sulfate (FEOSOL) 325 mg (65 mg iron) Tab tablet Take 325 mg by mouth daily with breakfast.    fluconazole (DIFLUCAN) 200 MG Tab Take 2 tablets (400 mg total) by mouth once daily. for 10 days    hydrocortisone 1 % cream Apply topically 2 (two) times daily.    hydroxychloroquine (PLAQUENIL) 200 mg tablet Take 200 mg by mouth 2 (two) times daily.    hydrOXYzine HCl (ATARAX) 25 MG tablet Take 25 mg by mouth 3 (three) times daily.    ibuprofen (ADVIL,MOTRIN) 600 MG tablet Take 1 tablet (600 mg total) by mouth every 6 (six) hours as needed for Pain.    lactulose (CHRONULAC) 10 gram/15 mL solution Take by mouth 3 (three) times daily.    lipase-protease-amylase  (CREON) 3,000-9,500- 15,000 unit CpDR Take 1 capsule by mouth 2 (two) times daily.    megestrol (MEGACE) 40 MG Tab Take 1 tablet (40 mg total) by mouth once daily.    miconazole (MICOTIN) 2 % vaginal cream Place 1 applicator vaginally every evening.    montelukast (SINGULAIR) 10 mg tablet Take 10 mg by mouth every evening.    nicotine (NICODERM CQ) 21 mg/24 hr Place 1 patch onto the skin once daily.    omeprazole (PRILOSEC) 20 MG capsule Take 20 mg by mouth once daily.    ondansetron (ZOFRAN) 8 MG tablet Take 8 mg by mouth every 8 (eight) hours.    sulfamethoxazole-trimethoprim 800-160mg (BACTRIM DS) 800-160 mg Tab Take 1 tablet by mouth every Mon, Wed, Fri.     Antibiotics (From admission, onward)    Start     Stop Route Frequency Ordered    02/06/19 0915  sulfamethoxazole-trimethoprim 800-160mg per tablet 1 tablet      -- Oral Every Mon, Wed, Fri 02/06/19 0810        Antifungals (From admission, onward)    None        Antivirals (From admission, onward)    None           Immunization History   Administered Date(s) Administered    Influenza - Quadrivalent - PF 11/30/2016    Pneumococcal Polysaccharide - 23 Valent 04/29/2013       Family History     Problem Relation (Age of Onset)    COPD Mother    Cancer Maternal Grandmother    Emphysema Mother    Hypertension Mother        Social History     Socioeconomic History    Marital status: Single     Spouse name: None    Number of children: None    Years of education: None    Highest education level: None   Social Needs    Financial resource strain: None    Food insecurity - worry: None    Food insecurity - inability: None    Transportation needs - medical: None    Transportation needs - non-medical: None   Occupational History    None   Tobacco Use    Smoking status: Current Every Day Smoker     Packs/day: 0.50     Years: 20.00     Pack years: 10.00    Smokeless tobacco: Never Used   Substance and Sexual Activity    Alcohol use: Yes      Alcohol/week: 0.0 oz     Comment: 2/5/19: per step father a 1/2 pint a day, drink around mid January 2019     Drug use: Yes     Frequency: 1.0 times per week     Types: Cocaine, Benzodiazepines, Marijuana, Methamphetamines, Oxycodone, Hydrocodone     Comment: 2/5/19: last used cocaine a few days ago; frequent methadone and pain pill user (purchases from the street)    Sexual activity: None   Other Topics Concern    Patient feels they ought to cut down on drinking/drug use Not Asked    Patient annoyed by others criticizing their drinking/drug use Not Asked    Patient has felt bad or guilty about drinking/drug use Not Asked    Patient has had a drink/used drugs as an eye opener in the AM Not Asked   Social History Narrative    None     Review of Systems   Unable to perform ROS: Psychiatric disorder     Objective:     Vital Signs (Most Recent):  Temp: 98.3 °F (36.8 °C) (02/06/19 1140)  Pulse: 100 (02/06/19 1140)  Resp: 18 (02/06/19 1140)  BP: 111/65 (02/06/19 1140)  SpO2: 98 % (02/06/19 1140) Vital Signs (24h Range):  Temp:  [97.6 °F (36.4 °C)-99.1 °F (37.3 °C)] 98.3 °F (36.8 °C)  Pulse:  [] 100  Resp:  [16-20] 18  SpO2:  [95 %-100 %] 98 %  BP: ()/(52-82) 111/65     Weight: 49.9 kg (110 lb 0.2 oz)  Body mass index is 21.48 kg/m².    Estimated Creatinine Clearance: 46.4 mL/min (based on SCr of 1.1 mg/dL).    Physical Exam   Constitutional: She is oriented to person, place, and time. She appears well-developed and well-nourished. She appears cachectic. No distress.   Cardiovascular: Normal rate and regular rhythm.   No murmur heard.  Pulmonary/Chest: Effort normal and breath sounds normal. No respiratory distress.   Abdominal: Soft. Bowel sounds are normal. She exhibits no distension.   Musculoskeletal: Normal range of motion. She exhibits no edema.   Neurological: She is alert and oriented to person, place, and time.   Skin: Skin is warm and dry.   Psychiatric: She has a normal mood and affect. Her  behavior is normal.       Significant Labs:   Blood Culture:   Recent Labs   Lab 01/19/19  0935 01/19/19  0950 02/05/19  1419 02/05/19  1427   LABBLOO No growth after 5 days. No growth after 5 days. No Growth to date No Growth to date     CBC:   Recent Labs   Lab 02/05/19  1228 02/06/19  0510   WBC 4.96 2.95*   HGB 9.7* 8.1*   HCT 28.6* 24.0*    274     CMP:   Recent Labs   Lab 02/05/19  1228 02/06/19  0510    135*   K 4.1 4.5    113*   CO2 18* 17*   GLU 76 69*   BUN 45* 27*   CREATININE 1.9* 1.1   CALCIUM 9.8 8.8   PROT 9.7*  --    ALBUMIN 3.0*  --    BILITOT 0.2  --    ALKPHOS 82  --    AST 41*  --    ALT 44  --    ANIONGAP 9 5*   EGFRNONAA 31* >60     HIV 1/2 Antibodies: No results for input(s): [id number] in the last 48 hours.  HIV Rapid: No results for input(s): HIVRAPID in the last 48 hours.    Significant Imaging: I have reviewed all pertinent imaging results/findings within the past 24 hours.

## 2019-02-06 NOTE — SUBJECTIVE & OBJECTIVE
Interval History: Jayden is no longer endorsing suicidal ideation.    Family History     Problem Relation (Age of Onset)    COPD Mother    Cancer Maternal Grandmother    Emphysema Mother    Hypertension Mother        Tobacco Use    Smoking status: Current Every Day Smoker     Packs/day: 0.50     Years: 20.00     Pack years: 10.00    Smokeless tobacco: Never Used   Substance and Sexual Activity    Alcohol use: Yes     Alcohol/week: 0.0 oz     Comment: 2/5/19: per step father a 1/2 pint a day, drink around mid January 2019     Drug use: Yes     Frequency: 1.0 times per week     Types: Cocaine, Benzodiazepines, Marijuana, Methamphetamines, Oxycodone, Hydrocodone     Comment: 2/5/19: last used cocaine a few days ago; frequent methadone and pain pill user (purchases from the street)    Sexual activity: Not on file     Psychotherapeutics (From admission, onward)    Start     Stop Route Frequency Ordered    02/06/19 0900  sertraline tablet 25 mg      -- Oral Daily 02/05/19 1703    02/05/19 1834  ramelteon tablet 8 mg      -- Oral Nightly PRN 02/05/19 1734    02/05/19 1802  OLANZapine injection 10 mg      07/04 1002 IM Once as needed 02/05/19 1703           Review of Systems   Constitutional: Negative for appetite change.   HENT: Negative for ear pain.    Eyes: Negative for pain.   Respiratory: Negative for chest tightness.    Cardiovascular: Negative for chest pain.   Gastrointestinal: Negative for abdominal pain.   Genitourinary: Negative for flank pain.   Musculoskeletal: Negative for back pain.   Neurological: Negative for headaches.   Psychiatric/Behavioral: Negative for suicidal ideas.     Objective:     Vital Signs (Most Recent):  Temp: 98.3 °F (36.8 °C) (02/06/19 1140)  Pulse: 100 (02/06/19 1140)  Resp: 18 (02/06/19 1140)  BP: 111/65 (02/06/19 1140)  SpO2: 98 % (02/06/19 1140) Vital Signs (24h Range):  Temp:  [97.6 °F (36.4 °C)-99.1 °F (37.3 °C)] 98.3 °F (36.8 °C)  Pulse:  [] 100  Resp:  [16-20] 18  SpO2:   [95 %-100 %] 98 %  BP: ()/(52-82) 111/65     Height: 5' (152.4 cm)  Weight: 49.9 kg (110 lb 0.2 oz)  Body mass index is 21.48 kg/m².      Intake/Output Summary (Last 24 hours) at 2/6/2019 1332  Last data filed at 2/6/2019 0700  Gross per 24 hour   Intake 2578.33 ml   Output --   Net 2578.33 ml       Physical Exam     Significant Labs: All pertinent labs within the past 24 hours have been reviewed.    Significant Imaging: None

## 2019-02-06 NOTE — NURSING
Bedside report given to night nurse. Pt has no sign of distress. Safety precautions are maintained with bed alarm set, bed in lowest position, bed wheels locked, and call light in reach. Chart check completed. Safety sitter @bedside.

## 2019-02-07 VITALS
RESPIRATION RATE: 18 BRPM | BODY MASS INDEX: 19.56 KG/M2 | OXYGEN SATURATION: 100 % | HEIGHT: 60 IN | DIASTOLIC BLOOD PRESSURE: 53 MMHG | SYSTOLIC BLOOD PRESSURE: 87 MMHG | HEART RATE: 117 BPM | TEMPERATURE: 99 F | WEIGHT: 99.63 LBS

## 2019-02-07 PROBLEM — N28.9 ACUTE RENAL INSUFFICIENCY: Status: RESOLVED | Noted: 2019-02-05 | Resolved: 2019-02-07

## 2019-02-07 LAB
ANION GAP SERPL CALC-SCNC: 8 MMOL/L
BASOPHILS # BLD AUTO: 0.02 K/UL
BASOPHILS NFR BLD: 0.5 %
BUN SERPL-MCNC: 18 MG/DL
CALCIUM SERPL-MCNC: 9.6 MG/DL
CHLORIDE SERPL-SCNC: 109 MMOL/L
CO2 SERPL-SCNC: 16 MMOL/L
CREAT SERPL-MCNC: 1 MG/DL
DIFFERENTIAL METHOD: ABNORMAL
EOSINOPHIL # BLD AUTO: 0.2 K/UL
EOSINOPHIL NFR BLD: 3.9 %
ERYTHROCYTE [DISTWIDTH] IN BLOOD BY AUTOMATED COUNT: 16.2 %
EST. GFR  (AFRICAN AMERICAN): >60 ML/MIN/1.73 M^2
EST. GFR  (NON AFRICAN AMERICAN): >60 ML/MIN/1.73 M^2
GLUCOSE SERPL-MCNC: 49 MG/DL
HCT VFR BLD AUTO: 27.8 %
HGB BLD-MCNC: 9.4 G/DL
LYMPHOCYTES # BLD AUTO: 0.7 K/UL
LYMPHOCYTES NFR BLD: 18 %
MCH RBC QN AUTO: 29.3 PG
MCHC RBC AUTO-ENTMCNC: 33.8 G/DL
MCV RBC AUTO: 87 FL
MONOCYTES # BLD AUTO: 1.1 K/UL
MONOCYTES NFR BLD: 27.4 %
NEUTROPHILS # BLD AUTO: 1.9 K/UL
NEUTROPHILS NFR BLD: 49.7 %
PLATELET # BLD AUTO: 248 K/UL
PMV BLD AUTO: 11.1 FL
POCT GLUCOSE: 135 MG/DL (ref 70–110)
POTASSIUM SERPL-SCNC: 4.4 MMOL/L
RBC # BLD AUTO: 3.21 M/UL
SODIUM SERPL-SCNC: 133 MMOL/L
WBC # BLD AUTO: 3.83 K/UL

## 2019-02-07 PROCEDURE — S4991 NICOTINE PATCH NONLEGEND: HCPCS | Performed by: HOSPITALIST

## 2019-02-07 PROCEDURE — 63600175 PHARM REV CODE 636 W HCPCS: Performed by: EMERGENCY MEDICINE

## 2019-02-07 PROCEDURE — 25000003 PHARM REV CODE 250: Performed by: HOSPITALIST

## 2019-02-07 PROCEDURE — 90670 PCV13 VACCINE IM: CPT | Performed by: EMERGENCY MEDICINE

## 2019-02-07 PROCEDURE — 80048 BASIC METABOLIC PNL TOTAL CA: CPT

## 2019-02-07 PROCEDURE — 90472 IMMUNIZATION ADMIN EACH ADD: CPT | Performed by: EMERGENCY MEDICINE

## 2019-02-07 PROCEDURE — 85025 COMPLETE CBC W/AUTO DIFF WBC: CPT

## 2019-02-07 PROCEDURE — 90471 IMMUNIZATION ADMIN: CPT | Performed by: EMERGENCY MEDICINE

## 2019-02-07 PROCEDURE — S0166 INJ OLANZAPINE 2.5MG: HCPCS | Performed by: HOSPITALIST

## 2019-02-07 PROCEDURE — 90686 IIV4 VACC NO PRSV 0.5 ML IM: CPT | Performed by: EMERGENCY MEDICINE

## 2019-02-07 PROCEDURE — 36415 COLL VENOUS BLD VENIPUNCTURE: CPT

## 2019-02-07 RX ORDER — SERTRALINE HYDROCHLORIDE 25 MG/1
25 TABLET, FILM COATED ORAL DAILY
Qty: 30 TABLET | Refills: 11 | Status: SHIPPED | OUTPATIENT
Start: 2019-02-08 | End: 2020-02-08

## 2019-02-07 RX ORDER — COLCHICINE 0.6 MG/1
0.6 TABLET ORAL DAILY
Qty: 14 TABLET | Refills: 0 | Status: SHIPPED | OUTPATIENT
Start: 2019-02-08 | End: 2019-02-22

## 2019-02-07 RX ADMIN — INFLUENZA A VIRUS A/MICHIGAN/45/2015 X-275 (H1N1) ANTIGEN (FORMALDEHYDE INACTIVATED), INFLUENZA A VIRUS A/SINGAPORE/INFIMH-16-0019/2016 IVR-186 (H3N2) ANTIGEN (FORMALDEHYDE INACTIVATED), INFLUENZA B VIRUS B/PHUKET/3073/2013 ANTIGEN (FORMALDEHYDE INACTIVATED), AND INFLUENZA B VIRUS B/MARYLAND/15/2016 BX-69A ANTIGEN (FORMALDEHYDE INACTIVATED) 0.5 ML: 15; 15; 15; 15 INJECTION, SUSPENSION INTRAMUSCULAR at 12:02

## 2019-02-07 RX ADMIN — HYDROXYZINE HYDROCHLORIDE 50 MG: 25 TABLET, FILM COATED ORAL at 03:02

## 2019-02-07 RX ADMIN — PNEUMOCOCCAL 13-VALENT CONJUGATE VACCINE 0.5 ML: 2.2; 2.2; 2.2; 2.2; 2.2; 4.4; 2.2; 2.2; 2.2; 2.2; 2.2; 2.2; 2.2 INJECTION, SUSPENSION INTRAMUSCULAR at 12:02

## 2019-02-07 RX ADMIN — COLCHICINE 0.6 MG: 0.6 TABLET, FILM COATED ORAL at 09:02

## 2019-02-07 RX ADMIN — OLANZAPINE 10 MG: 10 INJECTION, POWDER, FOR SOLUTION INTRAMUSCULAR at 08:02

## 2019-02-07 RX ADMIN — NICOTINE 1 PATCH: 21 PATCH, EXTENDED RELEASE TRANSDERMAL at 08:02

## 2019-02-07 RX ADMIN — CYCLOBENZAPRINE HYDROCHLORIDE 5 MG: 5 TABLET, FILM COATED ORAL at 08:02

## 2019-02-07 RX ADMIN — BENZONATATE 100 MG: 100 CAPSULE ORAL at 08:02

## 2019-02-07 RX ADMIN — PANCRELIPASE LIPASE, PANCRELIPASE PROTEASE, PANCRELIPASE AMYLASE 1 CAPSULE: 5000; 17000; 24000 CAPSULE, DELAYED RELEASE ORAL at 08:02

## 2019-02-07 RX ADMIN — SERTRALINE HYDROCHLORIDE 25 MG: 25 TABLET ORAL at 08:02

## 2019-02-07 RX ADMIN — PANCRELIPASE LIPASE, PANCRELIPASE PROTEASE, PANCRELIPASE AMYLASE 1 CAPSULE: 5000; 17000; 24000 CAPSULE, DELAYED RELEASE ORAL at 12:02

## 2019-02-07 NOTE — NURSING
IV d/c tele d/c. Paperwork given to father. Vaccines given, waited 15 minutes per protocol. Pressure dsg applied to site of iv. No c/o pain at this time.

## 2019-02-07 NOTE — PLAN OF CARE
02/07/19 1152   Final Note   Assessment Type Discharge Planning Assessment   Anticipated Discharge Disposition Home   Hospital Follow Up  Appt(s) scheduled? No   Discharge plans and expectations educations in teach back method with documentation complete? No         SW Informed Nurse Miguelangel pt was cleared from case management.

## 2019-02-07 NOTE — PLAN OF CARE
"   02/07/19 1113   Discharge Assessment   Assessment Type Discharge Planning Assessment   Assessment information obtained from? Caregiver   Prior to hospitilization cognitive status: Unable to Assess   Prior to hospitalization functional status: Independent   Current cognitive status: Unable to Assess   Current Functional Status: Independent   Facility Arrived From: home   Lives With parent(s)   Able to Return to Prior Arrangements yes   Patient currently being followed by outpatient case management? No   Patient currently receives any other outside agency services? No   Equipment Currently Used at Home none   Do you have any problems affording any of your prescribed medications? No   Is the patient taking medications as prescribed? yes   Does the patient have transportation home? Yes   Transportation Anticipated family or friend will provide   Dialysis Name and Scheduled days N/A   Does the patient receive services at the [facility]mad[facility] Clinic? No   Discharge Plan A Home with family   Discharge Plan B Home with family   DME Needed Upon Discharge  none   Patient/Family in Agreement with Plan yes       SW met with pt and pt's family at bedside. SW explained her role in Care Management. Pt voiced understanding. SW inquired about HELP AT HOME. Pt stated that he/she will have Kwadwo at home to help for support. SW voiced understanding. SW inquired about responsibilities when it comes to  MANAGING HER/HIS HEALTH at home and what it entails. Pt inquired about details. SW informed pt of RESPONSIBILITIES of:    1. Follow up appointments  2. Getting Prescriptions filled  3. Taking medications as prescribed.     Pt voiced understanding.  SW explained "My Health Packet" blue folder and the pink and green tabs that are on the folder as well. Discharge Brochure given to pt with Care Team information. Pt voiced understanding.     Pt's pharmacy:   [id number]  [address] AT [place] [address] " [place] [id number]  Phone: [phone] Fax: [phone]      Pt's preference for appointments:

## 2019-02-07 NOTE — NURSING
Patient's cough was not relieved by benzonatate on this shift. Coughing seems to only occur at night. Repeated requests for medication, with nothing specific mentioned. No complaints when told there are no meds that can be given. Patient was extremely lethargic and refused to even roll for PCTs when trying to clean her up. Urinated in diaper instead of getting up to use bathroom.

## 2019-02-07 NOTE — PROGRESS NOTES
[facility]    WRITTEN HEALTHCARE AND DISCHARGE INFORMATION                        Help at Home           [phone]  After discharge for assistance [facility] On Call Nurse Care Line 24/7  Assistance    Things You are responsible For To Manage Your Care At Home:  1.    Getting your prescriptions filled   2.    Taking your medications as directed, DO NOT MISS ANY DOSES!  3.    Going to your follow-up doctor appointment. This is important because it  allow the doctor to monitor your progress and determine if  any changes need to made to your treatment plan.     Thank you for choosing [facility] for your care.  Please answer any calls you may receive from [facility] we want to continue to support you as you manage your healthcare needs. [facility] is happy to have the opportunity to serve you.     Sincerely,  Your [facility] Team,  Gerda PATEL [phone]       Follow-up Information     [facility].    Specialties:  Internal Medicine, Family Medicine  Why:  Pt has to call [phone] to schedule her appointment  Contact information:  [address]  [facility] [address]  [phone]

## 2019-02-07 NOTE — DISCHARGE SUMMARY
"[facility] Hospital Medicine  Discharge Summary      Patient Name: Jayden Marcial  MRN: [id number]  Admission Date: 2/5/2019  Hospital Length of Stay: 2 days  Discharge Date and Time:  02/07/2019 9:57 AM  Attending Physician: Irina Lima MD   Discharging Provider: Irina Lima MD  Primary Care Provider: [facility]      HPI:   Ms Jayden Marcial is a 43 y.o. woman with HIV/AIDS, HIV dementia, depression, polysubstance abuse who presents with "pain all over." Per Psych notes, she was actually taken to ED "patient wandering the streets and stating that she has been hearing voices to kill herself." She currently says that she is sometimes depressed and anxious but that she does not have any plans to kill herself or anyone else. She continues to complain of pain all over. She does not specify any chest pain currently, but previously in ED she did describe chest pain. She denies shortness of breath and fevers. She does have a cough productive of white sputum. Her adherence to mediations is questionable. She smokes crack cocaine and uses opioids. She requests IV dilaudid and IV benadryl to "knock [her] out" so that she will sleep tonight.     In ED, EKG showed diffuse ST segment elevation. Troponin not elevated. TTE pending. CRP and ESR elevated. Psych consulted and patient PEC'd.         * No surgery found *      Hospital Course:   Admitted for acute pericarditis and suicidal ideation. Also had MARILEE on admit that resolved with IVF. Started on colchicine for suspected acute pericarditis that may be viral in origin. Cardiology consulted. TTE did not show pericardial effusion. She has no complaints of chest pain. Psychiatry consulted for suicidal ideation which is most likely related to polysubstance abuse. Started sertraline. Patient is now not suicidal. ID consulted. She has HIV/AIDS and is still a poor candidate for ART because she is not adherent to treatment. Continue bactrim " for PJP ppx. She was counseled on her polysubstance abuse. Discharge to home.      Consults:   Consults (From admission, onward)        Status Ordering Provider     Inpatient consult to Cardiology  Once     Provider:  Ronni Olson MD    Acknowledged DARRICK CROCKETT     Inpatient consult to Infectious Diseases  Once     Provider:  Enrico Max MD    Completed DARRICK CROCKETT     Inpatient consult to Psychiatry  Once     Provider:  En Thompson MD    Completed DARRICK CROCKETT     Inpatient consult to Registered Dietitian/Nutritionist  Once     Provider:  (Not yet assigned)    Completed DARRICK CROCKETT          No new Assessment & Plan notes have been filed under this hospital service since the last note was generated.  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 [B20, F02.81] 06/03/2018 Yes    Cocaine use disorder, severe, dependence [F14.20] 05/31/2018 Yes    Chronic leukopenia [D72.819] 05/31/2018 Yes    Moderate protein-calorie malnutrition [E44.0] 11/22/2016 Yes     Chronic    AIDS [B20] 11/22/2016 Yes     Chronic    Tobacco abuse [Z72.0] 10/22/2013 Yes     Chronic    Anemia of chronic disorder [D63.8] 04/25/2013 Yes     Chronic      Problems Resolved During this Admission:    Diagnosis Date Noted Date Resolved POA    Acute renal insufficiency [N28.9] 02/05/2019 02/07/2019 Yes       Discharged Condition: stable    Disposition: Home or Self Care    Follow Up: with PCP    Patient Instructions:      Diet Adult Regular     Notify your health care provider if you experience any of the following:  temperature >100.4     Notify your health care provider if you experience any of the  following:  persistent nausea and vomiting or diarrhea     Notify your health care provider if you experience any of the following:  severe uncontrolled pain     Notify your health care provider if you experience any of the following:  redness, tenderness, or signs of infection (pain, swelling, redness, odor or green/yellow discharge around incision site)     Notify your health care provider if you experience any of the following:  difficulty breathing or increased cough     Notify your health care provider if you experience any of the following:  severe persistent headache     Notify your health care provider if you experience any of the following:  worsening rash     Notify your health care provider if you experience any of the following:  persistent dizziness, light-headedness, or visual disturbances     Notify your health care provider if you experience any of the following:  increased confusion or weakness     Activity as tolerated       Significant Diagnostic Studies: Labs: All labs within the past 24 hours have been reviewed    Pending Diagnostic Studies:     None         Medications:  Reconciled Home Medications:      Medication List      START taking these medications    colchicine 0.6 mg tablet  Commonly known as:  COLCRYS  Take 1 tablet (0.6 mg total) by mouth once daily. for 14 days  Start taking on:  2/8/2019     sertraline 25 MG tablet  Commonly known as:  ZOLOFT  Take 1 tablet (25 mg total) by mouth once daily.  Start taking on:  2/8/2019        CONTINUE taking these medications    acetaminophen 325 MG tablet  Commonly known as:  TYLENOL  Take 2 tablets (650 mg total) by mouth every 4 (four) hours as needed for Pain or Temperature greater than (100).     cyproheptadine 2 mg/5 mL syrup  Commonly known as:  (PERIACTIN)  Take by mouth every 8 (eight) hours.     ferrous sulfate 325 mg (65 mg iron) Tab tablet  Commonly known as:  FEOSOL  Take 325 mg by mouth daily with breakfast.     hydroxychloroquine 200  mg tablet  Commonly known as:  PLAQUENIL  Take 200 mg by mouth 2 (two) times daily.     lipase-protease-amylase 3,000-9,500- 15,000 unit Cpdr  Commonly known as:  CREON  Take 1 capsule by mouth 2 (two) times daily.     montelukast 10 mg tablet  Commonly known as:  SINGULAIR  Take 10 mg by mouth every evening.     omeprazole 20 MG capsule  Commonly known as:  PRILOSEC  Take 20 mg by mouth once daily.     sulfamethoxazole-trimethoprim 800-160mg 800-160 mg Tab  Commonly known as:  BACTRIM DS  Take 1 tablet by mouth every Mon, Wed, Fri.     VITAMIN D2 50,000 unit Cap  Generic drug:  ergocalciferol  Take 50,000 Units by mouth every 7 days.        STOP taking these medications    busPIRone 10 MG tablet  Commonly known as:  BUSPAR     citalopram 20 MG tablet  Commonly known as:  CELEXA     fluconazole 200 MG Tab  Commonly known as:  DIFLUCAN     hydrocortisone 1 % cream     hydrOXYzine HCl 25 MG tablet  Commonly known as:  ATARAX     ibuprofen 600 MG tablet  Commonly known as:  ADVIL,MOTRIN     lactulose 10 gram/15 mL solution  Commonly known as:  CHRONULAC     megestrol 40 MG Tab  Commonly known as:  MEGACE     miconazole 2 % vaginal cream  Commonly known as:  MICOTIN     nicotine 21 mg/24 hr  Commonly known as:  NICODERM CQ     ondansetron 8 MG tablet  Commonly known as:  ZOFRAN            Indwelling Lines/Drains at time of discharge:   Lines/Drains/Airways          None          Time spent on the discharge of patient: 35 minutes  Patient was seen and examined on the date of discharge and determined to be suitable for discharge.         Irina Lima MD  Department of Hospital Medicine  [facility]

## 2019-02-08 ENCOUNTER — PATIENT OUTREACH (OUTPATIENT)
Dept: ADMINISTRATIVE | Facility: CLINIC | Age: 44
End: 2019-02-08

## 2019-02-08 DIAGNOSIS — F11.221: Primary | ICD-10-CM

## 2019-02-10 LAB
BACTERIA BLD CULT: NORMAL
BACTERIA BLD CULT: NORMAL

## 2019-02-25 LAB — FUNGUS SPEC CULT: NORMAL

## 2019-03-11 ENCOUNTER — OUTPATIENT CASE MANAGEMENT (OUTPATIENT)
Dept: ADMINISTRATIVE | Facility: OTHER | Age: 44
End: 2019-03-11

## 2019-03-11 NOTE — PROGRESS NOTES
Please note the following patient's information was forwarded to the Medicaid Case Management office on 2/12/19.    Please contact Outpatient Complex Care Management at ext [phone] with any questions.    Thank you,    Dina Richmond, [facility]  Outpatient Care Mgmt.  [phone]

## 2023-03-21 ENCOUNTER — APPOINTMENT (RX ONLY)
Dept: URBAN - METROPOLITAN AREA CLINIC 10 | Facility: CLINIC | Age: 48
Setting detail: DERMATOLOGY
End: 2023-03-21

## 2023-03-21 DIAGNOSIS — Z71.89 OTHER SPECIFIED COUNSELING: ICD-10-CM

## 2023-03-21 DIAGNOSIS — D22 MELANOCYTIC NEVI: ICD-10-CM

## 2023-03-21 DIAGNOSIS — D18.0 HEMANGIOMA: ICD-10-CM

## 2023-03-21 DIAGNOSIS — L82.1 OTHER SEBORRHEIC KERATOSIS: ICD-10-CM

## 2023-03-21 DIAGNOSIS — L73.8 OTHER SPECIFIED FOLLICULAR DISORDERS: ICD-10-CM
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
## 2023-03-21 PROBLEM — D22.5 MELANOCYTIC NEVI OF TRUNK: Status: ACTIVE | Noted: 2023-03-21

## 2023-03-21 PROBLEM — D23.5 OTHER BENIGN NEOPLASM OF SKIN OF TRUNK: Status: ACTIVE | Noted: 2023-03-21

## 2023-03-21 PROBLEM — D18.01 HEMANGIOMA OF SKIN AND SUBCUTANEOUS TISSUE: Status: ACTIVE | Noted: 2023-03-21
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
## 2023-03-21 PROCEDURE — ? LIQUID NITROGEN (COSMETIC)

## 2023-03-21 PROCEDURE — ? ELECTRODESICCATION (COSMETIC)

## 2023-03-21 PROCEDURE — ? COUNSELING

## 2023-03-21 PROCEDURE — 99203 OFFICE O/P NEW LOW 30 MIN: CPT

## 2023-03-21 ASSESSMENT — LOCATION SIMPLE DESCRIPTION DERM
LOCATION SIMPLE: RIGHT FOREHEAD
LOCATION SIMPLE: INFERIOR FOREHEAD
LOCATION SIMPLE: RIGHT UPPER BACK
LOCATION SIMPLE: LEFT CHEEK
LOCATION SIMPLE: LEFT THIGH

## 2023-03-21 ASSESSMENT — LOCATION DETAILED DESCRIPTION DERM
LOCATION DETAILED: RIGHT INFERIOR MEDIAL FOREHEAD
LOCATION DETAILED: LEFT ANTERIOR DISTAL THIGH
LOCATION DETAILED: LEFT SUPERIOR MEDIAL MALAR CHEEK
LOCATION DETAILED: LEFT LATERAL MALAR CHEEK
LOCATION DETAILED: RIGHT MEDIAL UPPER BACK
LOCATION DETAILED: INFERIOR MID FOREHEAD
LOCATION DETAILED: LEFT INFERIOR LATERAL MALAR CHEEK

## 2023-03-21 ASSESSMENT — LOCATION ZONE DERM
LOCATION ZONE: LEG
LOCATION ZONE: FACE
LOCATION ZONE: TRUNK

## 2023-03-21 NOTE — PROCEDURE: LIQUID NITROGEN (COSMETIC)
Detail Level: Detailed
Price (Use Numbers Only, No Special Characters Or $): 0
Render Post-Care Instructions In Note?: no
Post-Care Instructions: I reviewed with the patient in detail post-care instructions. Patient is to wear sunprotection, and avoid picking at any of the treated lesions. Pt may apply Vaseline to crusted or scabbing areas.
Show Spray Paint Technique Variable?: Yes
Spray Paint Text: The liquid nitrogen was applied to the skin utilizing a spray paint frosting technique.
Billing Information: Bill by [employer]
Consent: The patient's consent was obtained including but not limited to risks of crusting, scabbing, blistering, scarring, darker or lighter pigmentary change, recurrence, incomplete removal and infection. The patient understands that the procedure is cosmetic in nature and is not covered by insurance.

## 2023-12-05 NOTE — ASSESSMENT & PLAN NOTE
- Na 131 on admit   - Patient has chronic hyponatremia dating back many years  - may be due to Pulm infection  - monitor     
- as diagnosed in 8/2017  - patient was prescribed treatment but did not take it  - RLL infection looks improved on CXR  - CT chest confirms RUL, RML, and RLL infiltrate  - will not start medications now    
- at baseline  - most likely due to AIDS    
- continue cyproheptadine     
- most likely due to AIDS    
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     
- non-compliant with ART  - CD4 97  - previously on Triumeq ?  - needs to establish f/u prior to restarting ART  - long d/w patient regarding adherence and that she will likely die from AIDS if she does not become more adherent  - OK to d/c om Bactrim for PJP prophylaxis  - need f/u with an HIV provider  
- nonadherent with meds and/or follow-up  - CT stable  - would not treat unless she worsens  - needs to f/u with Pulmonary and ID after discharge  
- patient nonadherent to treatment  - does not know what meds she is taking  - no meds listed at [facility], was not started on ART at last discharge  - check CD4/HIV viral load -- CD4 97/13.7%, down from 196 in 7/2016. Viral load pending.   - needs PJP PPX- start bactrim DS QD    
- replete and monitor     
- smoking 1ppd  - counseled on cessation  - nicotine patch 21mg daily     
- this is likely the cause of her delusions prior to admission. These have now cleared  - counseled on cessation     
Patient with bibasilar lower lobe opacities on CXR. I suspect given her overall status(on room air with good O2 sat) and history that these findings are likely chronic. Her CXR is actually improved from her presentation 2 years ago(2016). There are less airspace opacities. Known history of KANDY and M. [place]. Unsure if she received treatment.   -Not unreasonable to treat for CAP.   -Follow up cultures  -Not unreasonable to get CT chest without contrast. Not sure what to do with findings given chronicity of symptoms.     Note: Reviewed care everywhere. There is concern that patient had pancreatic mass/neoplasm.   
Patient with bibasilar lower lobe opacities on CXR. I suspect given her overall status(on room air with good O2 sat) and history that these findings are likely chronic. Her CXR is actually improved from her presentation 2 years ago(2016). There are less airspace opacities. Known history of KANDY and M. [place]. Unsure if she received treatment. CT chest is actually improved from 2 years ago. Cystic disease. LIP is in the differential given HIV/AIDS.   -Not unreasonable to treat for CAP.   -Follow up cultures      Note: Reviewed care everywhere. There is concern that patient had pancreatic mass/neoplasm.   
no concerns

## 2025-06-24 NOTE — HOSPITAL COURSE
Jayden Marcial presents right side lying in bed with the head of bed up. She is wearing hospital attire. Her meal tray is at the bedside and she has not eaten any of her lunch. Nurse Cordero reports that she personally tried to feed her but she would not eat. Jayden does not speak and does not answer any questions verbally. Nurse Cordero reports that she is having problems with sleep as evidenced by her family reporting to staff that she had not slept for 2 days prior to coming into the hospital. Jayden minimally moves her head up and down when asked if she would like something for sleep. Nurse Cordero reports that Jayden had to be put in restraints and was sent to ICU yesterday due to her combative behavior. She was given Haldol to calm her down. Haldol was found to be ineffective. She was given Zyprexa with positive results. She was sent back to the floor and has not had any further incidence of combative behavior. Nurse Cordero and Mateo report that Jayden can be child-like at times and at one point, Jayden pointed to her antecubital area and stated that she needed her medication and that this is where her medication goes.    Her overall symptom complex includes: diagnosis of AIDS related dementia, mostly non-verbal, flat affect, combative behavior improved with Zyprexa, positive Methamphetamine, positive cocaine and positive opioid drug screenings, poor sleep, absolute CD4=22, diagnosis of HIV/AIDS.   Medicare Preventive Visit Patient Instructions  Thank you for completing your Welcome to Medicare Visit or Medicare Annual Wellness Visit today. Your next wellness visit will be due in one year (6/25/2026).  The screening/preventive services that you may require over the next 5-10 years are detailed below. Some tests may not apply to you based off risk factors and/or age. Screening tests ordered at today's visit but not completed yet may show as past due. Also, please note that scanned in results may not display below.  Preventive Screenings:  Service Recommendations Previous Testing/Comments   Colorectal Cancer Screening  * Colonoscopy    * Fecal Occult Blood Test (FOBT)/Fecal Immunochemical Test (FIT)  * Fecal DNA/Cologuard Test  * Flexible Sigmoidoscopy Age: 45-75 years old   Colonoscopy: every 10 years (may be performed more frequently if at higher risk)  OR  FOBT/FIT: every 1 year  OR  Cologuard: every 3 years  OR  Sigmoidoscopy: every 5 years  Screening may be recommended earlier than age 45 if at higher risk for colorectal cancer. Also, an individualized decision between you and your healthcare provider will decide whether screening between the ages of 76-85 would be appropriate. Colonoscopy: 04/11/2024  FOBT/FIT: Not on file  Cologuard: Not on file  Sigmoidoscopy: Not on file    Screening Current     Breast Cancer Screening Age: 40+ years old  Frequency: every 1-2 years  Not required if history of left and right mastectomy Mammogram: 05/15/2024    Screening Current   Cervical Cancer Screening Between the ages of 21-29, pap smear recommended once every 3 years.   Between the ages of 30-65, can perform pap smear with HPV co-testing every 5 years.   Recommendations may differ for women with a history of total hysterectomy, cervical cancer, or abnormal pap smears in past. Pap Smear: 09/01/2020    Screening Not Indicated   Hepatitis C Screening Once for adults born between 1945 and 1965  More frequently in  patients at high risk for Hepatitis C Hep C Antibody: 09/08/2020    Screening Current   Diabetes Screening 1-2 times per year if you're at risk for diabetes or have pre-diabetes Fasting glucose: 97 mg/dL (2/23/2022)  A1C: 5.9 % (3/1/2024)  Screening Current   Cholesterol Screening Once every 5 years if you don't have a lipid disorder. May order more often based on risk factors. Lipid panel: 02/29/2024    Screening Not Indicated  History Lipid Disorder     Other Preventive Screenings Covered by Medicare:  Abdominal Aortic Aneurysm (AAA) Screening: covered once if your at risk. You're considered to be at risk if you have a family history of AAA.  Lung Cancer Screening: covers low dose CT scan once per year if you meet all of the following conditions: (1) Age 55-77; (2) No signs or symptoms of lung cancer; (3) Current smoker or have quit smoking within the last 15 years; (4) You have a tobacco smoking history of at least 20 pack years (packs per day multiplied by number of years you smoked); (5) You get a written order from a healthcare provider.  Glaucoma Screening: covered annually if you're considered high risk: (1) You have diabetes OR (2) Family history of glaucoma OR (3)  aged 50 and older OR (4)  American aged 65 and older  Osteoporosis Screening: covered every 2 years if you meet one of the following conditions: (1) You're estrogen deficient and at risk for osteoporosis based off medical history and other findings; (2) Have a vertebral abnormality; (3) On glucocorticoid therapy for more than 3 months; (4) Have primary hyperparathyroidism; (5) On osteoporosis medications and need to assess response to drug therapy.   Last bone density test (DXA Scan): 12/29/2020.  HIV Screening: covered annually if you're between the age of 15-65. Also covered annually if you are younger than 15 and older than 65 with risk factors for HIV infection. For pregnant patients, it is covered up to 3 times per  pregnancy.    Immunizations:  Immunization Recommendations   Influenza Vaccine Annual influenza vaccination during flu season is recommended for all persons aged >= 6 months who do not have contraindications   Pneumococcal Vaccine   * Pneumococcal conjugate vaccine = PCV13 (Prevnar 13), PCV15 (Vaxneuvance), PCV20 (Prevnar 20)  * Pneumococcal polysaccharide vaccine = PPSV23 (Pneumovax) Adults 19-65 yo with certain risk factors or if 65+ yo  If never received any pneumonia vaccine: recommend Prevnar 20 (PCV20)  Give PCV20 if previously received 1 dose of PCV13 or PPSV23   Hepatitis B Vaccine 3 dose series if at intermediate or high risk (ex: diabetes, end stage renal disease, liver disease)   Respiratory syncytial virus (RSV) Vaccine - COVERED BY MEDICARE PART D  * RSVPreF3 (Arexvy) CDC recommends that adults 60 years of age and older may receive a single dose of RSV vaccine using shared clinical decision-making (SCDM)   Tetanus (Td) Vaccine - COST NOT COVERED BY MEDICARE PART B Following completion of primary series, a booster dose should be given every 10 years to maintain immunity against tetanus. Td may also be given as tetanus wound prophylaxis.   Tdap Vaccine - COST NOT COVERED BY MEDICARE PART B Recommended at least once for all adults. For pregnant patients, recommended with each pregnancy.   Shingles Vaccine (Shingrix) - COST NOT COVERED BY MEDICARE PART B  2 shot series recommended in those 19 years and older who have or will have weakened immune systems or those 50 years and older     Health Maintenance Due:      Topic Date Due   • Breast Cancer Screening: Mammogram  05/15/2025   • Lung Cancer Screening  03/31/2026   • Colorectal Cancer Screening  04/10/2029   • Hepatitis C Screening  Completed     Immunizations Due:      Topic Date Due   • COVID-19 Vaccine (4 - 2024-25 season) 09/01/2024     Advance Directives   What are advance directives?  Advance directives are legal documents that state your wishes and  plans for medical care. These plans are made ahead of time in case you lose your ability to make decisions for yourself. Advance directives can apply to any medical decision, such as the treatments you want, and if you want to donate organs.   What are the types of advance directives?  There are many types of advance directives, and each state has rules about how to use them. You may choose a combination of any of the following:  Living will:  This is a written record of the treatment you want. You can also choose which treatments you do not want, which to limit, and which to stop at a certain time. This includes surgery, medicine, IV fluid, and tube feedings.   Durable power of  for healthcare (DPAHC):  This is a written record that states who you want to make healthcare choices for you when you are unable to make them for yourself. This person, called a proxy, is usually a family member or a friend. You may choose more than 1 proxy.  Do not resuscitate (DNR) order:  A DNR order is used in case your heart stops beating or you stop breathing. It is a request not to have certain forms of treatment, such as CPR. A DNR order may be included in other types of advance directives.  Medical directive:  This covers the care that you want if you are in a coma, near death, or unable to make decisions for yourself. You can list the treatments you want for each condition. Treatment may include pain medicine, surgery, blood transfusions, dialysis, IV or tube feedings, and a ventilator (breathing machine).  Values history:  This document has questions about your views, beliefs, and how you feel and think about life. This information can help others choose the care that you would choose.  Why are advance directives important?  An advance directive helps you control your care. Although spoken wishes may be used, it is better to have your wishes written down. Spoken wishes can be misunderstood, or not followed. Treatments  may be given even if you do not want them. An advance directive may make it easier for your family to make difficult choices about your care.   Weight Management   Why it is important to manage your weight:  Being overweight increases your risk of health conditions such as heart disease, high blood pressure, type 2 diabetes, and certain types of cancer. It can also increase your risk for osteoarthritis, sleep apnea, and other respiratory problems. Aim for a slow, steady weight loss. Even a small amount of weight loss can lower your risk of health problems.  How to lose weight safely:  A safe and healthy way to lose weight is to eat fewer calories and get regular exercise. You can lose up about 1 pound a week by decreasing the number of calories you eat by 500 calories each day.   Healthy meal plan for weight management:  A healthy meal plan includes a variety of foods, contains fewer calories, and helps you stay healthy. A healthy meal plan includes the following:  Eat whole-grain foods more often.  A healthy meal plan should contain fiber. Fiber is the part of grains, fruits, and vegetables that is not broken down by your body. Whole-grain foods are healthy and provide extra fiber in your diet. Some examples of whole-grain foods are whole-wheat breads and pastas, oatmeal, brown rice, and bulgur.  Eat a variety of vegetables every day.  Include dark, leafy greens such as spinach, kale, ludin greens, and mustard greens. Eat yellow and orange vegetables such as carrots, sweet potatoes, and winter squash.   Eat a variety of fruits every day.  Choose fresh or canned fruit (canned in its own juice or light syrup) instead of juice. Fruit juice has very little or no fiber.  Eat low-fat dairy foods.  Drink fat-free (skim) milk or 1% milk. Eat fat-free yogurt and low-fat cottage cheese. Try low-fat cheeses such as mozzarella and other reduced-fat cheeses.  Choose meat and other protein foods that are low in fat.  Choose  beans or other legumes such as split peas or lentils. Choose fish, skinless poultry (chicken or turkey), or lean cuts of red meat (beef or pork). Before you cook meat or poultry, cut off any visible fat.   Use less fat and oil.  Try baking foods instead of frying them. Add less fat, such as margarine, sour cream, regular salad dressing and mayonnaise to foods. Eat fewer high-fat foods. Some examples of high-fat foods include french fries, doughnuts, ice cream, and cakes.  Eat fewer sweets.  Limit foods and drinks that are high in sugar. This includes candy, cookies, regular soda, and sweetened drinks.  Exercise:  Exercise at least 30 minutes per day on most days of the week. Some examples of exercise include walking, biking, dancing, and swimming. You can also fit in more physical activity by taking the stairs instead of the elevator or parking farther away from stores. Ask your healthcare provider about the best exercise plan for you.    © Copyright [employer] 2018 Information is for End User's use only and may not be sold, redistributed or otherwise used for commercial purposes. All illustrations and images included in CareNotes® are the copyrighted property of [employer]., Inc. or [employer]